# Patient Record
Sex: MALE
[De-identification: names, ages, dates, MRNs, and addresses within clinical notes are randomized per-mention and may not be internally consistent; named-entity substitution may affect disease eponyms.]

---

## 2019-06-01 ENCOUNTER — TRANSCRIPTION ENCOUNTER (OUTPATIENT)
Age: 39
End: 2019-06-01

## 2021-01-04 PROBLEM — Z00.00 ENCOUNTER FOR PREVENTIVE HEALTH EXAMINATION: Status: ACTIVE | Noted: 2021-01-04

## 2021-01-08 ENCOUNTER — APPOINTMENT (OUTPATIENT)
Dept: DERMATOLOGY | Facility: CLINIC | Age: 41
End: 2021-01-08
Payer: COMMERCIAL

## 2021-01-08 DIAGNOSIS — Z86.39 PERSONAL HISTORY OF OTHER ENDOCRINE, NUTRITIONAL AND METABOLIC DISEASE: ICD-10-CM

## 2021-01-08 DIAGNOSIS — L98.9 DISORDER OF THE SKIN AND SUBCUTANEOUS TISSUE, UNSPECIFIED: ICD-10-CM

## 2021-01-08 DIAGNOSIS — L30.9 DERMATITIS, UNSPECIFIED: ICD-10-CM

## 2021-01-08 PROCEDURE — 99072 ADDL SUPL MATRL&STAF TM PHE: CPT

## 2021-01-08 PROCEDURE — 99204 OFFICE O/P NEW MOD 45 MIN: CPT

## 2021-01-08 RX ORDER — AMMONIUM LACTATE 12 %
12 CREAM (GRAM) TOPICAL
Qty: 1 | Refills: 11 | Status: ACTIVE | COMMUNITY
Start: 2021-01-08 | End: 1900-01-01

## 2021-01-08 RX ORDER — BETAMETHASONE DIPROPIONATE 0.5 MG/G
0.05 OINTMENT, AUGMENTED TOPICAL
Qty: 1 | Refills: 1 | Status: ACTIVE | COMMUNITY
Start: 2021-01-08 | End: 1900-01-01

## 2021-01-08 RX ORDER — INSULIN LISPRO 100 [IU]/ML
100 INJECTION, SOLUTION INTRAVENOUS; SUBCUTANEOUS
Refills: 0 | Status: ACTIVE | COMMUNITY

## 2021-01-08 RX ORDER — MUPIROCIN 20 MG/G
2 OINTMENT TOPICAL
Qty: 1 | Refills: 0 | Status: ACTIVE | COMMUNITY
Start: 2021-01-08 | End: 1900-01-01

## 2021-01-08 RX ORDER — INSULIN GLARGINE 100 [IU]/ML
100 INJECTION, SOLUTION SUBCUTANEOUS
Refills: 0 | Status: ACTIVE | COMMUNITY

## 2021-03-11 ENCOUNTER — APPOINTMENT (OUTPATIENT)
Dept: DERMATOLOGY | Facility: CLINIC | Age: 41
End: 2021-03-11
Payer: COMMERCIAL

## 2021-03-11 DIAGNOSIS — L30.9 DERMATITIS, UNSPECIFIED: ICD-10-CM

## 2021-03-11 DIAGNOSIS — L28.0 LICHEN SIMPLEX CHRONICUS: ICD-10-CM

## 2021-03-11 DIAGNOSIS — L85.3 XEROSIS CUTIS: ICD-10-CM

## 2021-03-11 DIAGNOSIS — L28.1 PRURIGO NODULARIS: ICD-10-CM

## 2021-03-11 PROCEDURE — 99072 ADDL SUPL MATRL&STAF TM PHE: CPT

## 2021-03-11 PROCEDURE — 17110 DESTRUCTION B9 LES UP TO 14: CPT

## 2021-03-11 PROCEDURE — 99214 OFFICE O/P EST MOD 30 MIN: CPT | Mod: 25

## 2023-09-19 ENCOUNTER — NON-APPOINTMENT (OUTPATIENT)
Age: 43
End: 2023-09-19

## 2023-12-05 ENCOUNTER — NON-APPOINTMENT (OUTPATIENT)
Age: 43
End: 2023-12-05

## 2024-02-27 ENCOUNTER — NON-APPOINTMENT (OUTPATIENT)
Age: 44
End: 2024-02-27

## 2024-12-16 ENCOUNTER — INPATIENT (INPATIENT)
Facility: HOSPITAL | Age: 44
LOS: 0 days | Discharge: ANOTHER HOSPITAL | DRG: 282 | End: 2024-12-17
Attending: HOSPITALIST | Admitting: INTERNAL MEDICINE
Payer: COMMERCIAL

## 2024-12-16 ENCOUNTER — RESULT REVIEW (OUTPATIENT)
Age: 44
End: 2024-12-16

## 2024-12-16 VITALS
HEART RATE: 80 BPM | TEMPERATURE: 98 F | SYSTOLIC BLOOD PRESSURE: 160 MMHG | RESPIRATION RATE: 18 BRPM | OXYGEN SATURATION: 100 % | DIASTOLIC BLOOD PRESSURE: 97 MMHG

## 2024-12-16 DIAGNOSIS — Z96.41 PRESENCE OF INSULIN PUMP (EXTERNAL) (INTERNAL): Chronic | ICD-10-CM

## 2024-12-16 DIAGNOSIS — I21.4 NON-ST ELEVATION (NSTEMI) MYOCARDIAL INFARCTION: ICD-10-CM

## 2024-12-16 DIAGNOSIS — I10 ESSENTIAL (PRIMARY) HYPERTENSION: ICD-10-CM

## 2024-12-16 DIAGNOSIS — R07.9 CHEST PAIN, UNSPECIFIED: ICD-10-CM

## 2024-12-16 DIAGNOSIS — E10.9 TYPE 1 DIABETES MELLITUS WITHOUT COMPLICATIONS: ICD-10-CM

## 2024-12-16 LAB
A1C WITH ESTIMATED AVERAGE GLUCOSE RESULT: 6.8 % — HIGH (ref 4–5.6)
ADD ON TEST-SPECIMEN IN LAB: SIGNIFICANT CHANGE UP
ALBUMIN SERPL ELPH-MCNC: 3.7 G/DL — SIGNIFICANT CHANGE UP (ref 3.3–5)
ALP SERPL-CCNC: 48 U/L — SIGNIFICANT CHANGE UP (ref 40–120)
ALT FLD-CCNC: 23 U/L — SIGNIFICANT CHANGE UP (ref 12–78)
ANION GAP SERPL CALC-SCNC: 1 MMOL/L — LOW (ref 5–17)
APTT BLD: 30.1 SEC — SIGNIFICANT CHANGE UP (ref 24.5–35.6)
APTT BLD: 30.5 SEC — SIGNIFICANT CHANGE UP (ref 24.5–35.6)
APTT BLD: 49.9 SEC — HIGH (ref 24.5–35.6)
APTT BLD: 77.1 SEC — HIGH (ref 24.5–35.6)
AST SERPL-CCNC: 20 U/L — SIGNIFICANT CHANGE UP (ref 15–37)
BASOPHILS # BLD AUTO: 0.03 K/UL — SIGNIFICANT CHANGE UP (ref 0–0.2)
BASOPHILS NFR BLD AUTO: 0.3 % — SIGNIFICANT CHANGE UP (ref 0–2)
BILIRUB SERPL-MCNC: 0.4 MG/DL — SIGNIFICANT CHANGE UP (ref 0.2–1.2)
BUN SERPL-MCNC: 21 MG/DL — SIGNIFICANT CHANGE UP (ref 7–23)
CALCIUM SERPL-MCNC: 9.2 MG/DL — SIGNIFICANT CHANGE UP (ref 8.5–10.1)
CHLORIDE SERPL-SCNC: 106 MMOL/L — SIGNIFICANT CHANGE UP (ref 96–108)
CHOLEST SERPL-MCNC: 210 MG/DL — HIGH
CO2 SERPL-SCNC: 30 MMOL/L — SIGNIFICANT CHANGE UP (ref 22–31)
CREAT SERPL-MCNC: 1.09 MG/DL — SIGNIFICANT CHANGE UP (ref 0.5–1.3)
D DIMER BLD IA.RAPID-MCNC: <150 NG/ML DDU — SIGNIFICANT CHANGE UP
EGFR: 85 ML/MIN/1.73M2 — SIGNIFICANT CHANGE UP
EOSINOPHIL # BLD AUTO: 0.06 K/UL — SIGNIFICANT CHANGE UP (ref 0–0.5)
EOSINOPHIL NFR BLD AUTO: 0.7 % — SIGNIFICANT CHANGE UP (ref 0–6)
ESTIMATED AVERAGE GLUCOSE: 148 MG/DL — HIGH (ref 68–114)
GLUCOSE BLDC GLUCOMTR-MCNC: 105 MG/DL — HIGH (ref 70–99)
GLUCOSE BLDC GLUCOMTR-MCNC: 111 MG/DL — HIGH (ref 70–99)
GLUCOSE BLDC GLUCOMTR-MCNC: 126 MG/DL — HIGH (ref 70–99)
GLUCOSE BLDC GLUCOMTR-MCNC: 128 MG/DL — HIGH (ref 70–99)
GLUCOSE SERPL-MCNC: 195 MG/DL — HIGH (ref 70–99)
HCT VFR BLD CALC: 45 % — SIGNIFICANT CHANGE UP (ref 39–50)
HCT VFR BLD CALC: 46.1 % — SIGNIFICANT CHANGE UP (ref 39–50)
HDLC SERPL-MCNC: 44 MG/DL — SIGNIFICANT CHANGE UP
HGB BLD-MCNC: 15.1 G/DL — SIGNIFICANT CHANGE UP (ref 13–17)
HGB BLD-MCNC: 15.4 G/DL — SIGNIFICANT CHANGE UP (ref 13–17)
IMM GRANULOCYTES NFR BLD AUTO: 0.3 % — SIGNIFICANT CHANGE UP (ref 0–0.9)
INR BLD: 0.9 RATIO — SIGNIFICANT CHANGE UP (ref 0.85–1.16)
INR BLD: 0.92 RATIO — SIGNIFICANT CHANGE UP (ref 0.85–1.16)
LIPID PNL WITH DIRECT LDL SERPL: 122 MG/DL — HIGH
LYMPHOCYTES # BLD AUTO: 2.05 K/UL — SIGNIFICANT CHANGE UP (ref 1–3.3)
LYMPHOCYTES # BLD AUTO: 22.9 % — SIGNIFICANT CHANGE UP (ref 13–44)
MCHC RBC-ENTMCNC: 31.7 PG — SIGNIFICANT CHANGE UP (ref 27–34)
MCHC RBC-ENTMCNC: 31.8 PG — SIGNIFICANT CHANGE UP (ref 27–34)
MCHC RBC-ENTMCNC: 33.4 G/DL — SIGNIFICANT CHANGE UP (ref 32–36)
MCHC RBC-ENTMCNC: 33.6 G/DL — SIGNIFICANT CHANGE UP (ref 32–36)
MCV RBC AUTO: 94.7 FL — SIGNIFICANT CHANGE UP (ref 80–100)
MCV RBC AUTO: 94.9 FL — SIGNIFICANT CHANGE UP (ref 80–100)
MONOCYTES # BLD AUTO: 0.61 K/UL — SIGNIFICANT CHANGE UP (ref 0–0.9)
MONOCYTES NFR BLD AUTO: 6.8 % — SIGNIFICANT CHANGE UP (ref 2–14)
NEUTROPHILS # BLD AUTO: 6.17 K/UL — SIGNIFICANT CHANGE UP (ref 1.8–7.4)
NEUTROPHILS NFR BLD AUTO: 69 % — SIGNIFICANT CHANGE UP (ref 43–77)
NON HDL CHOLESTEROL: 166 MG/DL — HIGH
PLATELET # BLD AUTO: 222 K/UL — SIGNIFICANT CHANGE UP (ref 150–400)
PLATELET # BLD AUTO: 235 K/UL — SIGNIFICANT CHANGE UP (ref 150–400)
POTASSIUM SERPL-MCNC: 3.9 MMOL/L — SIGNIFICANT CHANGE UP (ref 3.5–5.3)
POTASSIUM SERPL-SCNC: 3.9 MMOL/L — SIGNIFICANT CHANGE UP (ref 3.5–5.3)
PROT SERPL-MCNC: 6.6 GM/DL — SIGNIFICANT CHANGE UP (ref 6–8.3)
PROTHROM AB SERPL-ACNC: 10.6 SEC — SIGNIFICANT CHANGE UP (ref 9.9–13.4)
PROTHROM AB SERPL-ACNC: 10.9 SEC — SIGNIFICANT CHANGE UP (ref 9.9–13.4)
RBC # BLD: 4.75 M/UL — SIGNIFICANT CHANGE UP (ref 4.2–5.8)
RBC # BLD: 4.86 M/UL — SIGNIFICANT CHANGE UP (ref 4.2–5.8)
RBC # FLD: 12 % — SIGNIFICANT CHANGE UP (ref 10.3–14.5)
RBC # FLD: 12.1 % — SIGNIFICANT CHANGE UP (ref 10.3–14.5)
SODIUM SERPL-SCNC: 137 MMOL/L — SIGNIFICANT CHANGE UP (ref 135–145)
TRIGL SERPL-MCNC: 254 MG/DL — HIGH
TROPONIN I, HIGH SENSITIVITY RESULT: 164.74 NG/L — HIGH
TROPONIN I, HIGH SENSITIVITY RESULT: 184.77 NG/L — HIGH
TROPONIN I, HIGH SENSITIVITY RESULT: 186.06 NG/L — HIGH
TROPONIN I, HIGH SENSITIVITY RESULT: 99.14 NG/L — HIGH
WBC # BLD: 7.28 K/UL — SIGNIFICANT CHANGE UP (ref 3.8–10.5)
WBC # BLD: 8.95 K/UL — SIGNIFICANT CHANGE UP (ref 3.8–10.5)
WBC # FLD AUTO: 7.28 K/UL — SIGNIFICANT CHANGE UP (ref 3.8–10.5)
WBC # FLD AUTO: 8.95 K/UL — SIGNIFICANT CHANGE UP (ref 3.8–10.5)

## 2024-12-16 PROCEDURE — 99222 1ST HOSP IP/OBS MODERATE 55: CPT

## 2024-12-16 PROCEDURE — 99223 1ST HOSP IP/OBS HIGH 75: CPT

## 2024-12-16 PROCEDURE — 93306 TTE W/DOPPLER COMPLETE: CPT | Mod: 26

## 2024-12-16 PROCEDURE — 85027 COMPLETE CBC AUTOMATED: CPT

## 2024-12-16 PROCEDURE — C1887: CPT

## 2024-12-16 PROCEDURE — 85730 THROMBOPLASTIN TIME PARTIAL: CPT

## 2024-12-16 PROCEDURE — C1894: CPT

## 2024-12-16 PROCEDURE — 84484 ASSAY OF TROPONIN QUANT: CPT

## 2024-12-16 PROCEDURE — 99285 EMERGENCY DEPT VISIT HI MDM: CPT

## 2024-12-16 PROCEDURE — 71045 X-RAY EXAM CHEST 1 VIEW: CPT | Mod: 26

## 2024-12-16 PROCEDURE — 36415 COLL VENOUS BLD VENIPUNCTURE: CPT

## 2024-12-16 PROCEDURE — 83036 HEMOGLOBIN GLYCOSYLATED A1C: CPT

## 2024-12-16 PROCEDURE — 93010 ELECTROCARDIOGRAM REPORT: CPT

## 2024-12-16 PROCEDURE — 85610 PROTHROMBIN TIME: CPT

## 2024-12-16 PROCEDURE — 80061 LIPID PANEL: CPT

## 2024-12-16 PROCEDURE — 82962 GLUCOSE BLOOD TEST: CPT

## 2024-12-16 PROCEDURE — 93458 L HRT ARTERY/VENTRICLE ANGIO: CPT

## 2024-12-16 PROCEDURE — C1769: CPT

## 2024-12-16 PROCEDURE — 80048 BASIC METABOLIC PNL TOTAL CA: CPT

## 2024-12-16 RX ORDER — 0.9 % SODIUM CHLORIDE 0.9 %
1000 INTRAVENOUS SOLUTION INTRAVENOUS
Refills: 0 | Status: DISCONTINUED | OUTPATIENT
Start: 2024-12-16 | End: 2024-12-17

## 2024-12-16 RX ORDER — NITROGLYCERIN 0.4MG/HR
0.4 PATCH, TRANSDERMAL 24 HOURS TRANSDERMAL
Refills: 0 | Status: DISCONTINUED | OUTPATIENT
Start: 2024-12-16 | End: 2024-12-17

## 2024-12-16 RX ORDER — ACETAMINOPHEN 500MG 500 MG/1
650 TABLET, COATED ORAL EVERY 6 HOURS
Refills: 0 | Status: DISCONTINUED | OUTPATIENT
Start: 2024-12-16 | End: 2024-12-17

## 2024-12-16 RX ORDER — GLUCAGON INJECTION, SOLUTION 0.5 MG/.1ML
1 INJECTION, SOLUTION SUBCUTANEOUS ONCE
Refills: 0 | Status: DISCONTINUED | OUTPATIENT
Start: 2024-12-16 | End: 2024-12-17

## 2024-12-16 RX ORDER — HEPARIN SODIUM,PORCINE 1000/ML
VIAL (ML) INJECTION
Qty: 25000 | Refills: 0 | Status: DISCONTINUED | OUTPATIENT
Start: 2024-12-16 | End: 2024-12-17

## 2024-12-16 RX ORDER — INSULIN REG, HUM S-S BUFF 100/ML
0 VIAL (ML) INJECTION
Refills: 0 | DISCHARGE

## 2024-12-16 RX ORDER — ACETAMINOPHEN, DIPHENHYDRAMINE HCL, PHENYLEPHRINE HCL 325; 25; 5 MG/1; MG/1; MG/1
3 TABLET ORAL AT BEDTIME
Refills: 0 | Status: DISCONTINUED | OUTPATIENT
Start: 2024-12-16 | End: 2024-12-17

## 2024-12-16 RX ORDER — INFLUENZA VIRUS VACCINE 15; 15; 15; 15 UG/.5ML; UG/.5ML; UG/.5ML; UG/.5ML
0.5 SUSPENSION INTRAMUSCULAR ONCE
Refills: 0 | Status: DISCONTINUED | OUTPATIENT
Start: 2024-12-16 | End: 2024-12-17

## 2024-12-16 RX ORDER — MAGNESIUM, ALUMINUM HYDROXIDE 200-225/5
30 SUSPENSION, ORAL (FINAL DOSE FORM) ORAL EVERY 4 HOURS
Refills: 0 | Status: DISCONTINUED | OUTPATIENT
Start: 2024-12-16 | End: 2024-12-17

## 2024-12-16 RX ORDER — NITROGLYCERIN 0.4MG/HR
0.5 PATCH, TRANSDERMAL 24 HOURS TRANSDERMAL ONCE
Refills: 0 | Status: COMPLETED | OUTPATIENT
Start: 2024-12-16 | End: 2024-12-16

## 2024-12-16 RX ORDER — HEPARIN SODIUM,PORCINE 1000/ML
4100 VIAL (ML) INJECTION EVERY 6 HOURS
Refills: 0 | Status: DISCONTINUED | OUTPATIENT
Start: 2024-12-16 | End: 2024-12-17

## 2024-12-16 RX ORDER — ONDANSETRON HYDROCHLORIDE 4 MG/1
4 TABLET, FILM COATED ORAL EVERY 6 HOURS
Refills: 0 | Status: DISCONTINUED | OUTPATIENT
Start: 2024-12-16 | End: 2024-12-17

## 2024-12-16 RX ORDER — CYCLOBENZAPRINE HCL 10 MG
10 TABLET ORAL ONCE
Refills: 0 | Status: COMPLETED | OUTPATIENT
Start: 2024-12-16 | End: 2024-12-16

## 2024-12-16 RX ORDER — HEPARIN SODIUM,PORCINE 1000/ML
4100 VIAL (ML) INJECTION ONCE
Refills: 0 | Status: COMPLETED | OUTPATIENT
Start: 2024-12-16 | End: 2024-12-16

## 2024-12-16 RX ADMIN — Medication 10 MILLIGRAM(S): at 01:01

## 2024-12-16 RX ADMIN — Medication 900 UNIT(S)/HR: at 19:16

## 2024-12-16 RX ADMIN — Medication 800 UNIT(S)/HR: at 07:17

## 2024-12-16 RX ADMIN — Medication 750 UNIT(S)/HR: at 11:48

## 2024-12-16 RX ADMIN — Medication 4100 UNIT(S): at 04:55

## 2024-12-16 RX ADMIN — Medication 900 UNIT(S)/HR: at 19:14

## 2024-12-16 RX ADMIN — Medication 0.5 INCH(S): at 02:42

## 2024-12-16 RX ADMIN — Medication 81 MILLIGRAM(S): at 10:03

## 2024-12-16 RX ADMIN — Medication 800 UNIT(S)/HR: at 04:56

## 2024-12-16 NOTE — PATIENT PROFILE ADULT - FALL HARM RISK - HARM RISK INTERVENTIONS

## 2024-12-16 NOTE — ED ADULT NURSE REASSESSMENT NOTE - NS ED NURSE REASSESS COMMENT FT1
. Pt resting comfortably in bed, denies any pain/ symptoms at this time, vital signs stable, awaiting SW consult.

## 2024-12-16 NOTE — ED PROVIDER NOTE - CLINICAL SUMMARY MEDICAL DECISION MAKING FREE TEXT BOX
45 yo M with chest pain; hx of DM type 1; heart score is 3.  Will need admission to tele with cardiology consult.  will trend troponins, control BP with nitroglycerin paste and monitor.

## 2024-12-16 NOTE — ED ADULT TRIAGE NOTE - CHIEF COMPLAINT QUOTE
Pt BIBEMS c/o chest pain. Pt reports 3 episodes of L sided CP today radiating to left arm, resolving spontaneously. Pt denies cardiac hx, recent travel, SOB at this time. EKG done in triage, Hx DM1.

## 2024-12-16 NOTE — H&P ADULT - NSICDXFAMILYHX_GEN_ALL_CORE_FT
FAMILY HISTORY:  Father  Still living? Unknown  FH: type 2 diabetes, Age at diagnosis: Age Unknown  FHx: heart disease, Age at diagnosis: Age Unknown    Mother  Still living? Unknown  FHx: heart disease, Age at diagnosis: Age Unknown

## 2024-12-16 NOTE — CONSULT NOTE ADULT - NS ATTEND AMEND GEN_ALL_CORE FT
Patient seen and examined bedside.   Patient presented with small troponin elevation but typical recurrent CP at rest concerning for unstable angina/NSTEMI  I discussed the risks and benefits of LHC and he wished to proceed.     LHC showed severe LAD/diagonal and RCA disease.  Will transfer for CABG evaluation
agree w/ above. Longstanding DM type 1 typical chest pain plan for cath.

## 2024-12-16 NOTE — ED PROVIDER NOTE - CARDIAC, MLM
Normal rate, regular rhythm.  Heart sounds S1, S2.  No murmurs, rubs or gallops. CHEST WALL: +reproducible left pectoralis muscle tenderness and left anterior shoulder tenderness Frank GARZA)

## 2024-12-16 NOTE — CONSULT NOTE ADULT - SUBJECTIVE AND OBJECTIVE BOX
Department of Cardiology                                                               Division of Interventional Cardiology                                                               [facility] /[address] [address]                                                                                 [phone]           Interventional Cardiology Consult     Patient is a 44y old  Male who presents with a chief complaint of NSTEMI with elevated troponin. (16 Dec 2024 10:42)    Interventional cardiology consulted for [facility]. NSTEMI troponin down trending today, peaked at 186. 164 today.        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 Sig labs: CBC and CMP wnl except .  Troponin trending up: 99.14->184.77  D-dimer normal <150.  EK bpm in NSR and no acute ST-T changes.  CXR negative for any acute cardiopulmonary abnormalities.     (16 Dec 2024 03:17)    currently patient is CP free  pt and family aware of procedure all risks and benefits discusses. patient is aware he will have to turn off and remove insulin pump and continous glucose monitor during [facility] procedure         PREVIOUS DIAGNOSTIC TESTING  ECHO FINDINGS: report pending     STRESS FINDINGS: none  CATHETERIZATION: FINDINGS: none   EKG EK bpm in NSR and no acute ST-T changes.    Vital Signs  Vital Signs Last 24 Hrs  T(C): 36.8 (16 Dec 2024 08:35), Max: 36.8 (16 Dec 2024 08:35)  T(F): 98.2 (16 Dec 2024 08:35), Max: 98.2 (16 Dec 2024 08:35)  HR: 80 (16 Dec 2024 08:35) (77 - 81)  BP: 111/73 (16 Dec 2024 08:35) (111/73 - 160/97)  BP(mean): 102 (16 Dec 2024 03:28) (102 - 102)  RR: 18 (16 Dec 2024 08:35) (18 - 18)  SpO2: 98% (16 Dec 2024 08:35) (97% - 100%)    Parameters below as of 16 Dec 2024 04:30  Patient On (Oxygen Delivery Method): room air          Labs:                        15.4   7.28  )-----------( 222      ( 16 Dec 2024 09:33 )             46.1     12-16    137  |  106  |  21  ----------------------------<  195[H]  3.9   |  30  |  1.09    Ca    9.2      16 Dec 2024 00:29    TPro  6.6  /  Alb  3.7  /  TBili  0.4  /  DBili  x   /  AST  20  /  ALT  23  /  AlkPhos  48  12-    PT/INR - ( 16 Dec 2024 04:13 )   PT: 10.9 sec;   INR: 0.92 ratio         PTT - ( 16 Dec 2024 09:33 )  PTT:77.1 sec    - TroponinI hsT: <-164.74, <-186.06, <-184.77, <-99.14        MEDICATIONS  (STANDING):  aspirin  chewable 81 milliGRAM(s) Oral daily  atorvastatin 40 milliGRAM(s) Oral at bedtime  dextrose 5%. 1000 milliLiter(s) (100 mL/Hr) IV Continuous <Continuous>  dextrose 5%. 1000 milliLiter(s) (50 mL/Hr) IV Continuous <Continuous>  dextrose 50% Injectable 25 Gram(s) IV Push once  dextrose 50% Injectable 12.5 Gram(s) IV Push once  dextrose 50% Injectable 25 Gram(s) IV Push once  glucagon  Injectable 1 milliGRAM(s) IntraMuscular once  heparin  Infusion.  Unit(s)/Hr (8 mL/Hr) IV Continuous <Continuous>  influenza   Vaccine 0.5 milliLiter(s) IntraMuscular once  insulin lispro (HumaLOG) Pump 1 Each SubCutaneous Continuous Pump    MEDICATIONS  (PRN):  acetaminophen     Tablet .. 650 milliGRAM(s) Oral every 6 hours PRN Mild Pain (1 - 3)  aluminum hydroxide/magnesium hydroxide/simethicone Suspension 30 milliLiter(s) Oral every 4 hours PRN Dyspepsia  dextrose Oral Gel 15 Gram(s) Oral once PRN Blood Glucose LESS THAN 70 milliGRAM(s)/deciliter  heparin   Injectable 4100 Unit(s) IV Push every 6 hours PRN For aPTT less than 40  melatonin 3 milliGRAM(s) Oral at bedtime PRN Insomnia  nitroglycerin     SubLingual 0.4 milliGRAM(s) SubLingual every 5 minutes PRN Chest Pain  ondansetron Injectable 4 milliGRAM(s) IV Push every 6 hours PRN Nausea and/or Vomiting      PHYSICAL EXAM  GENERAL: NAD, AAOx3  CHEST/LUNG: Clear to auscultation bilaterally; No wheeze  HEART: s1 s2 Regular rate and rhythm; No murmurs, rubs, or gallops  ABDOMEN: Soft, Nontender, Nondistended; Bowel sounds present X 4 quadrants   EXTREMITIES:  2+ Peripheral Pulses, No clubbing, cyanosis, or edema  Psych: normal affect and behavior, calm and cooperative                   
  CHIEF COMPLAINT: Patient is a 44y old  Male who presents with a chief complaint of NSTEMI with elevated troponin. (16 Dec 2024 10:30)      HPI:  Chief Complaint: chest pain.    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 He was feeling more like chest pressure and pinching, and radiating to his left arm, lasted for about 30 seconds.  They, he felt similar pain at about 12.20 pm while he was sitting in a couch and was trying to turn on the TV. At the same time, he also felt left arm heaviness and he intentionally dropped the TV remote by himself.  He denies any weakness in his left hand at that time.  He also denies any stroke like symptoms.  I also asked him about prior information that he said that his left arm was weak and TV remote was dropped, he said that that information is not true.  He also denies headache, blurry vision or diplopia.  Then, he has another episode of chest pain at about 7.00 PM, and again lasted for about 30-45 seconds, but had some sob at this time. He did not take any medications at home. Then, her had 4th episode of left sided chest pain at about 9.00-9.30 pm, then his wife suggested to chanelle 911, and he was BIBA to ED. He has received ASA in ambulance. At this time, he is chest pain free, and feeling better, but feels anxious and nervous. He says that he works as a , and has mostly sedentary life style. Patient says that he never has chest pain like this. Otherwise, he denies SOB, palpitation, N/V, abd pain, diarrhea or dysuria.  He also denies smoking, alcohol  or substance abuse. He says that his BS usually runs about 140, and his last A1c level was 6.4 about 2 months ago,   Sig labs: CBC and CMP wnl except .  Troponin trending up: 99.14->184.77  D-dimer normal <150.  EK bpm in NSR and no acute ST-T changes.  CXR negative for any acute cardiopulmonary abnormalities.    Flexeril and SL nitro given in ED, and Cardiology consult has been placed by ED.    I requested ED provider to start heparin drip with bolus.     (16 Dec 2024 03:17)    Cardiology consulted for chest pain and elevated trops. Pt. with risk factors - T1DM, positive cardiac Fx, never seen a cardiologist.     PAST MEDICAL & SURGICAL HISTORY:  DM (diabetes mellitus)  Diabetes mellitus type 1  Insulin pump in place  Insulin pump in place    SOCIAL HISTORY:   Alcohol: Denied  Smoking: Nonsmoker  Drug Use: Denied  Marital Status:     FAMILY HISTORY: FAMILY HISTORY:  FHx: heart disease (Father, Mother)  FH: type 2 diabetes (Father)    MEDICATIONS  (STANDING):  aspirin  chewable 81 milliGRAM(s) Oral daily  atorvastatin 40 milliGRAM(s) Oral at bedtime  dextrose 5%. 1000 milliLiter(s) (100 mL/Hr) IV Continuous <Continuous>  dextrose 5%. 1000 milliLiter(s) (50 mL/Hr) IV Continuous <Continuous>  dextrose 50% Injectable 25 Gram(s) IV Push once  dextrose 50% Injectable 12.5 Gram(s) IV Push once  dextrose 50% Injectable 25 Gram(s) IV Push once  glucagon  Injectable 1 milliGRAM(s) IntraMuscular once  heparin  Infusion.  Unit(s)/Hr (8 mL/Hr) IV Continuous <Continuous>  influenza   Vaccine 0.5 milliLiter(s) IntraMuscular once  insulin lispro (ADMELOG) corrective regimen sliding scale   SubCutaneous three times a day before meals  insulin lispro (ADMELOG) corrective regimen sliding scale   SubCutaneous at bedtime    MEDICATIONS  (PRN):  acetaminophen     Tablet .. 650 milliGRAM(s) Oral every 6 hours PRN Mild Pain (1 - 3)  aluminum hydroxide/magnesium hydroxide/simethicone Suspension 30 milliLiter(s) Oral every 4 hours PRN Dyspepsia  dextrose Oral Gel 15 Gram(s) Oral once PRN Blood Glucose LESS THAN 70 milliGRAM(s)/deciliter  heparin   Injectable 4100 Unit(s) IV Push every 6 hours PRN For aPTT less than 40  melatonin 3 milliGRAM(s) Oral at bedtime PRN Insomnia  nitroglycerin     SubLingual 0.4 milliGRAM(s) SubLingual every 5 minutes PRN Chest Pain  ondansetron Injectable 4 milliGRAM(s) IV Push every 6 hours PRN Nausea and/or Vomiting      Allergies - No Known Allergies      REVIEW OF SYSTEMS:  CONSTITUTIONAL: No weakness, fevers or chills  Eyes: No visual changes  NECK: No pain or stiffness  RESPIRATORY: No cough, wheezing, hemoptysis; No shortness of breath  CARDIOVASCULAR: No chest pain or palpitations  GASTROINTESTINAL: No abdominal pain. No nausea, vomiting, or hematemesis; No diarrhea or constipation. No melena or hematochezia.  GENITOURINARY: No dysuria, frequency or hematuria  NEUROLOGICAL: No numbness.  SKIN: No itching or rash  All other review of systems is negative unless indicated above    VITAL SIGNS:   Vital Signs Last 24 Hrs  T(C): 36.8 (16 Dec 2024 08:35), Max: 36.8 (16 Dec 2024 08:35)  T(F): 98.2 (16 Dec 2024 08:35), Max: 98.2 (16 Dec 2024 08:35)  HR: 80 (16 Dec 2024 08:35) (77 - 81)  BP: 111/73 (16 Dec 2024 08:35) (111/73 - 160/97)  BP(mean): 102 (16 Dec 2024 03:28) (102 - 102)  RR: 18 (16 Dec 2024 08:35) (18 - 18)  SpO2: 98% (16 Dec 2024 08:35) (97% - 100%)    Parameters below as of 16 Dec 2024 04:30  Patient On (Oxygen Delivery Method): room air        I&O's Summary      PHYSICAL EXAM:  Constitutional: NAD, awake and alert  HEENT:  EOMI,  Pupils round, No oral cyanosis.  Pulmonary: Non-labored, breath sounds are clear bilaterally, No wheezing, rales or rhonchi  Cardiovascular: S1 and S2, regular rate and rhythm, no Murmurs, gallops or rubs  Gastrointestinal: Bowel Sounds present, soft, nontender.   Lymph: No peripheral edema. No cervical lymphadenopathy.  Neurological: Alert, no focal deficits  Skin: No rashes.  Psych:  Mood & affect appropriate    LABS: reviewed                         15.1   8.95  )-----------( 235      ( 16 Dec 2024 00:29 )             45.0     16 Dec 2024 00:29    137    |  106    |  21     ----------------------------<  195    3.9     |  30     |  1.09     Ca    9.2        16 Dec 2024 00:29    TPro  6.6    /  Alb  3.7    /  TBili  0.4    /  DBili  x      /  AST  20     /  ALT  23     /  AlkPhos  48     16 Dec 2024 00:29    PT/INR - ( 16 Dec 2024 04:13 )   PT: 10.9 sec;   INR: 0.92 ratio         PTT - ( 16 Dec 2024 04:13 )  PTT:30.5 sec    RadiologyEKG/TTE: reviewed     TTE - pending

## 2024-12-16 NOTE — ED PROVIDER NOTE - OBJECTIVE STATEMENT
Pt. is a 45 yo M with hx of type 1 DM on insulin pump (diagnosed age 20) , hx of left frozen shoulder in the past, presents with left sided upper chest pain described as heavy pressure.  Pain lasts less than a minute and he had 4 separate episodes of the pain today.  Pain radiates down left arm with some tingling.  Patient first noticed the pain after lifting heavy laundry basket.  He then tried to lift arm to use remote control on couch and noticed left arm dropped.  Patient denies any muscle weakness or numbness in arms or legs.  Denies dizziness, neck pain, abdominal pain, sweats.  Last episode was today while lying in bed at rest.  Pain began and patient had to sit up and lean forward due to slight SOB feeling. Leaning forward helped symptoms resolve after 30 seconds to 1 minute.  Wife asked patient to call 911 which he at first refused but then called.  States in ambulance he had aspirin.  Has been asymptomatic since.

## 2024-12-16 NOTE — H&P ADULT - ASSESSMENT
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 He was feeling more like chest pressure and pinching, and radiating to his left arm, lasted for about 30 seconds.  They, he felt similar pain at about 12.20 pm while he was sitting in a couch and was trying to turn on the TV. At the same time, he also felt left arm heaviness and he intentionally dropped the TV remote by himself.  He denies any weakness in his left hand at that time.  He also denies any stroke like symptoms.  I also asked him about prior information that he said that his left arm was weak and TV remote was dropped, he said that that information is not true.  He also denies headache, blurry vision or diplopia.  Then, he has another episode of chest pain at about 7.00 PM, and again lasted for about 30-45 seconds, but had some sob at this time. He did not take any medications at home. Then, her had 4th episode of left sided chest pain at about 9.00-9.30 pm, then his wife suggested to chanelle 911, and he was BIBA to ED. He has received ASA in ambulance. At this time, he is chest pain free, and feeling better, but feels anxious and nervous. He says that he works as a , and has mostly sedentary life style. Otherwise, he denies SOB, palpitation, N/V, abd pain, diarrhea or dysuria.  He also denies smoking, alcohol  or substance abuse. He says that his BS usually runs about 140, and his last A1c level was 6.4 about 2 months ago,  Sig labs: CBC and CMP wnl except  and troponin 99.14.  EK bpm in NSR and no acute ST-T changes.  CXR negative for any acute cardiopulmonary abnormalities.    Flexeril and SL nitro given in ED, and Cardiology consult has been placed by ED.    # Precordial chest pain with mildly elevated troponin.  -Troponin 99.14, EKG and CXR   unremarkable.  -Admit to telemetry.  -Serial troponin.  Lipid panel.  -2D Echo.  -SL nitro prn.  -ASA and Statin started.  -Follow cardiology consult in am.    # Uncontrolled DM-1 with hyperglycemia.  -.  -Will check A1c level.  -ISS with coverage.  -Has insulin pump in place.    # Accelerated HTN.  -Likely stress related. Now controlled.  -Initial BP in ED was 160/97, and now 132/88.  -Continue to monitor.    # DVT prophylaxis: Lovenox sq daily.           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     # Precordial chest pain with mildly elevated troponin.  -Troponin 99.14, EKG and CXR   unremarkable.  -Admit to telemetry.  -Serial troponin.  Lipid panel.  -2D Echo.  -SL nitro prn.  -Heparin drip with bolus.  -ASA and Statin started.  -Oxygen via NC  -Follow cardiology consult in am.    # Uncontrolled DM-1 with hyperglycemia.  -.  -Will check A1c level.  -ISS with coverage.  -Has insulin pump in place.    # Accelerated HTN.  -Likely stress related. Now controlled.  -Initial BP in ED was 160/97, and now 132/88.  -Continue to monitor.    # DVT prophylaxis: On heparin drip.    Plan of care d/w the patient in detail at bedside, and he verbalized understanding.           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     # NSTEMI with elevated troponin.  -Troponin trending up 99.14->184.77, EKG and CXR   unremarkable.  -Chest pain free now.  -Admit to telemetry.  -Serial troponin.  Lipid panel.  -2D Echo.  -SL nitro prn.  -Heparin drip with bolus.  -ASA and Statin started.  -Oxygen via NC  -Follow cardiology consult in am.    # Uncontrolled DM-1 with hyperglycemia.  -.  -Will check A1c level.  -ISS with coverage.  -Has insulin pump in place.    # Accelerated HTN.  -Likely stress related. Now controlled.  -Initial BP in ED was 160/97, and now 132/88.  -Continue to monitor.    # DVT prophylaxis: On heparin drip.    Plan of care d/w the patient in detail at bedside, and he verbalized understanding.           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     # NSTEMI with elevated troponin.  -Troponin trending up 99.14->184.77, D-dimer negative.   -EKG and CXR   unremarkable.  -Chest pain free now.  -Admit to telemetry.  -Serial troponin.  Lipid panel.  -2D Echo.  -SL nitro prn.  -Heparin drip with bolus.  -ASA and Statin started.  -Oxygen via NC  -Follow cardiology consult in am.    # Uncontrolled DM-1 with hyperglycemia.  -.  -Will check A1c level.  -ISS with coverage.  -Has insulin pump in place.    # Accelerated HTN.  -Likely stress related. Now controlled.  -Initial BP in ED was 160/97, and now 132/88.  -Continue to monitor.    # DVT prophylaxis: On heparin drip.    Plan of care d/w the patient in detail at bedside, and he verbalized understanding.

## 2024-12-16 NOTE — CONSULT NOTE ADULT - PROBLEM SELECTOR RECOMMENDATION 9
-plan for cardiac cath for ischemic work up tomorrow 12/17  - can have light breakfast in AM then npo x meds   -     ASA class: II  Creatinine: 1.09  GFR: 85  Bleeding  Risk score: 0.9  Sage Score: 4

## 2024-12-16 NOTE — ED ADULT NURSE NOTE - NSFALLUNIVINTERV_ED_ALL_ED
Bed/Stretcher in lowest position, wheels locked, appropriate side rails in place/Call bell, personal items and telephone in reach/Instruct patient to call for assistance before getting out of bed/chair/stretcher/Non-slip footwear applied when patient is off stretcher/[place] to call system/Physically safe environment - no spills, clutter or unnecessary equipment/Purposeful proactive rounding/Room/bathroom lighting operational, light cord in reach

## 2024-12-16 NOTE — ED PROVIDER NOTE - CARE PLAN
1 Principal Discharge DX:	Chest pain  Secondary Diagnosis:	History of diabetes mellitus  Secondary Diagnosis:	Elevated troponin

## 2024-12-16 NOTE — H&P ADULT - NSHPLABSRESULTS_GEN_ALL_CORE
LABS:                        15.1   8.95  )-----------( 235      ( 16 Dec 2024 00:29 )             45.0     12-16    137  |  106  |  21  ----------------------------<  195[H]  3.9   |  30  |  1.09    Ca    9.2      16 Dec 2024 00:29    TPro  6.6  /  Alb  3.7  /  TBili  0.4  /  DBili  x   /  AST  20  /  ALT  23  /  Alk Phos  48  12-16

## 2024-12-16 NOTE — H&P ADULT - HISTORY OF PRESENT ILLNESS
Chief Complaint: chest pain.    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 He was feeling more like chest pressure and pinching, and radiating to his left arm, lasted for about 30 seconds.  They, he felt similar pain at about 12.20 pm while he was sitting in a couch and was trying to turn on the TV. At the same time, he also felt left arm heaviness and he intentionally dropped the TV remote by himself.  He denies any weakness in his left hand at that time.  He also denies any stroke like symptoms.  I also asked him about prior information that he said that his left arm was weak and TV remote was dropped, he said that that information is not true.  He also denies headache, blurry vision or diplopia.  Then, he has another episode of chest pain at about 7.00 PM, and again lasted for about 30-45 seconds, but had some sob at this time. He did not take any medications at home. Then, her had 4th episode of left sided chest pain at about 9.00-9.30 pm, then his wife suggested to chanelle 911, and he was BIBA to ED. He has received ASA in ambulance. At this time, he is chest pain free, and feeling better, but feels anxious and nervous. He says that he works as a , and has mostly sedentary life style. Otherwise, he denies SOB, palpitation, N/V, abd pain, diarrhea or dysuria.  He also denies smoking, alcohol  or substance abuse. He says that his BS usually runs about 140, and his last A1c level was 6.4 about 2 months ago,  Sig labs: CBC and CMP wnl except  and troponin 99.14.  EK bpm in NSR and no acute ST-T changes.  CXR negative for any acute cardiopulmonary abnormalities.    Flexeril and SL nitro given in ED, and Cardiology consult has been placed by ED.       Chief Complaint: chest pain.    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 He was feeling more like chest pressure and pinching, and radiating to his left arm, lasted for about 30 seconds.  They, he felt similar pain at about 12.20 pm while he was sitting in a couch and was trying to turn on the TV. At the same time, he also felt left arm heaviness and he intentionally dropped the TV remote by himself.  He denies any weakness in his left hand at that time.  He also denies any stroke like symptoms.  I also asked him about prior information that he said that his left arm was weak and TV remote was dropped, he said that that information is not true.  He also denies headache, blurry vision or diplopia.  Then, he has another episode of chest pain at about 7.00 PM, and again lasted for about 30-45 seconds, but had some sob at this time. He did not take any medications at home. Then, her had 4th episode of left sided chest pain at about 9.00-9.30 pm, then his wife suggested to chanelle 911, and he was BIBA to ED. He has received ASA in ambulance. At this time, he is chest pain free, and feeling better, but feels anxious and nervous. He says that he works as a , and has mostly sedentary life style. Patient says that he never has chest pain like this. Otherwise, he denies SOB, palpitation, N/V, abd pain, diarrhea or dysuria.  He also denies smoking, alcohol  or substance abuse. He says that his BS usually runs about 140, and his last A1c level was 6.4 about 2 months ago,   Sig labs: CBC and CMP wnl except .  Troponin trending up: 99.14->184.77  EK bpm in NSR and no acute ST-T changes.  CXR negative for any acute cardiopulmonary abnormalities.    Flexeril and SL nitro given in ED, and Cardiology consult has been placed by ED.    I requested ED provider to start heparin drip with bolus.     Chief Complaint: chest pain.    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 He was feeling more like chest pressure and pinching, and radiating to his left arm, lasted for about 30 seconds.  They, he felt similar pain at about 12.20 pm while he was sitting in a couch and was trying to turn on the TV. At the same time, he also felt left arm heaviness and he intentionally dropped the TV remote by himself.  He denies any weakness in his left hand at that time.  He also denies any stroke like symptoms.  I also asked him about prior information that he said that his left arm was weak and TV remote was dropped, he said that that information is not true.  He also denies headache, blurry vision or diplopia.  Then, he has another episode of chest pain at about 7.00 PM, and again lasted for about 30-45 seconds, but had some sob at this time. He did not take any medications at home. Then, her had 4th episode of left sided chest pain at about 9.00-9.30 pm, then his wife suggested to chanelle 911, and he was BIBA to ED. He has received ASA in ambulance. At this time, he is chest pain free, and feeling better, but feels anxious and nervous. He says that he works as a , and has mostly sedentary life style. Patient says that he never has chest pain like this. Otherwise, he denies SOB, palpitation, N/V, abd pain, diarrhea or dysuria.  He also denies smoking, alcohol  or substance abuse. He says that his BS usually runs about 140, and his last A1c level was 6.4 about 2 months ago,   Sig labs: CBC and CMP wnl except .  Troponin trending up: 99.14->184.77  D-dimer normal <150.  EK bpm in NSR and no acute ST-T changes.  CXR negative for any acute cardiopulmonary abnormalities.    Flexeril and SL nitro given in ED, and Cardiology consult has been placed by ED.    I requested ED provider to start heparin drip with bolus.

## 2024-12-16 NOTE — CONSULT NOTE ADULT - PROBLEM SELECTOR RECOMMENDATION 9
ptl; with recurrent chest pain 6/10 at rest lasing for less than 1 min, associated with SOB, mildly elevated trops:  99->184->186, risk factors - T1AM, positive cardiac Fx  Recommendations: trend trops and EKG, tele monitor, TTE, plan for LHC to r/o ACS - pt. will be assessed for IC,   cont/. heparin gtt and asa, check lipid panel ptl; with recurrent chest pain 6/10 at rest lasing for less than 1 min, associated with SOB, mildly elevated trops:  99->184->186, risk factors - T1AM, positive cardiac Fx  Recommendations: trend trops and EKG, tele monitor, TTE, plan for LHC to r/o ACS - pt. will be assessed for cardiac cath   cont/. heparin gtt and asa, check lipid panel

## 2024-12-16 NOTE — PROGRESS NOTE ADULT - ASSESSMENT
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     # NSTEMI with elevated troponin.  - Troponin trending up 99.14->184.77, D-dimer negative.   - EKG and CXR   unremarkable.  - trops mildly elevated but uptrending   - lipid panel elevated, statin started on admission   - f/u TTE   - c/w heparin gtt   - c/w ASA, statin   - cards consult, d/w team, plan for cath today vs tomorrow     # Uncontrolled DM-1 with hyperglycemia.  - A1c pending   - insulin pump in place    # HTN  - normal now 110/70s     # DVT prophylaxis: heparin gtt     #dispo - pending cath , anticipate dc in 24hr

## 2024-12-16 NOTE — ED ADULT NURSE NOTE - OBJECTIVE STATEMENT
Pt presents to ed c/o of x3 episodes of chest pain starting today. Pt states he endorsed chest tightness and tingling down his left arm, that has now been resolved. Pt denies chest pain, palpations, sob, diaphoresis, fever chills, nausea and vomiting at this time. Pt denies recent trauma.  Pt A&Ox3, arrived with a 18g IV from EMS on the left AC.

## 2024-12-17 ENCOUNTER — TRANSCRIPTION ENCOUNTER (OUTPATIENT)
Age: 44
End: 2024-12-17

## 2024-12-17 ENCOUNTER — INPATIENT (INPATIENT)
Facility: HOSPITAL | Age: 44
LOS: 6 days | Discharge: HOME | DRG: 303 | End: 2024-12-24
Attending: THORACIC SURGERY (CARDIOTHORACIC VASCULAR SURGERY) | Admitting: THORACIC SURGERY (CARDIOTHORACIC VASCULAR SURGERY)
Payer: COMMERCIAL

## 2024-12-17 VITALS
SYSTOLIC BLOOD PRESSURE: 109 MMHG | DIASTOLIC BLOOD PRESSURE: 77 MMHG | RESPIRATION RATE: 18 BRPM | WEIGHT: 148.59 LBS | TEMPERATURE: 99 F | OXYGEN SATURATION: 95 % | HEART RATE: 111 BPM

## 2024-12-17 VITALS
SYSTOLIC BLOOD PRESSURE: 138 MMHG | TEMPERATURE: 98 F | HEART RATE: 132 BPM | OXYGEN SATURATION: 99 % | DIASTOLIC BLOOD PRESSURE: 80 MMHG

## 2024-12-17 DIAGNOSIS — E10.9 TYPE 1 DIABETES MELLITUS WITHOUT COMPLICATIONS: ICD-10-CM

## 2024-12-17 DIAGNOSIS — I25.10 ATHEROSCLEROTIC HEART DISEASE OF NATIVE CORONARY ARTERY WITHOUT ANGINA PECTORIS: ICD-10-CM

## 2024-12-17 DIAGNOSIS — Z96.41 PRESENCE OF INSULIN PUMP (EXTERNAL) (INTERNAL): Chronic | ICD-10-CM

## 2024-12-17 LAB
ANION GAP SERPL CALC-SCNC: 4 MMOL/L — LOW (ref 5–17)
APTT BLD: 59.5 SEC — HIGH (ref 24.5–35.6)
APTT BLD: 60.3 SEC — HIGH (ref 24.5–35.6)
BUN SERPL-MCNC: 19 MG/DL — SIGNIFICANT CHANGE UP (ref 7–23)
CALCIUM SERPL-MCNC: 9.2 MG/DL — SIGNIFICANT CHANGE UP (ref 8.5–10.1)
CHLORIDE SERPL-SCNC: 105 MMOL/L — SIGNIFICANT CHANGE UP (ref 96–108)
CO2 SERPL-SCNC: 27 MMOL/L — SIGNIFICANT CHANGE UP (ref 22–31)
CREAT SERPL-MCNC: 1.09 MG/DL — SIGNIFICANT CHANGE UP (ref 0.5–1.3)
EGFR: 85 ML/MIN/1.73M2 — SIGNIFICANT CHANGE UP
GLUCOSE BLDC GLUCOMTR-MCNC: 134 MG/DL — HIGH (ref 70–99)
GLUCOSE BLDC GLUCOMTR-MCNC: 154 MG/DL — HIGH (ref 70–99)
GLUCOSE BLDC GLUCOMTR-MCNC: 201 MG/DL — HIGH (ref 70–99)
GLUCOSE BLDC GLUCOMTR-MCNC: 88 MG/DL — SIGNIFICANT CHANGE UP (ref 70–99)
GLUCOSE SERPL-MCNC: 113 MG/DL — HIGH (ref 70–99)
HCT VFR BLD CALC: 48.1 % — SIGNIFICANT CHANGE UP (ref 39–50)
HGB BLD-MCNC: 16.1 G/DL — SIGNIFICANT CHANGE UP (ref 13–17)
MCHC RBC-ENTMCNC: 31.5 PG — SIGNIFICANT CHANGE UP (ref 27–34)
MCHC RBC-ENTMCNC: 33.5 G/DL — SIGNIFICANT CHANGE UP (ref 32–36)
MCV RBC AUTO: 94.1 FL — SIGNIFICANT CHANGE UP (ref 80–100)
PLATELET # BLD AUTO: 200 K/UL — SIGNIFICANT CHANGE UP (ref 150–400)
POTASSIUM SERPL-MCNC: 4.5 MMOL/L — SIGNIFICANT CHANGE UP (ref 3.5–5.3)
POTASSIUM SERPL-SCNC: 4.5 MMOL/L — SIGNIFICANT CHANGE UP (ref 3.5–5.3)
RBC # BLD: 5.11 M/UL — SIGNIFICANT CHANGE UP (ref 4.2–5.8)
RBC # FLD: 12 % — SIGNIFICANT CHANGE UP (ref 10.3–14.5)
SODIUM SERPL-SCNC: 136 MMOL/L — SIGNIFICANT CHANGE UP (ref 135–145)
WBC # BLD: 6.28 K/UL — SIGNIFICANT CHANGE UP (ref 3.8–10.5)
WBC # FLD AUTO: 6.28 K/UL — SIGNIFICANT CHANGE UP (ref 3.8–10.5)

## 2024-12-17 PROCEDURE — 99233 SBSQ HOSP IP/OBS HIGH 50: CPT

## 2024-12-17 PROCEDURE — 99152 MOD SED SAME PHYS/QHP 5/>YRS: CPT

## 2024-12-17 PROCEDURE — 93458 L HRT ARTERY/VENTRICLE ANGIO: CPT | Mod: 26

## 2024-12-17 PROCEDURE — 93010 ELECTROCARDIOGRAM REPORT: CPT

## 2024-12-17 PROCEDURE — 99239 HOSP IP/OBS DSCHRG MGMT >30: CPT

## 2024-12-17 RX ORDER — HEPARIN SODIUM,PORCINE 1000/ML
8000 VIAL (ML) INJECTION
Qty: 0 | Refills: 0 | DISCHARGE
Start: 2024-12-17

## 2024-12-17 RX ORDER — NITROGLYCERIN 0.4MG/HR
1 PATCH, TRANSDERMAL 24 HOURS TRANSDERMAL
Qty: 0 | Refills: 0 | DISCHARGE
Start: 2024-12-17

## 2024-12-17 RX ORDER — HYDRALAZINE HYDROCHLORIDE 10 MG/1
10 TABLET ORAL ONCE
Refills: 0 | Status: COMPLETED | OUTPATIENT
Start: 2024-12-17 | End: 2024-12-17

## 2024-12-17 RX ORDER — ASPIRIN 81 MG
81 TABLET, DELAYED RELEASE (ENTERIC COATED) ORAL DAILY
Refills: 0 | Status: DISCONTINUED | OUTPATIENT
Start: 2024-12-17 | End: 2024-12-19

## 2024-12-17 RX ORDER — HEPARIN SODIUM,PORCINE 1000/ML
VIAL (ML) INJECTION
Qty: 25000 | Refills: 0 | Status: DISCONTINUED | OUTPATIENT
Start: 2024-12-17 | End: 2024-12-17

## 2024-12-17 RX ORDER — ATORVASTATIN CALCIUM 40 MG/1
80 TABLET, FILM COATED ORAL AT BEDTIME
Refills: 0 | Status: DISCONTINUED | OUTPATIENT
Start: 2024-12-17 | End: 2024-12-19

## 2024-12-17 RX ORDER — SODIUM CHLORIDE 9 MG/ML
250 INJECTION, SOLUTION INTRAMUSCULAR; INTRAVENOUS; SUBCUTANEOUS ONCE
Refills: 0 | Status: COMPLETED | OUTPATIENT
Start: 2024-12-17 | End: 2024-12-17

## 2024-12-17 RX ORDER — HEPARIN SODIUM,PORCINE 1000/ML
4100 VIAL (ML) INJECTION EVERY 6 HOURS
Refills: 0 | Status: DISCONTINUED | OUTPATIENT
Start: 2024-12-17 | End: 2024-12-17

## 2024-12-17 RX ORDER — NITROGLYCERIN 0.4MG/HR
1 PATCH, TRANSDERMAL 24 HOURS TRANSDERMAL ONCE
Refills: 0 | Status: COMPLETED | OUTPATIENT
Start: 2024-12-17 | End: 2024-12-17

## 2024-12-17 RX ORDER — HEPARIN SODIUM 1000 [USP'U]/ML
1000 INJECTION, SOLUTION INTRAVENOUS; SUBCUTANEOUS
Qty: 25000 | Refills: 0 | Status: DISCONTINUED | OUTPATIENT
Start: 2024-12-17 | End: 2024-12-19

## 2024-12-17 RX ORDER — SODIUM CHLORIDE 9 MG/ML
3 INJECTION, SOLUTION INTRAMUSCULAR; INTRAVENOUS; SUBCUTANEOUS EVERY 8 HOURS
Refills: 0 | Status: DISCONTINUED | OUTPATIENT
Start: 2024-12-17 | End: 2024-12-19

## 2024-12-17 RX ORDER — ACETAMINOPHEN 500MG 500 MG/1
1000 TABLET, COATED ORAL ONCE
Refills: 0 | Status: COMPLETED | OUTPATIENT
Start: 2024-12-17 | End: 2024-12-17

## 2024-12-17 RX ORDER — METOPROLOL TARTRATE 50 MG
12.5 TABLET ORAL EVERY 12 HOURS
Refills: 0 | Status: DISCONTINUED | OUTPATIENT
Start: 2024-12-17 | End: 2024-12-19

## 2024-12-17 RX ADMIN — SODIUM CHLORIDE 250 MILLILITER(S): 9 INJECTION, SOLUTION INTRAMUSCULAR; INTRAVENOUS; SUBCUTANEOUS at 13:18

## 2024-12-17 RX ADMIN — ACETAMINOPHEN 500MG 400 MILLIGRAM(S): 500 TABLET, COATED ORAL at 17:32

## 2024-12-17 RX ADMIN — Medication 900 UNIT(S)/HR: at 07:22

## 2024-12-17 RX ADMIN — Medication 900 UNIT(S)/HR: at 09:07

## 2024-12-17 RX ADMIN — Medication 800 UNIT(S)/HR: at 19:19

## 2024-12-17 RX ADMIN — Medication 81 MILLIGRAM(S): at 09:06

## 2024-12-17 RX ADMIN — Medication 900 UNIT(S)/HR: at 02:21

## 2024-12-17 RX ADMIN — Medication 800 UNIT(S)/HR: at 19:00

## 2024-12-17 RX ADMIN — Medication 1 INCH(S): at 17:30

## 2024-12-17 RX ADMIN — HYDRALAZINE HYDROCHLORIDE 10 MILLIGRAM(S): 10 TABLET ORAL at 15:25

## 2024-12-17 RX ADMIN — Medication 900 UNIT(S)/HR: at 05:52

## 2024-12-17 NOTE — H&P ADULT - PROBLEM SELECTOR PLAN 1
Admit to CTS Dr. Ibrahim   Cath Report noted above  Start ASA, high dose Statin, low dose BB  Preop w/u in progress- Carotids, PFTs, TFTs, TTE  Tentative OR Date: Tentative 12/20/24  All Labs and imaging to be reviewed by Dr. Ibrahim

## 2024-12-17 NOTE — DISCHARGE NOTE PROVIDER - NSDCCAREPROVSEEN_GEN_ALL_CORE_FT
Moisés, Yanet Ruelas, Genet Obrien, Jean Saucedo, Honey Mccoy, Jacky Mckeon, Jimenez Hanley, Jania Ibrahim, Robert S Palla, Abundio Maradiaga, Maged Velásquez, Susie Estrada, Amber Herrera, Ulysses

## 2024-12-17 NOTE — DISCHARGE NOTE NURSING/CASE MANAGEMENT/SOCIAL WORK - FINANCIAL ASSISTANCE
[facility] provides services at a reduced cost to those who are determined to be eligible through [facility]’s financial assistance program. Information regarding [facility]’s financial assistance program can be found by going to [email or web address] or by calling [phone].

## 2024-12-17 NOTE — PRE-OP CHECKLIST - AS BP NONINV SITE
left upper arm Interceed Absorb Adhesion/Surgicel Hemostat Interceed Absorb Adhesion/Surgicel Hemostat/Surgicel Snow/Other

## 2024-12-17 NOTE — DISCHARGE NOTE PROVIDER - NSDCCPCAREPLAN_GEN_ALL_CORE_FT
PRINCIPAL DISCHARGE DIAGNOSIS  Diagnosis: NSTEMI (non-ST elevation myocardial infarction)  Assessment and Plan of Treatment: Underwent cardiology evaluation including procedure (left heart catheterization) revealing 3 vessels with significant coronary artery disease and as such being transfered for evaluation for coronary artery bypass surgery.      SECONDARY DISCHARGE DIAGNOSES  Diagnosis: History of diabetes mellitus  Assessment and Plan of Treatment:     Diagnosis: Elevated troponin  Assessment and Plan of Treatment:

## 2024-12-17 NOTE — H&P ADULT - ASSESSMENT
44y male with significant PMH of DM-1 (since age 20) with insulin pump in place and  on insulin pump, left frozen shoulder in the past and positive family h/o of heart disease. Presents to [facility] transferred from [facility]. Initially presented in ED with c/o left sided chest pain which started at about 10.00 am the day prior to presenting to the hospital. Patient had 4 episodes of chest pain lasting about 30 seconds The 4th episode of left sided chest pain at about 9.00-9.30 pm that night. Patient and wife called 911, and he was BIBA to [place] ED. He received ASA in ambulance. At that time, patient was chest pain free and feeling better, but feels anxious and nervous. Patient says that he never has chest pain before. Otherwise, he denied SOB, palpitation, N/V, abd pain, diarrhea or dysuria. He also denies smoking, alcohol  or substance abuse. He says that his BS usually runs about 140, and his last A1c level was 6.4 about 2 months ago. In the ED Troponin trending up: 99.14->184.77, EK bpm in NSR and no acute ST-T changes. CXR negative for any acute cardiopulmonary abnormalities. 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 Transferred to [facility] CT Surgery Dr. Ibrahim for CABG eval. Denies any current SOB or chest pains on admission. Otherwise denies abdominal pain, urinary or bowel changes, fevers, chills, N/V/D, sick contacts.

## 2024-12-17 NOTE — H&P ADULT - NSHPLABSRESULTS_GEN_ALL_CORE
< from: Xray Chest 1 View- PORTABLE-Urgent (12.16.24 @ 01:24) >    PROCEDURE DATE:  12/16/2024        INTERPRETATION:  Chest pain.    AP chest.    Heart and mediastinum normal.  Lungs clear.  Costophrenic angles sharp   bilaterally.    IMPRESSION: Normal chest    --- End of Report ---    < end of copied text >        < from: TTE W or WO Ultrasound Enhancing Agent (12.16.24 @ 10:24) >    CONCLUSIONS:      1. Left ventricular systolic function is normal with an ejection fraction of 54 % by Ratliff's method of disks.   2. Normal left ventricular diastolic function.   3. Normal right ventricular cavity size, with normal wall thickness, and normal right ventricular systolic function.   4. Trace mitral regurgitation.   5. Mild tricuspid regurgitation.    < end of copied text >    < from: Cardiac Catheterization (12.17.24 @ 13:51) >    Diagnostic Conclusions:     -80% distal RCA   -Heavily calcified 30% distal LMCA   -Heavily calcified diffuse 80% ostial/proximal LAD disease. 95% mid  lesion after large diagonal branch.  -Normal LVEDP and LVEF   Recommendations:     CTS consult for CABG     < end of copied text >

## 2024-12-17 NOTE — PROGRESS NOTE ADULT - PROBLEM SELECTOR PLAN 3
·  Problem: T1DM (type 1 diabetes mellitus).   ·  Recommendation: T1DM with hyperglycemia, management as per Hospitalist.

## 2024-12-17 NOTE — DISCHARGE NOTE PROVIDER - CARE PROVIDER_API CALL
Syd Ware  Thoracic and Cardiac Surgery  [address] [place] [id number]  Phone: [phone]  Fax: [phone]  Established Patient  Follow Up Time: 1-3 days

## 2024-12-17 NOTE — DISCHARGE NOTE PROVIDER - NS AS DC PROVIDER CONTACT Y/N MULTI
Clinic hours for Dr. Aguero:  Monday    In Surgery  Tuesday 7:30am - 4:30pm  Wednesday 7:30am - 4:30pm  Thursday       7:30am - 4:30pm  Friday  7:30 - 11am    If you need a refill on your prescription, please call your pharmacy and let them know. Please be proactive and call before your medication runs out. The pharmacy will then contact us for the refill. Please allow 24-48 hours for the refill to be processed.     If your Specialty Provider has ordered additional laboratory or radiology testing the results will be discussed at your next visit. This will allow you the opportunity to go over the results in person with your provider. If your results require immediate intervention, you will be contacted sooner by phone call.    You may be receiving a patient satisfaction survey in the mail or in your email.  If you receive an email survey, please look for the subject line of:   \" Your provider name\" would like your feedback\".   Please take the time to complete your survey either via the mail or email, as your feedback is very important to us.  We strive to make your experience exceptional and your comments help us with that goal.  We look forward to hearing from you.            Yes

## 2024-12-17 NOTE — CHART NOTE - NSCHARTNOTEFT_GEN_A_CORE
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 Sig labs: CBC and CMP wnl except .  Troponin trending down with a peak of 184. Pt has been CP free since yesterday.  Extensive education provided too both patient and family regarding lifestyle changes, heart healthy diet/exercise and importance of stress reduction.  Pt taken to cath lab today for ischemic evaluation    ASA class: II  Creatinine: 1.09  GFR:  85  Bleeding  Risk score: 0.8%  Sage Score: 4pts    -Consent obtained by IC for cardiac catheterization w/ coronary angiogram, possible sedation and/or analgesia and possible stent placement. Pt is competent, has capacity, and understands risks and benefits of procedure. Risks and benefits discussed. Risk discussed included, but not limited to MI, stroke, mortality, major bleeding, arrythmia, or infection. All questions answered
Nurse Practitioner Progress note:     HPI:  Chief Complaint: chest pain.    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 He was feeling more like chest pressure and pinching, and radiating to his left arm, lasted for about 30 seconds.  They, he felt similar pain at about 12.20 pm while he was sitting in a couch and was trying to turn on the TV. At the same time, he also felt left arm heaviness and he intentionally dropped the TV remote by himself.  He denies any weakness in his left hand at that time.  He also denies any stroke like symptoms.  I also asked him about prior information that he said that his left arm was weak and TV remote was dropped, he said that  information is not true.  He also denies headache, blurry vision or diplopia.  Then, he has another episode of chest pain at about 7.00 PM, and again lasted for about 30-45 seconds, but had some sob at this time. He did not take any medications at home. Then, her had 4th episode of left sided chest pain at about 9.00-9.30 pm, then his wife suggested to chanelle 911, and he was BIBA to ED. He has received ASA in ambulance. At this time, he is chest pain free, and feeling better, but feels anxious and nervous. He says that he works as a , and has mostly sedentary life style. Patient says that he never has chest pain like this. Otherwise, he denies SOB, palpitation, N/V, abd pain, diarrhea or dysuria.  He also denies smoking, alcohol  or substance abuse. He says that his BS usually runs about 140, and his last A1c level was 6.4 about 2 months ago,   Sig labs: CBC and CMP wnl except .  Troponin trending up: 99.14->184.77  D-dimer normal <150.  EK bpm in NSR and no acute ST-T changes.  CXR negative for any acute cardiopulmonary abnormalities.  Flexeril and SL nitro given in ED, and Cardiology consult has been placed by ED.  I requested ED provider to start heparin drip with bolus.  +NSTEMI pt. referred for [facility] for further evaluation        T(C): 37.3 (24 @ 12:57), Max: 37.3 (24 @ 12:57)  HR: 122 (24 @ 16:50) (73 - 123)  BP: 146/92 (24 @ 17:45) (112/79 - 170/101)  RR: 16 (24 @ 16:50) (16 - 18)  SpO2: 100% (24 @ 16:50) (98% - 100%)  Wt(kg): --    PHYSICAL EXAM:  Neurologic: Non-focal, AxOx3.  No neuro deficits  Vascular: Peripheral pulses palpable 2+ bilaterally  Procedure Site: RT. radial band removed by RN manual pressure applied x20 minutes site benign soft no bleeding no hematoma +1 radial pulse dressing applied with guaze no c/o numbness/tingling, <3sec cap refill, fingers/hand warm to touch       PROCEDURE RESULTS:  < from: Cardiac Catheterization (24 @ 13:51) >  Diagnostic Conclusions:   -80% distal RCA   -Heavily calcified 30% distal LMCA   -Heavily calcified diffuse 80% ostial/proximal LAD disease. 95% mid  lesion after large diagonal branch.  -Normal LVEDP and LVEF     ASSESSMENT/PLAN: 	  The patient is a 44y Male with significant PMH of DM-1 (since age 20) with insulin pump in place and  on insulin pump, left frozen shoulder in the past and positive family h/o of heart disease presented in ED with c/o left sided chest pain   Troponin trending up: 99.14->184.77  +NSTEMI S/P LHC     -CTS consult for CABG Dr. Ware patient to be transferred to [facility]   -VS, labs, diet, activity as per post cath orders  -IV hydration  -Encourage PO fluids  -Sedation instructions reviewed with patient   -Continue current medications  -Pt. to be discharged home today  -Plan of care D/W pt. and MD  -Post cath instructions reviewed with pt., pt. verbalizes and understands instructions  -Follow-up with attending/cardiologist

## 2024-12-17 NOTE — H&P ADULT - NSHPPHYSICALEXAM_GEN_ALL_CORE
PHYSICAL EXAM  General: NAD   HEENT:  NC/AT  Neurology: A&Ox3, NAD  CV : RRR+S1S2  Lungs: Respirations non-labored, B/L BS clear  Abdomen: Soft, NT/ND  Extremities: negative B/L LE edema, negative calf tenderness, +PP B/L   Musculoskeletal: B/L UE and LE 5/5 strength

## 2024-12-17 NOTE — H&P ADULT - HISTORY OF PRESENT ILLNESS
44y Male with significant PMH of DM-1 (since age 20) with insulin pump in place and  on insulin pump, left frozen shoulder in the past and positive family h/o of heart disease. Presents to [facility] transferred from [facility]. Initially presented in ED with c/o left sided chest pain which started at about 10.00 am the day prior to presenting to the hospital. Patient had 4 episodes of chest pain lasting about 30 seconds The 4th episode of left sided chest pain at about 9.00-9.30 pm that night. Patient and wife called 911, and he was BIBA to [place] ED. He has received ASA in ambulance. At this time, he is chest pain free, and feeling better, but feels anxious and nervous. Patient says that he never has chest pain before. Otherwise, he denies SOB, palpitation, N/V, abd pain, diarrhea or dysuria. He also denies smoking, alcohol  or substance abuse. He says that his BS usually runs about 140, and his last A1c level was 6.4 about 2 months ago. In the ED Troponin trending up: 99.14->184.77, EK bpm in NSR and no acute ST-T changes. CXR negative for any acute cardiopulmonary abnormalities. 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 Transferred to [facility] CT Surgery Dr. Ibrahim for CABG eval. Denies any current SOB or chest pains on admission. Otherwise denies abdominal pain, urinary or bowel changes, fevers, chills, N/V/D, sick contacts.

## 2024-12-17 NOTE — DISCHARGE NOTE NURSING/CASE MANAGEMENT/SOCIAL WORK - PATIENT PORTAL LINK FT
You can access the FollowMyHealth Patient Portal offered by [facility] by registering at the following website: [email or web address]. By joining [employer]’s FollowMyHealth portal, you will also be able to view your health information using other applications (apps) compatible with our system.

## 2024-12-17 NOTE — PROGRESS NOTE ADULT - SUBJECTIVE AND OBJECTIVE BOX
CHIEF COMPLAINT:    SUBJECTIVE/SIGNIFICANT INTERVAL EVENTS/OVERNIGHT EVENTS:    Review of Systems: 14 Point review of systems reviewed and reported as negative unless otherwise stated in HPI    FROM H&P:  The patient is a 44y Male with significant PMH of DM-1 (since age 20) with insulin pump in place and  on insulin pump, left frozen shoulder in the past and positive family h/o of heart disease presented in ED with c/o left sided chest pain which started at about 10.00 am yesterday as he was doing laundry at his home. He was feeling more like chest pressure and pinching, and radiating to his left arm, lasted for about 30 seconds.  They, he felt similar pain at about 12.20 pm while he was sitting in a couch and was trying to turn on the TV. At the same time, he also felt left arm heaviness and he intentionally dropped the TV remote by himself.  He denies any weakness in his left hand at that time.  He also denies any stroke like symptoms.  I also asked him about prior information that he said that his left arm was weak and TV remote was dropped, he said that that information is not true.  He also denies headache, blurry vision or diplopia.  Then, he has another episode of chest pain at about 7.00 PM, and again lasted for about 30-45 seconds, but had some sob at this time. He did not take any medications at home. Then, her had 4th episode of left sided chest pain at about 9.00-9.30 pm, then his wife suggested to chanelle 911, and he was BIBA to ED. He has received ASA in ambulance. At this time, he is chest pain free, and feeling better, but feels anxious and nervous. He says that he works as a , and has mostly sedentary life style. Patient says that he never has chest pain like this. Otherwise, he denies SOB, palpitation, N/V, abd pain, diarrhea or dysuria.  He also denies smoking, alcohol  or substance abuse. He says that his BS usually runs about 140, and his last A1c level was 6.4 about 2 months ago,     12/16: chest pain resolved, c/w cards plan for cath today vs tmrw     PHYSICAL EXAM:    T(C): 36.8 (12-16-24 @ 08:35), Max: 36.8 (12-16-24 @ 08:35)  HR: 80 (12-16-24 @ 08:35) (77 - 81)  BP: 111/73 (12-16-24 @ 08:35) (111/73 - 160/97)  RR: 18 (12-16-24 @ 08:35) (18 - 18)  SpO2: 98% (12-16-24 @ 08:35) (97% - 100%)    General: AAOx3; NAD  Head: AT/NC  ENT: Moist Mucous Membranes;   Eyes: EOMI;   CVS: RRR, S1&S2, No murmur, No edema  Respiratory: Lungs CTA B/L; Normal Respiratory Effort  Abdomen/GI: Soft, non-tender, non-distended  Extremities: No cyanosis, No clubbing, No edema  Neuro: AAOx3, CNII-XII grossly intact, non-focal  Psych: Appropriate, Cooperative, No depression, No anxiety  Skin: Clean, Dry and Intact      LABS:                          15.1   8.95  )-----------( 235      ( 16 Dec 2024 00:29 )             45.0     12-16    137  |  106  |  21  ----------------------------<  195[H]  3.9   |  30  |  1.09    Ca    9.2      16 Dec 2024 00:29    TPro  6.6  /  Alb  3.7  /  TBili  0.4  /  DBili  x   /  AST  20  /  ALT  23  /  AlkPhos  48  12-16      CAPILLARY BLOOD GLUCOSE      POCT Blood Glucose.: 126 mg/dL (16 Dec 2024 06:12)  POCT Blood Glucose.: 111 mg/dL (16 Dec 2024 04:25)          RADIOLOGY:      EKG:      ECHO:      PROCEDURES:        I personally reviewed labs, imaging, ekg, orders and vitals.    Discussed case with:  []RN  []CM/SW  []Patient  []Family  []Specialist:        MEDICATIONS  (STANDING):  aspirin  chewable 81 milliGRAM(s) Oral daily  atorvastatin 40 milliGRAM(s) Oral at bedtime  dextrose 5%. 1000 milliLiter(s) (100 mL/Hr) IV Continuous <Continuous>  dextrose 5%. 1000 milliLiter(s) (50 mL/Hr) IV Continuous <Continuous>  dextrose 50% Injectable 25 Gram(s) IV Push once  dextrose 50% Injectable 12.5 Gram(s) IV Push once  dextrose 50% Injectable 25 Gram(s) IV Push once  glucagon  Injectable 1 milliGRAM(s) IntraMuscular once  heparin  Infusion.  Unit(s)/Hr (8 mL/Hr) IV Continuous <Continuous>  influenza   Vaccine 0.5 milliLiter(s) IntraMuscular once  insulin lispro (ADMELOG) corrective regimen sliding scale   SubCutaneous three times a day before meals  insulin lispro (ADMELOG) corrective regimen sliding scale   SubCutaneous at bedtime    MEDICATIONS  (PRN):  acetaminophen     Tablet .. 650 milliGRAM(s) Oral every 6 hours PRN Mild Pain (1 - 3)  aluminum hydroxide/magnesium hydroxide/simethicone Suspension 30 milliLiter(s) Oral every 4 hours PRN Dyspepsia  dextrose Oral Gel 15 Gram(s) Oral once PRN Blood Glucose LESS THAN 70 milliGRAM(s)/deciliter  heparin   Injectable 4100 Unit(s) IV Push every 6 hours PRN For aPTT less than 40  melatonin 3 milliGRAM(s) Oral at bedtime PRN Insomnia  nitroglycerin     SubLingual 0.4 milliGRAM(s) SubLingual every 5 minutes PRN Chest Pain  ondansetron Injectable 4 milliGRAM(s) IV Push every 6 hours PRN Nausea and/or Vomiting    
  CHIEF COMPLAINT: Patient is a 44y old  Male who presents with a chief complaint of NSTEMI with elevated troponin. (16 Dec 2024 10:30)      HPI:  Chief Complaint: chest pain.    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 He was feeling more like chest pressure and pinching, and radiating to his left arm, lasted for about 30 seconds.  They, he felt similar pain at about 12.20 pm while he was sitting in a couch and was trying to turn on the TV. At the same time, he also felt left arm heaviness and he intentionally dropped the TV remote by himself.  He denies any weakness in his left hand at that time.  He also denies any stroke like symptoms.  I also asked him about prior information that he said that his left arm was weak and TV remote was dropped, he said that that information is not true.  He also denies headache, blurry vision or diplopia.  Then, he has another episode of chest pain at about 7.00 PM, and again lasted for about 30-45 seconds, but had some sob at this time. He did not take any medications at home. Then, her had 4th episode of left sided chest pain at about 9.00-9.30 pm, then his wife suggested to chanelle 911, and he was BIBA to ED. He has received ASA in ambulance. At this time, he is chest pain free, and feeling better, but feels anxious and nervous. He says that he works as a , and has mostly sedentary life style. Patient says that he never has chest pain like this. Otherwise, he denies SOB, palpitation, N/V, abd pain, diarrhea or dysuria.  He also denies smoking, alcohol  or substance abuse. He says that his BS usually runs about 140, and his last A1c level was 6.4 about 2 months ago,   Sig labs: CBC and CMP wnl except .  Troponin trending up: 99.14->184.77  D-dimer normal <150.  EK bpm in NSR and no acute ST-T changes.  CXR negative for any acute cardiopulmonary abnormalities.    Flexeril and SL nitro given in ED, and Cardiology consult has been placed by ED.    I requested ED provider to start heparin drip with bolus.     (16 Dec 2024 03:17)    Cardiology consulted for chest pain and elevated trops. Pt. with risk factors - T1DM, positive cardiac Fx, never seen a cardiologist.     24: Pt denies cp or sob. Going for [facility] today. Tele: RSR    Home Medications:  HumaLOG 100 units/mL subcutaneous solution: Use with Insulin Pump - due to be refilled on  (16 Dec 2024 09:28)  Insulin Pump: Use with Humalog- due to be refilled on   Basal rate: 1/2 units/hr, max bolus 4 units/hr  Insulin to Carb Ratio: 1 unit: 6g   ISF: 1 unit : 15 points  BG Target: 120 mg/dL (16 Dec 2024 12:30)    MEDICATIONS  (STANDING):  aspirin  chewable 81 milliGRAM(s) Oral daily  atorvastatin 40 milliGRAM(s) Oral at bedtime  dextrose 5%. 1000 milliLiter(s) (100 mL/Hr) IV Continuous <Continuous>  dextrose 5%. 1000 milliLiter(s) (50 mL/Hr) IV Continuous <Continuous>  dextrose 50% Injectable 25 Gram(s) IV Push once  dextrose 50% Injectable 12.5 Gram(s) IV Push once  dextrose 50% Injectable 25 Gram(s) IV Push once  glucagon  Injectable 1 milliGRAM(s) IntraMuscular once  heparin  Infusion.  Unit(s)/Hr (8 mL/Hr) IV Continuous <Continuous>  influenza   Vaccine 0.5 milliLiter(s) IntraMuscular once  insulin lispro (HumaLOG) Pump 1 Each SubCutaneous Continuous Pump  sodium chloride 0.9% Bolus 250 milliLiter(s) IV Bolus once    MEDICATIONS  (PRN):  acetaminophen     Tablet .. 650 milliGRAM(s) Oral every 6 hours PRN Mild Pain (1 - 3)  aluminum hydroxide/magnesium hydroxide/simethicone Suspension 30 milliLiter(s) Oral every 4 hours PRN Dyspepsia  dextrose Oral Gel 15 Gram(s) Oral once PRN Blood Glucose LESS THAN 70 milliGRAM(s)/deciliter  heparin   Injectable 4100 Unit(s) IV Push every 6 hours PRN For aPTT less than 40  melatonin 3 milliGRAM(s) Oral at bedtime PRN Insomnia  nitroglycerin     SubLingual 0.4 milliGRAM(s) SubLingual every 5 minutes PRN Chest Pain  ondansetron Injectable 4 milliGRAM(s) IV Push every 6 hours PRN Nausea and/or Vomiting      Allergies - No Known Allergies      Vital Signs Last 24 Hrs  T(C): 36.5 (16 Dec 2024 20:47), Max: 36.8 (16 Dec 2024 08:35)  T(F): 97.7 (16 Dec 2024 20:47), Max: 98.2 (16 Dec 2024 08:35)  HR: 78 (16 Dec 2024 20:47) (78 - 80)  BP: 153/92 (16 Dec 2024 20:47) (111/73 - 153/92)  BP(mean): 107 (16 Dec 2024 20:47) (86 - 107)  RR: 18 (16 Dec 2024 08:35) (18 - 18)  SpO2: 99% (16 Dec 2024 20:47) (98% - 99%)    Parameters below as of 16 Dec 2024 20:47  Patient On (Oxygen Delivery Method): room air      I&O's Summary      PHYSICAL EXAM:  Constitutional: NAD, awake and alert  HEENT:  EOMI,  Pupils round, No oral cyanosis.  Pulmonary: Non-labored, breath sounds are clear bilaterally, No wheezing, rales or rhonchi  Cardiovascular: S1 and S2, regular rate and rhythm, no Murmurs, gallops or rubs  Gastrointestinal: Bowel Sounds present, soft, nontender.   Lymph: No peripheral edema. No cervical lymphadenopathy.  Neurological: Alert, no focal deficits  Skin: No rashes.  Psych:  Mood & affect appropriate    LABS: reviewed                                      15.1   8.95  )-----------( 235      ( 16 Dec 2024 00:29 )             45.0       16 Dec 2024 00:29    137    |  106    |  21     ----------------------------<  195    3.9     |  30     |  1.09     Ca    9.2        16 Dec 2024 00:29    TPro  6.6    /  Alb  3.7    /  TBili  0.4    /  DBili  x      /  AST  20     /  ALT  23     /  AlkPhos  48     16 Dec 2024 00:29    PT/INR - ( 16 Dec 2024 04:13 )   PT: 10.9 sec;   INR: 0.92 ratio         PTT - ( 16 Dec 2024 04:13 )  PTT:30.5 sec    RadiologyEKG/TTE: reviewed     < from: TTE W or WO Ultrasound Enhancing Agent (24 @ 10:24) >  CONCLUSIONS:      1. Left ventricular systolic function is normal with an ejection fraction of 54 % by Ratliff's method of disks.   2. Normal left ventricular diastolic function.   3. Normal right ventricular cavity size, with normal wall thickness, and normal right ventricular systolic function.   4. Trace mitral regurgitation.   5. Mild tricuspid regurgitation.      < end of copied text >      < from: Xray Chest 1 View- PORTABLE-Urgent (24 @ 01:24) >  IMPRESSION: Normal chest    < end of copied text >

## 2024-12-17 NOTE — DISCHARGE NOTE PROVIDER - NSDCMRMEDTOKEN_GEN_ALL_CORE_FT
aspirin 81 mg oral tablet, chewable: 1 tab(s) orally once a day  atorvastatin 40 mg oral tablet: 1 tab(s) orally once a day (at bedtime)  heparin 100 units/mL-D5% intravenous solution: 8,000 unit(s) intravenous every hour rate at the time of discharge 8ml/hour  HumaLOG 100 units/mL subcutaneous solution: 1 unit(s) subcutaneous 3 times a day (with meals) Use with Insulin Pump - due to be refilled on 12/17  Insulin Pump: Use with Humalog- due to be refilled on 12/17  Basal rate: 1/2 units/hr, max bolus 4 units/hr  Insulin to Carb Ratio: 1 unit: 6g   ISF: 1 unit : 15 points  BG Target: 120 mg/dL  nitroglycerin: 1 tab(s) sublingually every 3 to 5 minutes as needed for  chest pain

## 2024-12-17 NOTE — DISCHARGE NOTE PROVIDER - HOSPITAL COURSE
FROM H&P:    "  The patient is a 44y Male with significant PMH of DM-1 (since age 20) with insulin pump in place and  on insulin pump, left frozen shoulder in the past and positive family h/o of heart disease presented in ED with c/o left sided chest pain which started at about 10.00 am yesterday as he was doing laundry at his home. He was feeling more like chest pressure and pinching, and radiating to his left arm, lasted for about 30 seconds.  They, he felt similar pain at about 12.20 pm while he was sitting in a couch and was trying to turn on the TV. At the same time, he also felt left arm heaviness and he intentionally dropped the TV remote by himself.  He denies any weakness in his left hand at that time.  He also denies any stroke like symptoms.  I also asked him about prior information that he said that his left arm was weak and TV remote was dropped, he said that that information is not true.  He also denies headache, blurry vision or diplopia.  Then, he has another episode of chest pain at about 7.00 PM, and again lasted for about 30-45 seconds, but had some sob at this time. He did not take any medications at home. Then, her had 4th episode of left sided chest pain at about 9.00-9.30 pm, then his wife suggested to chanelle 911, and he was BIBA to ED. He has received ASA in ambulance. At this time, he is chest pain free, and feeling better, but feels anxious and nervous. He says that he works as a , and has mostly sedentary life style. Patient says that he never has chest pain like this. Otherwise, he denies SOB, palpitation, N/V, abd pain, diarrhea or dysuria.  He also denies smoking, alcohol  or substance abuse. He says that his BS usually runs about 140, and his last A1c level was 6.4 about 2 months ago,   Sig labs: CBC and CMP wnl except .  Troponin trending up: 99.14->184.77  D-dimer normal <150.  EK bpm in NSR and no acute ST-T changes.  CXR negative for any acute cardiopulmonary abnormalities.    Flexeril and SL nitro given in ED, and Cardiology consult has been placed by ED.    I requested ED provider to start heparin drip with bolus."    # NSTEMI with elevated troponin.  -Troponin trending up 99.14->184.77, D-dimer negative.   -EKG and CXR   unremarkable.  -Chest pain free now.  -Admit to telemetry.  -Serial troponin remained in low 100s  -2D Echo->grossly unremarkable with preserved EF  -SL nitro prn.  -Heparin drip with bolus.  -ASA and Statin started.  -Oxygen via NC  -Cardiology consult/recs->s/p LHC (->Triple vessel disease->transfer to tertiary care for CABG evaluation    # Uncontrolled DM-1 with hyperglycemia.  -.  -A1c 6.8%  -ISS with coverage.  -Has insulin pump in place.    # Accelerated HTN.  -Likely stress related. Now controlled.  -Initial BP in ED was 160/97, and now 132/88.  -Continue to monitor.    # DVT prophylaxis: On heparin drip.    PHYSICAL EXAM:    T(C): 37.3 (24 @ 12:57), Max: 37.3 (24 @ 12:57)  HR: 122 (24 @ 16:50) (73 - 123)  BP: 146/92 (24 @ 17:45) (112/79 - 170/101)  RR: 16 (24 @ 16:50) (16 - 18)  SpO2: 100% (24 @ 16:50) (98% - 100%)    General: AAOx3; NAD  Head: AT/NC  ENT: Moist Mucous Membranes; No Injury  Eyes: EOMI; PERRL  Neck: Non-tender; No JVD  CVS: RRR, S1&S2, No murmur, No edema  Respiratory: Lungs CTA B/L; Normal Respiratory Effort  Abdomen/GI: Soft, non-tender, non-distended, no guarding, no rebound, normal bowel sounds  : No bladder distention, No Conrad  Extremities: No cyanosis, No clubbing, No edema  MSK: No CVA tenderness, Normal ROM, No injury  Neuro: AAOx3, CNII-XII grossly intact, non-focal  Psych: Appropriate, Cooperative, No depression, No anxiety  Skin: Clean, Dry and Intact

## 2024-12-17 NOTE — PROGRESS NOTE ADULT - PROBLEM SELECTOR PLAN 1
·  Recommendation: pt presents with chest pain a/w SOB with recurrent chest pain 6/10 at rest lasing for less than 1 min, associated with SOB, mildly elevated trops: peaked at 186.    .  Echo shows NL LVF, Mild TR  . Plan for C today.     . cont/. heparin gtt, asa, statin.

## 2024-12-17 NOTE — PACU DISCHARGE NOTE - COMMENTS
Patient s/p LHC via Right radial Artery. Gauze and tegaderm to RUE. No s/s of bleeding or hematoma. RUE warm and mobile. Patient s/p 1 inch of nitro paste to Left Anterior Chest wall for elevated BP, and patient given IV Tylenol at the same time for a history a headache with Tylenol. Transfer paperwork left in chart for transfer to [facility] for CABG. Report given to LÁZARO Castro on 3N. Patient placed on Zoll monitor and telemetry monitor and transported to  at this time.

## 2024-12-18 PROBLEM — E11.9 TYPE 2 DIABETES MELLITUS WITHOUT COMPLICATIONS: Chronic | Status: ACTIVE | Noted: 2024-12-16

## 2024-12-18 PROBLEM — E10.9 TYPE 1 DIABETES MELLITUS WITHOUT COMPLICATIONS: Chronic | Status: ACTIVE | Noted: 2024-12-16

## 2024-12-18 LAB
A1C WITH ESTIMATED AVERAGE GLUCOSE RESULT: 6.7 % — HIGH (ref 4–5.6)
ADD ON TEST-SPECIMEN IN LAB: SIGNIFICANT CHANGE UP
ALBUMIN SERPL ELPH-MCNC: 4 G/DL — SIGNIFICANT CHANGE UP (ref 3.3–5)
ALBUMIN SERPL ELPH-MCNC: 4.3 G/DL — SIGNIFICANT CHANGE UP (ref 3.3–5)
ALP SERPL-CCNC: 52 U/L — SIGNIFICANT CHANGE UP (ref 40–120)
ALP SERPL-CCNC: 58 U/L — SIGNIFICANT CHANGE UP (ref 40–120)
ALT FLD-CCNC: 16 U/L — SIGNIFICANT CHANGE UP (ref 10–45)
ALT FLD-CCNC: 19 U/L — SIGNIFICANT CHANGE UP (ref 10–45)
ANION GAP SERPL CALC-SCNC: 15 MMOL/L — SIGNIFICANT CHANGE UP (ref 5–17)
ANION GAP SERPL CALC-SCNC: 15 MMOL/L — SIGNIFICANT CHANGE UP (ref 5–17)
APPEARANCE UR: CLEAR — SIGNIFICANT CHANGE UP
APTT BLD: 42.8 SEC — HIGH (ref 24.5–35.6)
APTT BLD: 56.8 SEC — HIGH (ref 24.5–35.6)
AST SERPL-CCNC: 14 U/L — SIGNIFICANT CHANGE UP (ref 10–40)
AST SERPL-CCNC: 14 U/L — SIGNIFICANT CHANGE UP (ref 10–40)
BASOPHILS # BLD AUTO: 0.01 K/UL — SIGNIFICANT CHANGE UP (ref 0–0.2)
BASOPHILS # BLD AUTO: 0.03 K/UL — SIGNIFICANT CHANGE UP (ref 0–0.2)
BASOPHILS NFR BLD AUTO: 0.1 % — SIGNIFICANT CHANGE UP (ref 0–2)
BASOPHILS NFR BLD AUTO: 0.4 % — SIGNIFICANT CHANGE UP (ref 0–2)
BILIRUB SERPL-MCNC: 0.9 MG/DL — SIGNIFICANT CHANGE UP (ref 0.2–1.2)
BILIRUB SERPL-MCNC: 0.9 MG/DL — SIGNIFICANT CHANGE UP (ref 0.2–1.2)
BILIRUB UR-MCNC: NEGATIVE — SIGNIFICANT CHANGE UP
BLD GP AB SCN SERPL QL: NEGATIVE — SIGNIFICANT CHANGE UP
BUN SERPL-MCNC: 17 MG/DL — SIGNIFICANT CHANGE UP (ref 7–23)
BUN SERPL-MCNC: 19 MG/DL — SIGNIFICANT CHANGE UP (ref 7–23)
CALCIUM SERPL-MCNC: 9.6 MG/DL — SIGNIFICANT CHANGE UP (ref 8.4–10.5)
CALCIUM SERPL-MCNC: 9.9 MG/DL — SIGNIFICANT CHANGE UP (ref 8.4–10.5)
CHLORIDE SERPL-SCNC: 102 MMOL/L — SIGNIFICANT CHANGE UP (ref 96–108)
CHLORIDE SERPL-SCNC: 99 MMOL/L — SIGNIFICANT CHANGE UP (ref 96–108)
CO2 SERPL-SCNC: 23 MMOL/L — SIGNIFICANT CHANGE UP (ref 22–31)
CO2 SERPL-SCNC: 23 MMOL/L — SIGNIFICANT CHANGE UP (ref 22–31)
COLOR SPEC: YELLOW — SIGNIFICANT CHANGE UP
CREAT SERPL-MCNC: 0.99 MG/DL — SIGNIFICANT CHANGE UP (ref 0.5–1.3)
CREAT SERPL-MCNC: 1.02 MG/DL — SIGNIFICANT CHANGE UP (ref 0.5–1.3)
DIFF PNL FLD: NEGATIVE — SIGNIFICANT CHANGE UP
EGFR: 92 ML/MIN/1.73M2 — SIGNIFICANT CHANGE UP
EGFR: 96 ML/MIN/1.73M2 — SIGNIFICANT CHANGE UP
EOSINOPHIL # BLD AUTO: 0.02 K/UL — SIGNIFICANT CHANGE UP (ref 0–0.5)
EOSINOPHIL # BLD AUTO: 0.05 K/UL — SIGNIFICANT CHANGE UP (ref 0–0.5)
EOSINOPHIL NFR BLD AUTO: 0.2 % — SIGNIFICANT CHANGE UP (ref 0–6)
EOSINOPHIL NFR BLD AUTO: 0.7 % — SIGNIFICANT CHANGE UP (ref 0–6)
ESTIMATED AVERAGE GLUCOSE: 146 MG/DL — HIGH (ref 68–114)
FIBRINOGEN PPP-MCNC: 251 MG/DL — SIGNIFICANT CHANGE UP (ref 200–445)
GLUCOSE BLDC GLUCOMTR-MCNC: 101 MG/DL — HIGH (ref 70–99)
GLUCOSE BLDC GLUCOMTR-MCNC: 143 MG/DL — HIGH (ref 70–99)
GLUCOSE BLDC GLUCOMTR-MCNC: 150 MG/DL — HIGH (ref 70–99)
GLUCOSE BLDC GLUCOMTR-MCNC: 153 MG/DL — HIGH (ref 70–99)
GLUCOSE BLDC GLUCOMTR-MCNC: 154 MG/DL — HIGH (ref 70–99)
GLUCOSE SERPL-MCNC: 186 MG/DL — HIGH (ref 70–99)
GLUCOSE SERPL-MCNC: 92 MG/DL — SIGNIFICANT CHANGE UP (ref 70–99)
GLUCOSE UR QL: 100 MG/DL
HCT VFR BLD CALC: 44.5 % — SIGNIFICANT CHANGE UP (ref 39–50)
HCT VFR BLD CALC: 46.5 % — SIGNIFICANT CHANGE UP (ref 39–50)
HGB BLD-MCNC: 14.9 G/DL — SIGNIFICANT CHANGE UP (ref 13–17)
HGB BLD-MCNC: 15.8 G/DL — SIGNIFICANT CHANGE UP (ref 13–17)
IMM GRANULOCYTES NFR BLD AUTO: 0.3 % — SIGNIFICANT CHANGE UP (ref 0–0.9)
IMM GRANULOCYTES NFR BLD AUTO: 0.3 % — SIGNIFICANT CHANGE UP (ref 0–0.9)
INR BLD: 0.95 RATIO — SIGNIFICANT CHANGE UP (ref 0.85–1.16)
KETONES UR-MCNC: 40 MG/DL
LEUKOCYTE ESTERASE UR-ACNC: NEGATIVE — SIGNIFICANT CHANGE UP
LYMPHOCYTES # BLD AUTO: 2.09 K/UL — SIGNIFICANT CHANGE UP (ref 1–3.3)
LYMPHOCYTES # BLD AUTO: 2.2 K/UL — SIGNIFICANT CHANGE UP (ref 1–3.3)
LYMPHOCYTES # BLD AUTO: 22.7 % — SIGNIFICANT CHANGE UP (ref 13–44)
LYMPHOCYTES # BLD AUTO: 29 % — SIGNIFICANT CHANGE UP (ref 13–44)
MCHC RBC-ENTMCNC: 31.5 PG — SIGNIFICANT CHANGE UP (ref 27–34)
MCHC RBC-ENTMCNC: 32 PG — SIGNIFICANT CHANGE UP (ref 27–34)
MCHC RBC-ENTMCNC: 33.5 G/DL — SIGNIFICANT CHANGE UP (ref 32–36)
MCHC RBC-ENTMCNC: 34 G/DL — SIGNIFICANT CHANGE UP (ref 32–36)
MCV RBC AUTO: 94.1 FL — SIGNIFICANT CHANGE UP (ref 80–100)
MCV RBC AUTO: 94.1 FL — SIGNIFICANT CHANGE UP (ref 80–100)
MONOCYTES # BLD AUTO: 0.68 K/UL — SIGNIFICANT CHANGE UP (ref 0–0.9)
MONOCYTES # BLD AUTO: 0.68 K/UL — SIGNIFICANT CHANGE UP (ref 0–0.9)
MONOCYTES NFR BLD AUTO: 7.4 % — SIGNIFICANT CHANGE UP (ref 2–14)
MONOCYTES NFR BLD AUTO: 9 % — SIGNIFICANT CHANGE UP (ref 2–14)
MRSA PCR RESULT.: SIGNIFICANT CHANGE UP
NEUTROPHILS # BLD AUTO: 4.61 K/UL — SIGNIFICANT CHANGE UP (ref 1.8–7.4)
NEUTROPHILS # BLD AUTO: 6.38 K/UL — SIGNIFICANT CHANGE UP (ref 1.8–7.4)
NEUTROPHILS NFR BLD AUTO: 60.6 % — SIGNIFICANT CHANGE UP (ref 43–77)
NEUTROPHILS NFR BLD AUTO: 69.3 % — SIGNIFICANT CHANGE UP (ref 43–77)
NITRITE UR-MCNC: NEGATIVE — SIGNIFICANT CHANGE UP
NRBC # BLD: 0 /100 WBCS — SIGNIFICANT CHANGE UP (ref 0–0)
NRBC # BLD: 0 /100 WBCS — SIGNIFICANT CHANGE UP (ref 0–0)
NT-PROBNP SERPL-SCNC: 340 PG/ML — HIGH (ref 0–300)
PA ADP PRP-ACNC: 194 PRU — SIGNIFICANT CHANGE UP (ref 182–335)
PH UR: 5.5 — SIGNIFICANT CHANGE UP (ref 5–8)
PLATELET # BLD AUTO: 197 K/UL — SIGNIFICANT CHANGE UP (ref 150–400)
PLATELET # BLD AUTO: 219 K/UL — SIGNIFICANT CHANGE UP (ref 150–400)
POTASSIUM SERPL-MCNC: 3.6 MMOL/L — SIGNIFICANT CHANGE UP (ref 3.5–5.3)
POTASSIUM SERPL-MCNC: 4.2 MMOL/L — SIGNIFICANT CHANGE UP (ref 3.5–5.3)
POTASSIUM SERPL-SCNC: 3.6 MMOL/L — SIGNIFICANT CHANGE UP (ref 3.5–5.3)
POTASSIUM SERPL-SCNC: 4.2 MMOL/L — SIGNIFICANT CHANGE UP (ref 3.5–5.3)
PROT SERPL-MCNC: 6.4 G/DL — SIGNIFICANT CHANGE UP (ref 6–8.3)
PROT SERPL-MCNC: 6.9 G/DL — SIGNIFICANT CHANGE UP (ref 6–8.3)
PROT UR-MCNC: NEGATIVE MG/DL — SIGNIFICANT CHANGE UP
PROTHROM AB SERPL-ACNC: 10.9 SEC — SIGNIFICANT CHANGE UP (ref 9.9–13.4)
RBC # BLD: 4.73 M/UL — SIGNIFICANT CHANGE UP (ref 4.2–5.8)
RBC # BLD: 4.94 M/UL — SIGNIFICANT CHANGE UP (ref 4.2–5.8)
RBC # FLD: 12 % — SIGNIFICANT CHANGE UP (ref 10.3–14.5)
RBC # FLD: 12.2 % — SIGNIFICANT CHANGE UP (ref 10.3–14.5)
RH IG SCN BLD-IMP: POSITIVE — SIGNIFICANT CHANGE UP
S AUREUS DNA NOSE QL NAA+PROBE: SIGNIFICANT CHANGE UP
SODIUM SERPL-SCNC: 137 MMOL/L — SIGNIFICANT CHANGE UP (ref 135–145)
SODIUM SERPL-SCNC: 140 MMOL/L — SIGNIFICANT CHANGE UP (ref 135–145)
SP GR SPEC: 1.03 — SIGNIFICANT CHANGE UP (ref 1–1.03)
T4 FREE SERPL-MCNC: 1.6 NG/DL — SIGNIFICANT CHANGE UP (ref 0.9–1.7)
TSH SERPL-MCNC: 2.14 UIU/ML — SIGNIFICANT CHANGE UP (ref 0.27–4.2)
UROBILINOGEN FLD QL: 0.2 MG/DL — SIGNIFICANT CHANGE UP (ref 0.2–1)
WBC # BLD: 7.59 K/UL — SIGNIFICANT CHANGE UP (ref 3.8–10.5)
WBC # BLD: 9.21 K/UL — SIGNIFICANT CHANGE UP (ref 3.8–10.5)
WBC # FLD AUTO: 7.59 K/UL — SIGNIFICANT CHANGE UP (ref 3.8–10.5)
WBC # FLD AUTO: 9.21 K/UL — SIGNIFICANT CHANGE UP (ref 3.8–10.5)

## 2024-12-18 PROCEDURE — 94010 BREATHING CAPACITY TEST: CPT | Mod: 26

## 2024-12-18 PROCEDURE — 99231 SBSQ HOSP IP/OBS SF/LOW 25: CPT

## 2024-12-18 PROCEDURE — 93880 EXTRACRANIAL BILAT STUDY: CPT | Mod: 26

## 2024-12-18 RX ORDER — INSULIN GLARGINE-YFGN 100 [IU]/ML
8 INJECTION, SOLUTION SUBCUTANEOUS AT BEDTIME
Refills: 0 | Status: DISCONTINUED | OUTPATIENT
Start: 2024-12-18 | End: 2024-12-19

## 2024-12-18 RX ORDER — ASCORBIC ACID 1000 MG
2000 TABLET ORAL ONCE
Refills: 0 | Status: COMPLETED | OUTPATIENT
Start: 2024-12-18 | End: 2024-12-18

## 2024-12-18 RX ORDER — ALPRAZOLAM 0.25 MG/1
0.25 TABLET ORAL EVERY 8 HOURS
Refills: 0 | Status: DISCONTINUED | OUTPATIENT
Start: 2024-12-18 | End: 2024-12-19

## 2024-12-18 RX ORDER — CHLORHEXIDINE GLUCONATE 1.2 MG/ML
15 RINSE ORAL ONCE
Refills: 0 | Status: DISCONTINUED | OUTPATIENT
Start: 2024-12-18 | End: 2024-12-19

## 2024-12-18 RX ORDER — CEFUROXIME AXETIL 250 MG
1500 TABLET ORAL ONCE
Refills: 0 | Status: DISCONTINUED | OUTPATIENT
Start: 2024-12-18 | End: 2024-12-19

## 2024-12-18 RX ORDER — GABAPENTIN 300 MG/1
300 CAPSULE ORAL ONCE
Refills: 0 | Status: COMPLETED | OUTPATIENT
Start: 2024-12-18 | End: 2024-12-19

## 2024-12-18 RX ORDER — ACETAMINOPHEN 80 MG/.8ML
1000 SOLUTION/ DROPS ORAL ONCE
Refills: 0 | Status: COMPLETED | OUTPATIENT
Start: 2024-12-18 | End: 2024-12-19

## 2024-12-18 RX ORDER — INSULIN LISPRO 100/ML
VIAL (ML) SUBCUTANEOUS
Refills: 0 | Status: DISCONTINUED | OUTPATIENT
Start: 2024-12-18 | End: 2024-12-19

## 2024-12-18 RX ORDER — INSULIN LISPRO 100/ML
VIAL (ML) SUBCUTANEOUS AT BEDTIME
Refills: 0 | Status: DISCONTINUED | OUTPATIENT
Start: 2024-12-18 | End: 2024-12-19

## 2024-12-18 RX ORDER — CHLORHEXIDINE GLUCONATE 1.2 MG/ML
1 RINSE ORAL ONCE
Refills: 0 | Status: COMPLETED | OUTPATIENT
Start: 2024-12-18 | End: 2024-12-18

## 2024-12-18 RX ADMIN — ALPRAZOLAM 0.25 MILLIGRAM(S): 0.25 TABLET ORAL at 20:51

## 2024-12-18 RX ADMIN — Medication 12.5 MILLIGRAM(S): at 17:02

## 2024-12-18 RX ADMIN — SODIUM CHLORIDE 3 MILLILITER(S): 9 INJECTION, SOLUTION INTRAMUSCULAR; INTRAVENOUS; SUBCUTANEOUS at 05:35

## 2024-12-18 RX ADMIN — ATORVASTATIN CALCIUM 80 MILLIGRAM(S): 40 TABLET, FILM COATED ORAL at 20:50

## 2024-12-18 RX ADMIN — SODIUM CHLORIDE 3 MILLILITER(S): 9 INJECTION, SOLUTION INTRAMUSCULAR; INTRAVENOUS; SUBCUTANEOUS at 13:20

## 2024-12-18 RX ADMIN — CHLORHEXIDINE GLUCONATE 1 APPLICATION(S): 1.2 RINSE ORAL at 21:02

## 2024-12-18 RX ADMIN — Medication 12.5 MILLIGRAM(S): at 05:40

## 2024-12-18 RX ADMIN — SODIUM CHLORIDE 3 MILLILITER(S): 9 INJECTION, SOLUTION INTRAMUSCULAR; INTRAVENOUS; SUBCUTANEOUS at 21:02

## 2024-12-18 RX ADMIN — Medication 2000 MILLIGRAM(S): at 20:50

## 2024-12-18 RX ADMIN — Medication 81 MILLIGRAM(S): at 10:45

## 2024-12-18 RX ADMIN — INSULIN GLARGINE-YFGN 8 UNIT(S): 100 INJECTION, SOLUTION SUBCUTANEOUS at 21:51

## 2024-12-18 NOTE — PROGRESS NOTE ADULT - ASSESSMENT
44y male with significant PMH of DM-1 (since age 20) with insulin pump in place and  on insulin pump, left frozen shoulder in the past and positive family h/o of heart disease. Presents to [facility] transferred from [facility]. Initially presented in ED with c/o left sided chest pain which started at about 10.00 am the day prior to presenting to the hospital. Patient had 4 episodes of chest pain lasting about 30 seconds The 4th episode of left sided chest pain at about 9.00-9.30 pm that night. Patient and wife called 911, and he was BIBA to [place] ED. He received ASA in ambulance. At that time, patient was chest pain free and feeling better, but feels anxious and nervous. Patient says that he never has chest pain before. Otherwise, he denied SOB, palpitation, N/V, abd pain, diarrhea or dysuria. He also denies smoking, alcohol  or substance abuse. He says that his BS usually runs about 140, and his last A1c level was 6.4 about 2 months ago. In the ED Troponin trending up: 99.14->184.77, EK bpm in NSR and no acute ST-T changes. CXR negative for any acute cardiopulmonary abnormalities. 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 Transferred to [facility] CT Surgery Dr. Ibrahim for CABG eval. Denies any current SOB or chest pains on admission. Otherwise denies abdominal pain, urinary or bowel changes, fevers, chills, N/V/D, sick contacts.     24- VSS CAD- insulin pump in place - will consult endo to manage glucose- OR date TBD

## 2024-12-18 NOTE — CONSULT NOTE ADULT - PROBLEM SELECTOR RECOMMENDATION 9
Will continue current insulin regimen for now.   will remove insulin pump - and place on basal insulin.   Will continue monitoring FS, log, and glucose trends, will Follow up.  Patient counseled for compliance with consistent low carb diet and exercise as tolerated outpatient. Will continue current insulin regimen for now.   will remove insulin pump - and place on basal insulin.   Will continue monitoring FS, log, and glucose trends, will Follow up.  Patient counseled for compliance with consistent low carb diet and exercise as tolerated outpatient.    Postop IV insulin

## 2024-12-18 NOTE — PATIENT PROFILE ADULT - FALL HARM RISK - HARM RISK INTERVENTIONS

## 2024-12-18 NOTE — CONSULT NOTE ADULT - ASSESSMENT
Assessment  DMT2: 44y Male with DM T2 with hyperglycemia, A1C 6.7% , was on  insulin pump at home, now for cabg surgery, uses omnipod pump with dexcom 7, humalog.   Will need tight glycemic control for postop wound healing.   CAD: on medications, stable, monitored. cagb eval   HTN: on antihypertensive medications, monitored, asymptomatic.      Discussed plan and management wit Dr Moo Cortés MD  Cell: [phone]  Office: [phone]                 Assessment  DMT2: 44y Male with DM T2 with hyperglycemia, A1C 6.7% , was on  insulin pump at home, now for cabg surgery, uses omnipod pump with dexcom 7, humalog.   Will need tight glycemic control for postop wound healing.   CAD: on medications, stable, monitored. cagb eval   HTN: on antihypertensive medications, monitored, asymptomatic.      Discussed plan and management wit Dr Moo Cortés MD  Cell: [phone]  Office: [phone]                 Assessment  DMT1: 44y Male with DM T1 with hyperglycemia, A1C 6.7% , was on  insulin pump at home, now for cabg surgery, uses omnipod pump with dexcom 7, humalog. Now pump removed in anticipation for CT surgery ..  Will need tight glycemic control for postop wound healing.   CAD: on medications, stable, monitored. cagb eval   HTN: on antihypertensive medications, monitored, asymptomatic.      Discussed plan and management wit Dr Moo Cortés MD  Cell: [phone]  Office: [phone]

## 2024-12-18 NOTE — CONSULT NOTE ADULT - SUBJECTIVE AND OBJECTIVE BOX
HPI:  44y Male with significant PMH of DM-1 (since age 20) with insulin pump in place and  on insulin pump, left frozen shoulder in the past and positive family h/o of heart disease. Presents to [facility] transferred from [facility]. Initially presented in ED with c/o left sided chest pain which started at about 10.00 am the day prior to presenting to the hospital. Patient had 4 episodes of chest pain lasting about 30 seconds The 4th episode of left sided chest pain at about 9.00-9.30 pm that night. Patient and wife called 911, and he was BIBA to [place] ED. He has received ASA in ambulance. At this time, he is chest pain free, and feeling better, but feels anxious and nervous. Patient says that he never has chest pain before. Otherwise, he denies SOB, palpitation, N/V, abd pain, diarrhea or dysuria. He also denies smoking, alcohol  or substance abuse. He says that his BS usually runs about 140, and his last A1c level was 6.4 about 2 months ago. In the ED Troponin trending up: 99.14->184.77, EK bpm in NSR and no acute ST-T changes. CXR negative for any acute cardiopulmonary abnormalities. 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 Transferred to [facility] CT Surgery Dr. Ibrahim for CABG eval. Denies any current SOB or chest pains on admission. Otherwise denies abdominal pain, urinary or bowel changes, fevers, chills, N/V/D, sick contacts.    (17 Dec 2024 22:23)      Patient has history of diabetes, A1C  6.7 % on home insulin pump   Endo was consulted for glycemic control.      PAST MEDICAL & SURGICAL HISTORY:  DM (diabetes mellitus)      Diabetes mellitus type 1      Insulin pump in place      Insulin pump in place          FAMILY HISTORY:  FHx: heart disease (Father, Mother)    FH: type 2 diabetes (Father)        Social History:            HOME MEDICATIONS:  Home Medications:  aspirin 81 mg oral tablet, chewable: 1 tab(s) orally once a day (17 Dec 2024 17:54)  atorvastatin 40 mg oral tablet: 1 tab(s) orally once a day (at bedtime) (17 Dec 2024 17:54)  heparin 100 units/mL-D5% intravenous solution: 8,000 unit(s) intravenous every hour rate at the time of discharge 8ml/hour (17 Dec 2024 17:54)  HumaLOG 100 units/mL subcutaneous solution: 1 unit(s) subcutaneous 3 times a day (with meals) Use with Insulin Pump - due to be refilled on  (17 Dec 2024 17:54)  Insulin Pump: Use with Humalog- due to be refilled on   Basal rate: 1/2 units/hr, max bolus 4 units/hr  Insulin to Carb Ratio: 1 unit: 6g   ISF: 1 unit : 15 points  BG Target: 120 mg/dL (16 Dec 2024 12:30)  nitroglycerin: 1 tab(s) sublingually every 3 to 5 minutes as needed for  chest pain (17 Dec 2024 17:54)            MEDICATIONS  (STANDING):  acetaminophen     Tablet .. 1000 milliGRAM(s) Oral once  ascorbic acid 2000 milliGRAM(s) Oral once  aspirin enteric coated 81 milliGRAM(s) Oral daily  atorvastatin 80 milliGRAM(s) Oral at bedtime  cefuroxime  IVPB 1500 milliGRAM(s) IV Intermittent once  chlorhexidine 0.12% Liquid 15 milliLiter(s) Swish and Spit once  chlorhexidine 4% Liquid 1 Application(s) Topical once  gabapentin 300 milliGRAM(s) Oral once  heparin  Infusion 1000 Unit(s)/Hr (10 mL/Hr) IV Continuous <Continuous>  metoprolol tartrate 12.5 milliGRAM(s) Oral every 12 hours  sodium chloride 0.9% lock flush 3 milliLiter(s) IV Push every 8 hours    MEDICATIONS  (PRN):  ALPRAZolam 0.25 milliGRAM(s) Oral every 8 hours PRN anxiety      Allergies    No Known Allergies    Intolerances        Review of Systems:  Neuro: No HA, no dizziness  Cardiovascular: No chest pain, no palpitations  Respiratory: no SOB, no cough  GI: No nausea, vomiting, abdominal pain  MSK: Denies joint/muscle pain      ALL OTHER SYSTEMS REVIEWED AND NEGATIVE      PHYSICAL EXAM:  VITALS: T(C): 36.1 (24 @ 12:32)  T(F): 97 (24 @ 12:32), Max: 99.4 (24 @ 21:30)  HR: 98 (24 @ 12:32) (94 - 132)  BP: 160/98 (24 @ 12:32) (109/77 - 170/101)  RR:  ( - 18)  SpO2:  (95% - 100%)  Wt(kg): --  GENERAL: NAD, well-groomed, well-developed  NEURO:  alert and oriented  RESPIRATORY: Clear to auscultation bilaterally; No rales, rhonchi, wheezing  CARDIOVASCULAR: Si S2  GI: Soft, non distended, normal bowel sounds  MUSCULOSKELETAL: Moves all extremities equally       POCT Blood Glucose.: 154 mg/dL (24 @ 11:41)  POCT Blood Glucose.: 101 mg/dL (24 @ 07:29)  POCT Blood Glucose.: 150 mg/dL (24 @ 01:59)  POCT Blood Glucose.: 201 mg/dL (24 @ 22:09)  POCT Blood Glucose.: 154 mg/dL (24 @ 18:24)  POCT Blood Glucose.: 88 mg/dL (24 @ 12:03)  POCT Blood Glucose.: 134 mg/dL (24 @ 07:55)  POCT Blood Glucose.: 128 mg/dL (24 @ 16:29)  POCT Blood Glucose.: 105 mg/dL (24 @ 11:28)  POCT Blood Glucose.: 126 mg/dL (24 @ 06:12)  POCT Blood Glucose.: 111 mg/dL (24 @ 04:25)                            14.9   7.59  )-----------( 197      ( 18 Dec 2024 06:28 )             44.5           140  |  102  |  17  ----------------------------<  92  3.6   |  23  |  0.99    eGFR: 96    Ca    9.6          TPro  6.4  /  Alb  4.0  /  TBili  0.9  /  DBili  x   /  AST  14  /  ALT  16  /  AlkPhos  52        Thyroid Function Tests:    Diet, NPO after Midnight:      NPO Start Date: 18-Dec-2024,   NPO Start Time: 23:59  Except Medications     Special Instructions for Nursing:  Except Medications (24 @ 10:30) [Active]  Diet, Consistent Carbohydrate/No Snacks (24 @ 23:30) [Active]        12- Chol 210[H] Direct LDL -- LDL calculated 122[H] HDL 44 Trig 254[H]  A1C with Estimated Average Glucose Result: 6.7 % (24 @ 00:09)  A1C with Estimated Average Glucose Result: 6.8 % (24 @ 04:13)                     HPI:    44y Male with significant PMH of DM-1 (since age 20) with insulin pump in place and  on insulin pump, left frozen shoulder in the past and positive family h/o of heart disease. Presents to [facility] transferred from [facility]. Initially presented in ED with c/o left sided chest pain which started at about 10.00 am the day prior to presenting to the hospital. Patient had 4 episodes of chest pain lasting about 30 seconds The 4th episode of left sided chest pain at about 9.00-9.30 pm that night. Patient and wife called 911, and he was BIBA to [place] ED. He has received ASA in ambulance. At this time, he is chest pain free, and feeling better, but feels anxious and nervous. Patient says that he never has chest pain before. Otherwise, he denies SOB, palpitation, N/V, abd pain, diarrhea or dysuria. He also denies smoking, alcohol  or substance abuse. He says that his BS usually runs about 140, and his last A1c level was 6.4 about 2 months ago. In the ED Troponin trending up: 99.14->184.77, EK bpm in NSR and no acute ST-T changes. CXR negative for any acute cardiopulmonary abnormalities. 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 Transferred to [facility] CT Surgery Dr. Ibrahim for CABG eval. Denies any current SOB or chest pains on admission. Otherwise denies abdominal pain, urinary or bowel changes, fevers, chills, N/V/D, sick contacts.    (17 Dec 2024 22:23)      Patient has history of diabetes, A1C  6.7 % on home insulin pump   Endo was consulted for glycemic control.      PAST MEDICAL & SURGICAL HISTORY:  DM (diabetes mellitus)      Diabetes mellitus type 1      Insulin pump in place      Insulin pump in place          FAMILY HISTORY:  FHx: heart disease (Father, Mother)    FH: type 2 diabetes (Father)        Social History:            HOME MEDICATIONS:  Home Medications:  aspirin 81 mg oral tablet, chewable: 1 tab(s) orally once a day (17 Dec 2024 17:54)  atorvastatin 40 mg oral tablet: 1 tab(s) orally once a day (at bedtime) (17 Dec 2024 17:54)  heparin 100 units/mL-D5% intravenous solution: 8,000 unit(s) intravenous every hour rate at the time of discharge 8ml/hour (17 Dec 2024 17:54)  HumaLOG 100 units/mL subcutaneous solution: 1 unit(s) subcutaneous 3 times a day (with meals) Use with Insulin Pump - due to be refilled on  (17 Dec 2024 17:54)  Insulin Pump: Use with Humalog- due to be refilled on   Basal rate: 1/2 units/hr, max bolus 4 units/hr  Insulin to Carb Ratio: 1 unit: 6g   ISF: 1 unit : 15 points  BG Target: 120 mg/dL (16 Dec 2024 12:30)  nitroglycerin: 1 tab(s) sublingually every 3 to 5 minutes as needed for  chest pain (17 Dec 2024 17:54)            MEDICATIONS  (STANDING):  acetaminophen     Tablet .. 1000 milliGRAM(s) Oral once  ascorbic acid 2000 milliGRAM(s) Oral once  aspirin enteric coated 81 milliGRAM(s) Oral daily  atorvastatin 80 milliGRAM(s) Oral at bedtime  cefuroxime  IVPB 1500 milliGRAM(s) IV Intermittent once  chlorhexidine 0.12% Liquid 15 milliLiter(s) Swish and Spit once  chlorhexidine 4% Liquid 1 Application(s) Topical once  gabapentin 300 milliGRAM(s) Oral once  heparin  Infusion 1000 Unit(s)/Hr (10 mL/Hr) IV Continuous <Continuous>  metoprolol tartrate 12.5 milliGRAM(s) Oral every 12 hours  sodium chloride 0.9% lock flush 3 milliLiter(s) IV Push every 8 hours    MEDICATIONS  (PRN):  ALPRAZolam 0.25 milliGRAM(s) Oral every 8 hours PRN anxiety      Allergies    No Known Allergies    Intolerances        Review of Systems:  Neuro: No HA, no dizziness  Cardiovascular: No chest pain, no palpitations  Respiratory: no SOB, no cough  GI: No nausea, vomiting, abdominal pain  MSK: Denies joint/muscle pain      ALL OTHER SYSTEMS REVIEWED AND NEGATIVE      PHYSICAL EXAM:  VITALS: T(C): 36.1 (24 @ 12:32)  T(F): 97 (24 @ 12:32), Max: 99.4 (24 @ 21:30)  HR: 98 (24 @ 12:32) (94 - 132)  BP: 160/98 (24 @ 12:32) (109/77 - 170/101)  RR:  ( - 18)  SpO2:  (95% - 100%)  Wt(kg): --  GENERAL: NAD, well-groomed, well-developed  NEURO:  alert and oriented  RESPIRATORY: Clear to auscultation bilaterally; No rales, rhonchi, wheezing  CARDIOVASCULAR: Si S2  GI: Soft, non distended, normal bowel sounds  MUSCULOSKELETAL: Moves all extremities equally       POCT Blood Glucose.: 154 mg/dL (24 @ 11:41)  POCT Blood Glucose.: 101 mg/dL (24 @ 07:29)  POCT Blood Glucose.: 150 mg/dL (24 @ 01:59)  POCT Blood Glucose.: 201 mg/dL (24 @ 22:09)  POCT Blood Glucose.: 154 mg/dL (24 @ 18:24)  POCT Blood Glucose.: 88 mg/dL (24 @ 12:03)  POCT Blood Glucose.: 134 mg/dL (24 @ 07:55)  POCT Blood Glucose.: 128 mg/dL (24 @ 16:29)  POCT Blood Glucose.: 105 mg/dL (24 @ 11:28)  POCT Blood Glucose.: 126 mg/dL (24 @ 06:12)  POCT Blood Glucose.: 111 mg/dL (24 @ 04:25)                            14.9   7.59  )-----------( 197      ( 18 Dec 2024 06:28 )             44.5           140  |  102  |  17  ----------------------------<  92  3.6   |  23  |  0.99    eGFR: 96    Ca    9.6          TPro  6.4  /  Alb  4.0  /  TBili  0.9  /  DBili  x   /  AST  14  /  ALT  16  /  AlkPhos  52        Thyroid Function Tests:    Diet, NPO after Midnight:      NPO Start Date: 18-Dec-2024,   NPO Start Time: 23:59  Except Medications     Special Instructions for Nursing:  Except Medications (24 @ 10:30) [Active]  Diet, Consistent Carbohydrate/No Snacks (24 @ 23:30) [Active]        12- Chol 210[H] Direct LDL -- LDL calculated 122[H] HDL 44 Trig 254[H]  A1C with Estimated Average Glucose Result: 6.7 % (24 @ 00:09)  A1C with Estimated Average Glucose Result: 6.8 % (24 @ 04:13)                     HPI:    44y Male with significant PMH of DM-1 (since age 20) with insulin pump in place and  on insulin pump, left frozen shoulder in the past and positive family h/o of heart disease. Presents to [facility] transferred from [facility]. Initially presented in ED with c/o left sided chest pain which started at about 10.00 am the day prior to presenting to the hospital. Patient had 4 episodes of chest pain lasting about 30 seconds The 4th episode of left sided chest pain at about 9.00-9.30 pm that night. Patient and wife called 911, and he was BIBA to [place] ED. He has received ASA in ambulance. At this time, he is chest pain free, and feeling better, but feels anxious and nervous. Patient says that he never has chest pain before. Otherwise, he denies SOB, palpitation, N/V, abd pain, diarrhea or dysuria. He also denies smoking, alcohol  or substance abuse. He says that his BS usually runs about 140, and his last A1c level was 6.4 about 2 months ago. In the ED Troponin trending up: 99.14->184.77, EK bpm in NSR and no acute ST-T changes. CXR negative for any acute cardiopulmonary abnormalities. 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 Transferred to [facility] CT Surgery Dr. Ibrahim for CABG eval. Denies any current SOB or chest pains on admission. Otherwise denies abdominal pain, urinary or bowel changes, fevers, chills, N/V/D, sick contacts.    (17 Dec 2024 22:23)      Patient has history of diabetes, A1C  6.7 % on home insulin pump , TD1   Endo was consulted for glycemic control.      PAST MEDICAL & SURGICAL HISTORY:  DM (diabetes mellitus)      Diabetes mellitus type 1      Insulin pump in place      Insulin pump in place          FAMILY HISTORY:  FHx: heart disease (Father, Mother)    FH: type 2 diabetes (Father)        Social History:            HOME MEDICATIONS:  Home Medications:  aspirin 81 mg oral tablet, chewable: 1 tab(s) orally once a day (17 Dec 2024 17:54)  atorvastatin 40 mg oral tablet: 1 tab(s) orally once a day (at bedtime) (17 Dec 2024 17:54)  heparin 100 units/mL-D5% intravenous solution: 8,000 unit(s) intravenous every hour rate at the time of discharge 8ml/hour (17 Dec 2024 17:54)  HumaLOG 100 units/mL subcutaneous solution: 1 unit(s) subcutaneous 3 times a day (with meals) Use with Insulin Pump - due to be refilled on  (17 Dec 2024 17:54)  Insulin Pump: Use with Humalog- due to be refilled on   Basal rate: 1/2 units/hr, max bolus 4 units/hr  Insulin to Carb Ratio: 1 unit: 6g   ISF: 1 unit : 15 points  BG Target: 120 mg/dL (16 Dec 2024 12:30)  nitroglycerin: 1 tab(s) sublingually every 3 to 5 minutes as needed for  chest pain (17 Dec 2024 17:54)            MEDICATIONS  (STANDING):  acetaminophen     Tablet .. 1000 milliGRAM(s) Oral once  ascorbic acid 2000 milliGRAM(s) Oral once  aspirin enteric coated 81 milliGRAM(s) Oral daily  atorvastatin 80 milliGRAM(s) Oral at bedtime  cefuroxime  IVPB 1500 milliGRAM(s) IV Intermittent once  chlorhexidine 0.12% Liquid 15 milliLiter(s) Swish and Spit once  chlorhexidine 4% Liquid 1 Application(s) Topical once  gabapentin 300 milliGRAM(s) Oral once  heparin  Infusion 1000 Unit(s)/Hr (10 mL/Hr) IV Continuous <Continuous>  metoprolol tartrate 12.5 milliGRAM(s) Oral every 12 hours  sodium chloride 0.9% lock flush 3 milliLiter(s) IV Push every 8 hours    MEDICATIONS  (PRN):  ALPRAZolam 0.25 milliGRAM(s) Oral every 8 hours PRN anxiety      Allergies    No Known Allergies    Intolerances        Review of Systems:  Neuro: No HA, no dizziness  Cardiovascular: No chest pain, no palpitations  Respiratory: no SOB, no cough  GI: No nausea, vomiting, abdominal pain  MSK: Denies joint/muscle pain      ALL OTHER SYSTEMS REVIEWED AND NEGATIVE      PHYSICAL EXAM:  VITALS: T(C): 36.1 (24 @ 12:32)  T(F): 97 (24 @ 12:32), Max: 99.4 (24 @ 21:30)  HR: 98 (24 @ 12:32) (94 - 132)  BP: 160/98 (24 @ 12:32) (109/77 - 170/101)  RR:  ( - 18)  SpO2:  (95% - 100%)  Wt(kg): --  GENERAL: NAD, well-groomed, well-developed  NEURO:  alert and oriented  RESPIRATORY: Clear to auscultation bilaterally; No rales, rhonchi, wheezing  CARDIOVASCULAR: Si S2  GI: Soft, non distended, normal bowel sounds  MUSCULOSKELETAL: Moves all extremities equally       POCT Blood Glucose.: 154 mg/dL (24 @ 11:41)  POCT Blood Glucose.: 101 mg/dL (24 @ 07:29)  POCT Blood Glucose.: 150 mg/dL (24 @ 01:59)  POCT Blood Glucose.: 201 mg/dL (24 @ 22:09)  POCT Blood Glucose.: 154 mg/dL (24 @ 18:24)  POCT Blood Glucose.: 88 mg/dL (24 @ 12:03)  POCT Blood Glucose.: 134 mg/dL (24 @ 07:55)  POCT Blood Glucose.: 128 mg/dL (24 @ 16:29)  POCT Blood Glucose.: 105 mg/dL (24 @ 11:28)  POCT Blood Glucose.: 126 mg/dL (24 @ 06:12)  POCT Blood Glucose.: 111 mg/dL (24 @ 04:25)                            14.9   7.59  )-----------( 197      ( 18 Dec 2024 06:28 )             44.5           140  |  102  |  17  ----------------------------<  92  3.6   |  23  |  0.99    eGFR: 96    Ca    9.6          TPro  6.4  /  Alb  4.0  /  TBili  0.9  /  DBili  x   /  AST  14  /  ALT  16  /  AlkPhos  52        Thyroid Function Tests:    Diet, NPO after Midnight:      NPO Start Date: 18-Dec-2024,   NPO Start Time: 23:59  Except Medications     Special Instructions for Nursing:  Except Medications (24 @ 10:30) [Active]  Diet, Consistent Carbohydrate/No Snacks (24 @ 23:30) [Active]        12- Chol 210[H] Direct LDL -- LDL calculated 122[H] HDL 44 Trig 254[H]  A1C with Estimated Average Glucose Result: 6.7 % (24 @ 00:09)  A1C with Estimated Average Glucose Result: 6.8 % (24 @ 04:13)

## 2024-12-18 NOTE — PROGRESS NOTE ADULT - SUBJECTIVE AND OBJECTIVE BOX
VITAL SIGNS-Telemetry:  SR   Vital Signs Last 24 Hrs  T(C): 37.4 (24 @ 21:30), Max: 37.4 (24 @ 21:30)  T(F): 99.4 (24 @ 21:30), Max: 99.4 (24 @ 21:30)  HR: 111 (24 @ 21:30) (73 - 132)  BP: 109/77 (24 @ 21:30) (109/77 - 170/101)  RR: 18 (24 @ 21:30) (16 - 18)  SpO2: 95% (24 @ 21:30) (95% - 100%)          @ 07:01  -   @ 06:08  --------------------------------------------------------  IN: 196 mL / OUT: 0 mL / NET: 196 mL    Daily     Daily Weight in k.4 (17 Dec 2024 21:30)    CAPILLARY BLOOD GLUCOSE  POCT Blood Glucose.: 150 mg/dL (18 Dec 2024 01:59)  POCT Blood Glucose.: 201 mg/dL (17 Dec 2024 22:09)  POCT Blood Glucose.: 154 mg/dL (17 Dec 2024 18:24)  POCT Blood Glucose.: 88 mg/dL (17 Dec 2024 12:03)  POCT Blood Glucose.: 134 mg/dL (17 Dec 2024 07:55)             PHYSICAL EXAM:  Neurology: alert and oriented x 3, nonfocal, no gross deficits  CV : S1S2  Lungs: cta  Abdomen: soft, nontender, nondistended, positive bowel sounds, last bowel movement         Extremities:     no c/c/e    aspirin enteric coated 81 milliGRAM(s) Oral daily  atorvastatin 80 milliGRAM(s) Oral at bedtime  heparin  Infusion 1000 Unit(s)/Hr IV Continuous <Continuous>  metoprolol tartrate 12.5 milliGRAM(s) Oral every 12 hours  sodium chloride 0.9% lock flush 3 milliLiter(s) IV Push every 8 hours      Physical Therapy Rec:   Home  [ x ]   Home w/ PT  [  ]  Rehab  [  ]  Discussed with Cardiothoracic Team at AM rounds.

## 2024-12-18 NOTE — PRE PROCEDURE NOTE - PRE PROCEDURE EVALUATION
Cardiac Surgery Pre-op Note:  CC: Patient is a 44y old  Male who presents with a chief complaint of sob (18 Dec 2024 06:07)    Referring Physician:                                                                                             Surgeon: dr. lorenz  Procedure: (Date) (Procedure) cabg    Allergies NKA    HPI:  44y Male with significant PMH of DM-1 (since age 20) with insulin pump in place and  on insulin pump, left frozen shoulder in the past and positive family h/o of heart disease. Presents to [facility] transferred from [facility]. Initially presented in ED with c/o left sided chest pain which started at about 10.00 am the day prior to presenting to the hospital. Patient had 4 episodes of chest pain lasting about 30 seconds The 4th episode of left sided chest pain at about 9.00-9.30 pm that night. Patient and wife called 911, and he was BIBA to [place] ED. He has received ASA in ambulance. At this time, he is chest pain free, and feeling better, but feels anxious and nervous. Patient says that he never has chest pain before. Otherwise, he denies SOB, palpitation, N/V, abd pain, diarrhea or dysuria. He also denies smoking, alcohol  or substance abuse. He says that his BS usually runs about 140, and his last A1c level was 6.4 about 2 months ago. In the ED Troponin trending up: 99.14->184.77, EK bpm in NSR and no acute ST-T changes. CXR negative for any acute cardiopulmonary abnormalities. 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 Transferred to [facility] CT Surgery Dr. Lorenz for CABG eval. Denies any current SOB or chest pains on admission. Otherwise denies abdominal pain, urinary or bowel changes, fevers, chills, N/V/D, sick contacts.    (17 Dec 2024 22:23)      PAST MEDICAL & SURGICAL HISTORY:  DM (diabetes mellitus)    Diabetes mellitus type 1  Insulin pump in place    MEDICATIONS  (STANDING):  acetaminophen     Tablet .. 1000 milliGRAM(s) Oral once  ascorbic acid 2000 milliGRAM(s) Oral once  aspirin enteric coated 81 milliGRAM(s) Oral daily  atorvastatin 80 milliGRAM(s) Oral at bedtime  cefuroxime  IVPB 1500 milliGRAM(s) IV Intermittent once  chlorhexidine 0.12% Liquid 15 milliLiter(s) Swish and Spit once  chlorhexidine 4% Liquid 1 Application(s) Topical once  gabapentin 300 milliGRAM(s) Oral once  heparin  Infusion 1000 Unit(s)/Hr (10 mL/Hr) IV Continuous <Continuous>  metoprolol tartrate 12.5 milliGRAM(s) Oral every 12 hours  sodium chloride 0.9% lock flush 3 milliLiter(s) IV Push every 8 hours    MEDICATIONS  (PRN):  ALPRAZolam 0.25 milliGRAM(s) Oral every 8 hours PRN anxiety    Labs:                        14.9   7.59  )-----------( 197      ( 18 Dec 2024 06:28 )             44.5     140  |  102  |  17  ----------------------------<  92  3.6   |  23  |  0.99    Ca    9.6      18 Dec 2024 06:28    TPro  6.4  /  Alb  4.0  /  TBili  0.9  /  DBili  x   /  AST  14  /  ALT  16  /  AlkPhos  52  12-18    PT/INR - ( 18 Dec 2024 00:09 )   PT: 10.9 sec;   INR: 0.95 ratio      PTT - ( 18 Dec 2024 06:28 )  PTT:56.8 sec  Blood Type: ABO Interpretation: A ( @ 00:05)  HGB A1C: A1C with Estimated Average Glucose (12.16.24 @ 04:13)    A1C with Estimated Average Glucose Result: 6.8: Method: Immunoassay       Reference Range                4.0-5.6%       High risk (prediabetic)        5.7-6.4%       Diabetic, diagnostic             >=6.5%       ADA diabetic treatment goal       <7.0%  The Hemoglobin A1c testing is NGSP-certified.Reference ranges are based  upon the 2010 recommendations of  the American Diabetes Association.  Interpretation may vary for children  and adolescents. %   Estimated Average Glucose: 148: The Estimated Average Glucose (eAG) or Mean Plasma Glucose (MPG) value is  calculated from the hemoglobin A1c value and covers the same time period.   The American Diabetes Association (ADA) and other professional  organizations recommend reporting the eAG with the HgbA1c. mg/dL    Thyroid Panel:   MRSA:  / MSSA:   Urinalysis Basic - ( 18 Dec 2024 06:28 )    Color: x / Appearance: x / SG: x / pH: x  Gluc: 92 mg/dL / Ketone: x  / Bili: x / Urobili: x   Blood: x / Protein: x / Nitrite: x   Leuk Esterase: x / RBC: x / WBC x   Sq Epi: x / Non Sq Epi: x / Bacteria: x    CXR: < from: Xray Chest 1 View- PORTABLE-Urgent (24 @ 01:24) >    AP chest.    Heart and mediastinum normal.  Lungs clear.  Costophrenic angles sharp   bilaterally.    IMPRESSION: Normal chest    EKG: < from: 12 Lead ECG (24 @ 00:09) >  Ventricular Rate 69 BPM    Atrial Rate 69 BPM    P-R Interval 130 ms    QRS Duration 92 ms    Q-T Interval 384 ms    QTC Calculation(Bazett) 411 ms    P Axis 60 degrees    R Axis 62 degrees    T Axis 64 degrees    Diagnosis Line Normal sinus rhythm  Normal ECG    Carotid Duplex:      PFT's:    Echocardiogram: < from: TTE W or WO Ultrasound Enhancing Agent (24 @ 10:24) >  CONCLUSIONS:      1. Left ventricular systolic function is normal with an ejection fraction of 54 % by Ratliff's method of disks.   2. Normal left ventricular diastolic function.   3. Normal right ventricular cavity size, with normal wall thickness, and normal right ventricular systolic function.   4. Trace mitral regurgitation.   5. Mild tricuspid regurgitation.    Cardiac catheterization: < from: Cardiac Catheterization (24 @ 13:51) >  Coronary Angiography   The coronary circulation is right dominant.      LM   Distal left main: There is a 30 % stenosis.      LAD   Left anterior descending artery: Diffuse heavily calcified 80% disease  in the ostial/proximal vessel with a 95% mid lesion..    CX   Circumflex: Angiography shows mild atherosclerosis.      RCA   Distal right coronary artery: There is an 80 % stenosis.      Gen: WN/WD NAD  Neuro: AAOx3, nonfocal  Pulm: CTA B/L  CV: RRR, S1S2  Abd: Soft, NT, ND +BS  Ext: No edema, + peripheral pulses    Pt has AICD/PPM [ ] Yes  [x ] No             Brand Name:  Pre-op Beta Blocker ordered within 24 hrs of surgery (CABG ONLY)?  [x ] Yes  [ ] No  If not, Why?  Type & Cross  [x ] Yes  [ ] No  NPO after Midnight [x ] Yes  [ ] No  Pre-op ABX ordered, to be taped on chart:  [ x] Yes  [ ] No     Hibiclens/Peridex ordered [x ] Yes  [ ] No  Intraop on Hold: PRBCs, CXR, OC [x ]   Consent obtained  [x ] Yes  [ ] No   Cardiac Surgery Pre-op Note:  CC: Patient is a 44y old  Male who presents with a chief complaint of sob (18 Dec 2024 06:07)    Referring Physician:                                                                                             Surgeon: dr. lorenz  Procedure: (Date) (Procedure) cabg    Allergies NKA    HPI:  44y Male with significant PMH of DM-1 (since age 20) with insulin pump in place and  on insulin pump, left frozen shoulder in the past and positive family h/o of heart disease. Presents to [facility] transferred from [facility]. Initially presented in ED with c/o left sided chest pain which started at about 10.00 am the day prior to presenting to the hospital. Patient had 4 episodes of chest pain lasting about 30 seconds The 4th episode of left sided chest pain at about 9.00-9.30 pm that night. Patient and wife called 911, and he was BIBA to [place] ED. He has received ASA in ambulance. At this time, he is chest pain free, and feeling better, but feels anxious and nervous. Patient says that he never has chest pain before. Otherwise, he denies SOB, palpitation, N/V, abd pain, diarrhea or dysuria. He also denies smoking, alcohol  or substance abuse. He says that his BS usually runs about 140, and his last A1c level was 6.4 about 2 months ago. In the ED Troponin trending up: 99.14->184.77, EK bpm in NSR and no acute ST-T changes. CXR negative for any acute cardiopulmonary abnormalities. 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 Transferred to [facility] CT Surgery Dr. Lorenz for CABG eval. Denies any current SOB or chest pains on admission. Otherwise denies abdominal pain, urinary or bowel changes, fevers, chills, N/V/D, sick contacts.    (17 Dec 2024 22:23)      PAST MEDICAL & SURGICAL HISTORY:  DM (diabetes mellitus)    Diabetes mellitus type 1  Insulin pump in place    MEDICATIONS  (STANDING):  acetaminophen     Tablet .. 1000 milliGRAM(s) Oral once  ascorbic acid 2000 milliGRAM(s) Oral once  aspirin enteric coated 81 milliGRAM(s) Oral daily  atorvastatin 80 milliGRAM(s) Oral at bedtime  cefuroxime  IVPB 1500 milliGRAM(s) IV Intermittent once  chlorhexidine 0.12% Liquid 15 milliLiter(s) Swish and Spit once  chlorhexidine 4% Liquid 1 Application(s) Topical once  gabapentin 300 milliGRAM(s) Oral once  heparin  Infusion 1000 Unit(s)/Hr (10 mL/Hr) IV Continuous <Continuous>  metoprolol tartrate 12.5 milliGRAM(s) Oral every 12 hours  sodium chloride 0.9% lock flush 3 milliLiter(s) IV Push every 8 hours    MEDICATIONS  (PRN):  ALPRAZolam 0.25 milliGRAM(s) Oral every 8 hours PRN anxiety    Labs:                        14.9   7.59  )-----------( 197      ( 18 Dec 2024 06:28 )             44.5     140  |  102  |  17  ----------------------------<  92  3.6   |  23  |  0.99    Ca    9.6      18 Dec 2024 06:28    TPro  6.4  /  Alb  4.0  /  TBili  0.9  /  DBili  x   /  AST  14  /  ALT  16  /  AlkPhos  52  12-18    PT/INR - ( 18 Dec 2024 00:09 )   PT: 10.9 sec;   INR: 0.95 ratio      PTT - ( 18 Dec 2024 06:28 )  PTT:56.8 sec  Blood Type: ABO Interpretation: A ( @ 00:05)  HGB A1C: A1C with Estimated Average Glucose (24 @ 04:13)    A1C with Estimated Average Glucose Result: 6.8: Method: Immunoassay       Reference Range                4.0-5.6%       High risk (prediabetic)        5.7-6.4%       Diabetic, diagnostic             >=6.5%       ADA diabetic treatment goal       <7.0%  The Hemoglobin A1c testing is NGSP-certified.Reference ranges are based  upon the 2010 recommendations of  the American Diabetes Association.  Interpretation may vary for children  and adolescents. %   Estimated Average Glucose: 148: The Estimated Average Glucose (eAG) or Mean Plasma Glucose (MPG) value is  calculated from the hemoglobin A1c value and covers the same time period.   The American Diabetes Association (ADA) and other professional  organizations recommend reporting the eAG with the HgbA1c. mg/dL    Thyroid Panel:     MRSA:  / MSSA: MRSA/MSSA PCR (24 @ 00:08)    MRSA PCR Result.: NotDetec: The results of this test should be interpreted with consideration of  clinical context.  Not Detected result indicates the absence of organisms or that the number  of organisms is below the assay limit of detection.  Detected result indicates the presence of organism nucleic acid.  Indeterminate result may indicate the presence of amplification  inhibitors in specimen; presence or absence of organisms cannot be  determined. Consider collecting new specimen if further testing is still  needed.  This qualitative PCR assay is FDA-approved, and its performance was  established by [facility], [place], [place].   Staph aureus PCR Result: [facility]    Urinalysis Basic - ( 18 Dec 2024 06:28 )    Color: x / Appearance: x / SG: x / pH: x  Gluc: 92 mg/dL / Ketone: x  / Bili: x / Urobili: x   Blood: x / Protein: x / Nitrite: x   Leuk Esterase: x / RBC: x / WBC x   Sq Epi: x / Non Sq Epi: x / Bacteria: x    CXR: < from: Xray Chest 1 View- PORTABLE-Urgent (24 @ 01:24) >    AP chest.    Heart and mediastinum normal.  Lungs clear.  Costophrenic angles sharp   bilaterally.    IMPRESSION: Normal chest    EKG: < from: 12 Lead ECG (24 @ 00:09) >  Ventricular Rate 69 BPM    Atrial Rate 69 BPM    P-R Interval 130 ms    QRS Duration 92 ms    Q-T Interval 384 ms    QTC Calculation(Bazett) 411 ms    P Axis 60 degrees    R Axis 62 degrees    T Axis 64 degrees    Diagnosis Line Normal sinus rhythm  Normal ECG    Carotid Duplex:  < from: VA Duplex Carotid, Bilat (24 @ 10:46) >  IMPRESSION: No significant hemodynamic stenosis of either carotid artery.    PFT's:    Echocardiogram: < from: TTE W or WO Ultrasound Enhancing Agent (24 @ 10:24) >  CONCLUSIONS:      1. Left ventricular systolic function is normal with an ejection fraction of 54 % by Ratliff's method of disks.   2. Normal left ventricular diastolic function.   3. Normal right ventricular cavity size, with normal wall thickness, and normal right ventricular systolic function.   4. Trace mitral regurgitation.   5. Mild tricuspid regurgitation.    Cardiac catheterization: < from: Cardiac Catheterization (24 @ 13:51) >  Coronary Angiography   The coronary circulation is right dominant.      LM   Distal left main: There is a 30 % stenosis.      LAD   Left anterior descending artery: Diffuse heavily calcified 80% disease  in the ostial/proximal vessel with a 95% mid lesion..    CX   Circumflex: Angiography shows mild atherosclerosis.      RCA   Distal right coronary artery: There is an 80 % stenosis.      Gen: WN/WD NAD  Neuro: AAOx3, nonfocal  Pulm: CTA B/L  CV: RRR, S1S2  Abd: Soft, NT, ND +BS  Ext: No edema, + peripheral pulses    Pt has AICD/PPM [ ] Yes  [x ] No             Brand Name:  Pre-op Beta Blocker ordered within 24 hrs of surgery (CABG ONLY)?  [x ] Yes  [ ] No  If not, Why?  Type & Cross  [x ] Yes  [ ] No  NPO after Midnight [x ] Yes  [ ] No  Pre-op ABX ordered, to be taped on chart:  [ x] Yes  [ ] No     Hibiclens/Peridex ordered [x ] Yes  [ ] No  Intraop on Hold: PRBCs, CXR, OC [x ]   Consent obtained  [x ] Yes  [ ] No

## 2024-12-18 NOTE — PATIENT PROFILE ADULT - FALL HARM RISK - PATIENT NEEDS ASSISTANCE
No assistance needed Patient requests all Lab, Cardiology, and Radiology Results on their Discharge Instructions

## 2024-12-19 ENCOUNTER — APPOINTMENT (OUTPATIENT)
Dept: CARDIOTHORACIC SURGERY | Facility: HOSPITAL | Age: 44
End: 2024-12-19

## 2024-12-19 ENCOUNTER — RESULT REVIEW (OUTPATIENT)
Age: 44
End: 2024-12-19

## 2024-12-19 ENCOUNTER — TRANSCRIPTION ENCOUNTER (OUTPATIENT)
Age: 44
End: 2024-12-19

## 2024-12-19 LAB
ALBUMIN SERPL ELPH-MCNC: 3.3 G/DL — SIGNIFICANT CHANGE UP (ref 3.3–5)
ALP SERPL-CCNC: 33 U/L — LOW (ref 40–120)
ALT FLD-CCNC: 14 U/L — SIGNIFICANT CHANGE UP (ref 10–45)
ANION GAP SERPL CALC-SCNC: 13 MMOL/L — SIGNIFICANT CHANGE UP (ref 5–17)
APTT BLD: 26.1 SEC — SIGNIFICANT CHANGE UP (ref 24.5–35.6)
AST SERPL-CCNC: 28 U/L — SIGNIFICANT CHANGE UP (ref 10–40)
BASOPHILS # BLD AUTO: 0 K/UL — SIGNIFICANT CHANGE UP (ref 0–0.2)
BASOPHILS NFR BLD AUTO: 0 % — SIGNIFICANT CHANGE UP (ref 0–2)
BILIRUB SERPL-MCNC: 1.5 MG/DL — HIGH (ref 0.2–1.2)
BUN SERPL-MCNC: 17 MG/DL — SIGNIFICANT CHANGE UP (ref 7–23)
CALCIUM SERPL-MCNC: 8.7 MG/DL — SIGNIFICANT CHANGE UP (ref 8.4–10.5)
CHLORIDE SERPL-SCNC: 103 MMOL/L — SIGNIFICANT CHANGE UP (ref 96–108)
CO2 SERPL-SCNC: 22 MMOL/L — SIGNIFICANT CHANGE UP (ref 22–31)
CREAT SERPL-MCNC: 0.89 MG/DL — SIGNIFICANT CHANGE UP (ref 0.5–1.3)
DACRYOCYTES BLD QL SMEAR: SLIGHT — SIGNIFICANT CHANGE UP
EGFR: 108 ML/MIN/1.73M2 — SIGNIFICANT CHANGE UP
EOSINOPHIL # BLD AUTO: 0 K/UL — SIGNIFICANT CHANGE UP (ref 0–0.5)
EOSINOPHIL NFR BLD AUTO: 0 % — SIGNIFICANT CHANGE UP (ref 0–6)
FIBRINOGEN PPP-MCNC: 193 MG/DL — LOW (ref 200–445)
GAS PNL BLDA: SIGNIFICANT CHANGE UP
GAS PNL BLDV: SIGNIFICANT CHANGE UP
GIANT PLATELETS BLD QL SMEAR: PRESENT — SIGNIFICANT CHANGE UP
GLUCOSE BLDC GLUCOMTR-MCNC: 106 MG/DL — HIGH (ref 70–99)
GLUCOSE BLDC GLUCOMTR-MCNC: 108 MG/DL — HIGH (ref 70–99)
GLUCOSE BLDC GLUCOMTR-MCNC: 121 MG/DL — HIGH (ref 70–99)
GLUCOSE BLDC GLUCOMTR-MCNC: 131 MG/DL — HIGH (ref 70–99)
GLUCOSE BLDC GLUCOMTR-MCNC: 134 MG/DL — HIGH (ref 70–99)
GLUCOSE BLDC GLUCOMTR-MCNC: 136 MG/DL — HIGH (ref 70–99)
GLUCOSE BLDC GLUCOMTR-MCNC: 140 MG/DL — HIGH (ref 70–99)
GLUCOSE BLDC GLUCOMTR-MCNC: 281 MG/DL — HIGH (ref 70–99)
GLUCOSE SERPL-MCNC: 153 MG/DL — HIGH (ref 70–99)
HCT VFR BLD CALC: 34.4 % — LOW (ref 39–50)
HGB BLD-MCNC: 12.1 G/DL — LOW (ref 13–17)
INR BLD: 1.18 RATIO — HIGH (ref 0.85–1.16)
LYMPHOCYTES # BLD AUTO: 0.6 K/UL — LOW (ref 1–3.3)
LYMPHOCYTES # BLD AUTO: 4.4 % — LOW (ref 13–44)
MAGNESIUM SERPL-MCNC: 1.8 MG/DL — SIGNIFICANT CHANGE UP (ref 1.6–2.6)
MANUAL SMEAR VERIFICATION: SIGNIFICANT CHANGE UP
MCHC RBC-ENTMCNC: 31.4 PG — SIGNIFICANT CHANGE UP (ref 27–34)
MCHC RBC-ENTMCNC: 35.2 G/DL — SIGNIFICANT CHANGE UP (ref 32–36)
MCV RBC AUTO: 89.4 FL — SIGNIFICANT CHANGE UP (ref 80–100)
MONOCYTES # BLD AUTO: 0.58 K/UL — SIGNIFICANT CHANGE UP (ref 0–0.9)
MONOCYTES NFR BLD AUTO: 4.3 % — SIGNIFICANT CHANGE UP (ref 2–14)
NEUTROPHILS # BLD AUTO: 12.41 K/UL — HIGH (ref 1.8–7.4)
NEUTROPHILS NFR BLD AUTO: 87 % — HIGH (ref 43–77)
NEUTS BAND # BLD: 4.3 % — SIGNIFICANT CHANGE UP (ref 0–8)
PHOSPHATE SERPL-MCNC: 1.8 MG/DL — LOW (ref 2.5–4.5)
PLAT MORPH BLD: NORMAL — SIGNIFICANT CHANGE UP
PLATELET # BLD AUTO: 117 K/UL — LOW (ref 150–400)
POIKILOCYTOSIS BLD QL AUTO: SLIGHT — SIGNIFICANT CHANGE UP
POTASSIUM SERPL-MCNC: 3.8 MMOL/L — SIGNIFICANT CHANGE UP (ref 3.5–5.3)
POTASSIUM SERPL-SCNC: 3.8 MMOL/L — SIGNIFICANT CHANGE UP (ref 3.5–5.3)
PROT SERPL-MCNC: 4.8 G/DL — LOW (ref 6–8.3)
PROTHROM AB SERPL-ACNC: 13.5 SEC — HIGH (ref 9.9–13.4)
RBC # BLD: 3.85 M/UL — LOW (ref 4.2–5.8)
RBC # FLD: 11.7 % — SIGNIFICANT CHANGE UP (ref 10.3–14.5)
RBC BLD AUTO: ABNORMAL
SMUDGE CELLS # BLD: PRESENT — SIGNIFICANT CHANGE UP
SODIUM SERPL-SCNC: 138 MMOL/L — SIGNIFICANT CHANGE UP (ref 135–145)
T3 SERPL-MCNC: 107 NG/DL — SIGNIFICANT CHANGE UP (ref 80–200)
T4 AB SER-ACNC: 7.4 UG/DL — SIGNIFICANT CHANGE UP (ref 4.6–12)
T4 FREE SERPL-MCNC: 1.5 NG/DL — SIGNIFICANT CHANGE UP (ref 0.9–1.7)
TSH SERPL-MCNC: 1.19 UIU/ML — SIGNIFICANT CHANGE UP (ref 0.27–4.2)
WBC # BLD: 13.59 K/UL — HIGH (ref 3.8–10.5)
WBC # FLD AUTO: 13.59 K/UL — HIGH (ref 3.8–10.5)

## 2024-12-19 PROCEDURE — 33534 CABG ARTERIAL TWO: CPT | Mod: AS

## 2024-12-19 PROCEDURE — 33534 CABG ARTERIAL TWO: CPT

## 2024-12-19 PROCEDURE — 71045 X-RAY EXAM CHEST 1 VIEW: CPT | Mod: 26

## 2024-12-19 PROCEDURE — 33508 ENDOSCOPIC VEIN HARVEST: CPT | Mod: 59

## 2024-12-19 PROCEDURE — 99291 CRITICAL CARE FIRST HOUR: CPT

## 2024-12-19 PROCEDURE — 33517 CABG ARTERY-VEIN SINGLE: CPT | Mod: AS

## 2024-12-19 PROCEDURE — 33517 CABG ARTERY-VEIN SINGLE: CPT

## 2024-12-19 PROCEDURE — 93010 ELECTROCARDIOGRAM REPORT: CPT

## 2024-12-19 DEVICE — PACING WIRE WHITE M-25 WINGED WIRE 37MM X 89MM: Type: IMPLANTABLE DEVICE | Status: FUNCTIONAL

## 2024-12-19 DEVICE — SURGICEL NU-KNIT 6 X 9": Type: IMPLANTABLE DEVICE | Status: FUNCTIONAL

## 2024-12-19 DEVICE — SURGICEL 2 X 14": Type: IMPLANTABLE DEVICE | Status: FUNCTIONAL

## 2024-12-19 DEVICE — CANNULA ARTERIAL CURVED TIP 22FR X 3/8": Type: IMPLANTABLE DEVICE | Status: FUNCTIONAL

## 2024-12-19 DEVICE — CANNULA VENOUS 3 STAGE STANDARD 29/37/37FR X 1/2" NON-VENTED: Type: IMPLANTABLE DEVICE | Status: FUNCTIONAL

## 2024-12-19 DEVICE — CLIP LIG TI SM/WIDE 24/BX: Type: IMPLANTABLE DEVICE | Status: FUNCTIONAL

## 2024-12-19 DEVICE — CANNULA ANTEGRADE W/VENT 12GA: Type: IMPLANTABLE DEVICE | Status: FUNCTIONAL

## 2024-12-19 DEVICE — KIT CVC 2LUM MAC 9FR CHG: Type: IMPLANTABLE DEVICE | Status: FUNCTIONAL

## 2024-12-19 DEVICE — SURGICEL FIBRILLAR 2 X 4": Type: IMPLANTABLE DEVICE | Status: FUNCTIONAL

## 2024-12-19 DEVICE — CANNULA ARTERIOTOMY 2MM X 1.3": Type: IMPLANTABLE DEVICE | Status: FUNCTIONAL

## 2024-12-19 DEVICE — LIGATING CLIPS WECK HORIZON MEDIUM (BLUE) 24: Type: IMPLANTABLE DEVICE | Status: FUNCTIONAL

## 2024-12-19 DEVICE — CANNULA VESSEL 3MM BLUNT TIP CLEAR 1-WAY VALVE: Type: IMPLANTABLE DEVICE | Status: FUNCTIONAL

## 2024-12-19 DEVICE — CHEST DRAIN THORACIC ARGYLE PVC 28FR STRAIGHT: Type: IMPLANTABLE DEVICE | Status: FUNCTIONAL

## 2024-12-19 DEVICE — KIT A-LINE 1LUM 20G X 12CM SAFE KIT: Type: IMPLANTABLE DEVICE | Status: FUNCTIONAL

## 2024-12-19 RX ORDER — POTASSIUM CHLORIDE 600 MG/1
10 TABLET, FILM COATED, EXTENDED RELEASE ORAL
Refills: 0 | Status: DISCONTINUED | OUTPATIENT
Start: 2024-12-19 | End: 2024-12-20

## 2024-12-19 RX ORDER — MAGNESIUM SULFATE 500 MG/ML
2 INJECTION, SOLUTION INTRAMUSCULAR; INTRAVENOUS ONCE
Refills: 0 | Status: COMPLETED | OUTPATIENT
Start: 2024-12-19 | End: 2024-12-19

## 2024-12-19 RX ORDER — SODIUM CHLORIDE 9 MG/ML
1000 INJECTION, SOLUTION INTRAMUSCULAR; INTRAVENOUS; SUBCUTANEOUS
Refills: 0 | Status: DISCONTINUED | OUTPATIENT
Start: 2024-12-19 | End: 2024-12-24

## 2024-12-19 RX ORDER — GABAPENTIN 300 MG/1
100 CAPSULE ORAL EVERY 8 HOURS
Refills: 0 | Status: COMPLETED | OUTPATIENT
Start: 2024-12-19 | End: 2024-12-24

## 2024-12-19 RX ORDER — DEXTROSE MONOHYDRATE 25 G/50ML
50 INJECTION, SOLUTION INTRAVENOUS
Refills: 0 | Status: DISCONTINUED | OUTPATIENT
Start: 2024-12-19 | End: 2024-12-24

## 2024-12-19 RX ORDER — ACETAMINOPHEN 80 MG/.8ML
1000 SOLUTION/ DROPS ORAL ONCE
Refills: 0 | Status: COMPLETED | OUTPATIENT
Start: 2024-12-19 | End: 2024-12-19

## 2024-12-19 RX ORDER — BISACODYL 5 MG
10 TABLET, DELAYED RELEASE (ENTERIC COATED) ORAL ONCE
Refills: 0 | Status: DISCONTINUED | OUTPATIENT
Start: 2024-12-21 | End: 2024-12-22

## 2024-12-19 RX ORDER — POTASSIUM PHOSPHATE, MONOBASIC AND POTASSIUM PHOSPHATE, DIBASIC 224; 236 MG/ML; MG/ML
15 INJECTION, SOLUTION INTRAVENOUS ONCE
Refills: 0 | Status: COMPLETED | OUTPATIENT
Start: 2024-12-19 | End: 2024-12-19

## 2024-12-19 RX ORDER — CHLORHEXIDINE GLUCONATE 1.2 MG/ML
1 RINSE ORAL DAILY
Refills: 0 | Status: DISCONTINUED | OUTPATIENT
Start: 2024-12-19 | End: 2024-12-24

## 2024-12-19 RX ORDER — POTASSIUM CHLORIDE 600 MG/1
10 TABLET, FILM COATED, EXTENDED RELEASE ORAL
Refills: 0 | Status: COMPLETED | OUTPATIENT
Start: 2024-12-19 | End: 2024-12-19

## 2024-12-19 RX ORDER — INSULIN HUMAN 100 [IU]/ML
3 INJECTION, SOLUTION SUBCUTANEOUS
Qty: 100 | Refills: 0 | Status: DISCONTINUED | OUTPATIENT
Start: 2024-12-19 | End: 2024-12-21

## 2024-12-19 RX ORDER — DEXMEDETOMIDINE HYDROCHLORIDE 4 UG/ML
0.5 INJECTION, SOLUTION INTRAVENOUS
Qty: 200 | Refills: 0 | Status: DISCONTINUED | OUTPATIENT
Start: 2024-12-19 | End: 2024-12-20

## 2024-12-19 RX ORDER — SENNOSIDES 8.6 MG/1
2 TABLET, FILM COATED ORAL AT BEDTIME
Refills: 0 | Status: DISCONTINUED | OUTPATIENT
Start: 2024-12-20 | End: 2024-12-21

## 2024-12-19 RX ORDER — AMIODARONE HYDROCHLORIDE 200 MG/1
400 TABLET ORAL
Refills: 0 | Status: COMPLETED | OUTPATIENT
Start: 2024-12-19 | End: 2024-12-22

## 2024-12-19 RX ORDER — DEXTROSE MONOHYDRATE 25 G/50ML
25 INJECTION, SOLUTION INTRAVENOUS
Refills: 0 | Status: DISCONTINUED | OUTPATIENT
Start: 2024-12-19 | End: 2024-12-24

## 2024-12-19 RX ORDER — CHLORHEXIDINE GLUCONATE 1.2 MG/ML
15 RINSE ORAL EVERY 12 HOURS
Refills: 0 | Status: DISCONTINUED | OUTPATIENT
Start: 2024-12-19 | End: 2024-12-20

## 2024-12-19 RX ORDER — ASCORBIC ACID 1000 MG
500 TABLET ORAL
Refills: 0 | Status: COMPLETED | OUTPATIENT
Start: 2024-12-19 | End: 2024-12-24

## 2024-12-19 RX ORDER — OXYCODONE HCL 15 MG
10 TABLET ORAL EVERY 4 HOURS
Refills: 0 | Status: DISCONTINUED | OUTPATIENT
Start: 2024-12-19 | End: 2024-12-24

## 2024-12-19 RX ORDER — CHLORHEXIDINE GLUCONATE 1.2 MG/ML
15 RINSE ORAL EVERY 12 HOURS
Refills: 0 | Status: DISCONTINUED | OUTPATIENT
Start: 2024-12-19 | End: 2024-12-19

## 2024-12-19 RX ORDER — NICARDIPINE HYDROCHLORIDE 2.5 MG/ML
5 INJECTION INTRAVENOUS
Qty: 40 | Refills: 0 | Status: DISCONTINUED | OUTPATIENT
Start: 2024-12-19 | End: 2024-12-20

## 2024-12-19 RX ORDER — MEPERIDINE HYDROCHLORIDE 50 MG/ML
25 INJECTION, SOLUTION INTRAMUSCULAR; INTRAVENOUS; SUBCUTANEOUS ONCE
Refills: 0 | Status: DISCONTINUED | OUTPATIENT
Start: 2024-12-19 | End: 2024-12-20

## 2024-12-19 RX ORDER — ACETAMINOPHEN 80 MG/.8ML
650 SOLUTION/ DROPS ORAL EVERY 6 HOURS
Refills: 0 | Status: DISCONTINUED | OUTPATIENT
Start: 2024-12-22 | End: 2024-12-24

## 2024-12-19 RX ORDER — POLYETHYLENE GLYCOL 3350 17 G/DOSE
17 POWDER (GRAM) ORAL DAILY
Refills: 0 | Status: DISCONTINUED | OUTPATIENT
Start: 2024-12-20 | End: 2024-12-20

## 2024-12-19 RX ORDER — ASPIRIN 81 MG
81 TABLET, DELAYED RELEASE (ENTERIC COATED) ORAL DAILY
Refills: 0 | Status: DISCONTINUED | OUTPATIENT
Start: 2024-12-19 | End: 2024-12-24

## 2024-12-19 RX ORDER — HYDROMORPHONE HCL 4 MG
0.5 TABLET ORAL EVERY 6 HOURS
Refills: 0 | Status: DISCONTINUED | OUTPATIENT
Start: 2024-12-19 | End: 2024-12-19

## 2024-12-19 RX ORDER — OXYCODONE HCL 15 MG
5 TABLET ORAL EVERY 4 HOURS
Refills: 0 | Status: DISCONTINUED | OUTPATIENT
Start: 2024-12-19 | End: 2024-12-24

## 2024-12-19 RX ORDER — PANTOPRAZOLE 40 MG/1
40 TABLET, DELAYED RELEASE ORAL DAILY
Refills: 0 | Status: DISCONTINUED | OUTPATIENT
Start: 2024-12-19 | End: 2024-12-20

## 2024-12-19 RX ORDER — ACETAMINOPHEN 80 MG/.8ML
650 SOLUTION/ DROPS ORAL EVERY 6 HOURS
Refills: 0 | Status: COMPLETED | OUTPATIENT
Start: 2024-12-19 | End: 2024-12-22

## 2024-12-19 RX ORDER — METOCLOPRAMIDE 10 MG/1
10 TABLET ORAL EVERY 8 HOURS
Refills: 0 | Status: COMPLETED | OUTPATIENT
Start: 2024-12-19 | End: 2024-12-21

## 2024-12-19 RX ORDER — ASPIRIN 81 MG
300 TABLET, DELAYED RELEASE (ENTERIC COATED) ORAL ONCE
Refills: 0 | Status: DISCONTINUED | OUTPATIENT
Start: 2024-12-19 | End: 2024-12-21

## 2024-12-19 RX ORDER — CEFUROXIME AXETIL 250 MG
1500 TABLET ORAL EVERY 8 HOURS
Refills: 0 | Status: COMPLETED | OUTPATIENT
Start: 2024-12-19 | End: 2024-12-21

## 2024-12-19 RX ADMIN — Medication 81 MILLIGRAM(S): at 21:22

## 2024-12-19 RX ADMIN — MAGNESIUM SULFATE 25 GRAM(S): 500 INJECTION, SOLUTION INTRAMUSCULAR; INTRAVENOUS at 15:09

## 2024-12-19 RX ADMIN — ACETAMINOPHEN 400 MILLIGRAM(S): 80 SOLUTION/ DROPS ORAL at 22:55

## 2024-12-19 RX ADMIN — POTASSIUM CHLORIDE 100 MILLIEQUIVALENT(S): 600 TABLET, FILM COATED, EXTENDED RELEASE ORAL at 14:00

## 2024-12-19 RX ADMIN — POTASSIUM CHLORIDE 100 MILLIEQUIVALENT(S): 600 TABLET, FILM COATED, EXTENDED RELEASE ORAL at 14:45

## 2024-12-19 RX ADMIN — Medication 12.5 MILLIGRAM(S): at 05:00

## 2024-12-19 RX ADMIN — PANTOPRAZOLE 40 MILLIGRAM(S): 40 TABLET, DELAYED RELEASE ORAL at 20:23

## 2024-12-19 RX ADMIN — POTASSIUM PHOSPHATE, MONOBASIC AND POTASSIUM PHOSPHATE, DIBASIC 62.5 MILLIMOLE(S): 224; 236 INJECTION, SOLUTION INTRAVENOUS at 15:18

## 2024-12-19 RX ADMIN — NICARDIPINE HYDROCHLORIDE 25 MG/HR: 2.5 INJECTION INTRAVENOUS at 14:49

## 2024-12-19 RX ADMIN — Medication 0.5 MILLIGRAM(S): at 23:30

## 2024-12-19 RX ADMIN — Medication 0.5 MILLIGRAM(S): at 18:03

## 2024-12-19 RX ADMIN — METOCLOPRAMIDE 10 MILLIGRAM(S): 10 TABLET ORAL at 21:22

## 2024-12-19 RX ADMIN — Medication 0.5 MILLIGRAM(S): at 17:48

## 2024-12-19 RX ADMIN — ACETAMINOPHEN 1000 MILLIGRAM(S): 80 SOLUTION/ DROPS ORAL at 04:59

## 2024-12-19 RX ADMIN — METOCLOPRAMIDE 10 MILLIGRAM(S): 10 TABLET ORAL at 14:38

## 2024-12-19 RX ADMIN — INSULIN HUMAN 3 UNIT(S)/HR: 100 INJECTION, SOLUTION SUBCUTANEOUS at 13:33

## 2024-12-19 RX ADMIN — ACETAMINOPHEN 1000 MILLIGRAM(S): 80 SOLUTION/ DROPS ORAL at 23:45

## 2024-12-19 RX ADMIN — Medication 100 MILLIGRAM(S): at 20:07

## 2024-12-19 RX ADMIN — SODIUM CHLORIDE 3 MILLILITER(S): 9 INJECTION, SOLUTION INTRAMUSCULAR; INTRAVENOUS; SUBCUTANEOUS at 05:00

## 2024-12-19 RX ADMIN — Medication 125 MILLILITER(S): at 15:24

## 2024-12-19 RX ADMIN — POTASSIUM CHLORIDE 100 MILLIEQUIVALENT(S): 600 TABLET, FILM COATED, EXTENDED RELEASE ORAL at 17:20

## 2024-12-19 RX ADMIN — ALPRAZOLAM 0.25 MILLIGRAM(S): 0.25 TABLET ORAL at 06:20

## 2024-12-19 RX ADMIN — SODIUM CHLORIDE 10 MILLILITER(S): 9 INJECTION, SOLUTION INTRAMUSCULAR; INTRAVENOUS; SUBCUTANEOUS at 13:35

## 2024-12-19 RX ADMIN — DEXMEDETOMIDINE HYDROCHLORIDE 8.43 MICROGRAM(S)/KG/HR: 4 INJECTION, SOLUTION INTRAVENOUS at 13:39

## 2024-12-19 RX ADMIN — Medication 125 MILLILITER(S): at 17:32

## 2024-12-19 RX ADMIN — GABAPENTIN 300 MILLIGRAM(S): 300 CAPSULE ORAL at 05:00

## 2024-12-19 RX ADMIN — Medication 0.5 MILLIGRAM(S): at 23:45

## 2024-12-19 NOTE — AIRWAY REMOVAL NOTE  ADULT & PEDS - ARTIFICAL AIRWAY REMOVAL COMMENTS
Written order for extubation verified. The patient was identified by full name and birth date compared to the identification band. Present during the procedure was Renetta SESAY

## 2024-12-19 NOTE — PROGRESS NOTE ADULT - ASSESSMENT
Assessment  DMT1: 44y Male with DM T1 with hyperglycemia, A1C 6.7% , was on  insulin pump at home, now for cabg surgery, uses omnipod pump with dexcom 7, humalog.  Insulin pump removed for surgery   Now postop on IV insulin   Will need tight glycemic control for postop wound healing.   CAD: on medications, stable, monitored. cagb postop   HTN: on antihypertensive medications, monitored, asymptomatic.      Discussed plan and management wit Dr Moo Cortés MD  Cell: [phone]  Office: [phone]             Assessment  DMT1: 44y Male with DM T1 with hyperglycemia, A1C 6.7% , was on  insulin pump at home, now for cabg surgery, uses omnipod pump with dexcom 7, humalog.  Insulin pump removed for surgery   Now postop on IV insulin   Will need tight glycemic control for postop wound healing.   CAD: on medications, stable, monitored. cagb postop   HTN: on antihypertensive medications, monitored, asymptomatic.         Discussed plan and management wit Dr Moo Cortés MD  Cell: [phone]  Office: [phone]

## 2024-12-19 NOTE — BRIEF OPERATIVE NOTE - COMMENTS
No unexpected foreign bodies were apparent on preliminary review of post-op x-ray. Reviewed by Dr. Ibrahim.   EBL not accurate due to use of cardiotomy and cell saver suction on CPB.

## 2024-12-19 NOTE — BRIEF OPERATIVE NOTE - NSICDXBRIEFPROCEDURE_GEN_ALL_CORE_FT
PROCEDURES:  CABG, with OC 19-Dec-2024 13:48:54 CABGx3  LIMA to LAD  Radial Artery with angela blas to PDA   SVG to Shanelle Hearn

## 2024-12-19 NOTE — PROGRESS NOTE ADULT - SUBJECTIVE AND OBJECTIVE BOX
Patient seen and examined at the bedside.    Remains critically ill on continuous ICU monitoring.      Brief Summary:  44y Male with significant PMH of DM-1 (since age 20) with insulin pump in place and  on insulin pump, left frozen shoulder in the past and positive family h/o of heart disease. Presents to [facility] transferred from [facility]. Initially presented in ED with c/o left sided chest pain which started at about 10.00 am the day prior to presenting to the hospital. Patient had 4 episodes of chest pain lasting about 30 seconds The 4th episode of left sided chest pain at about 9.00-9.30 pm that night. Patient and wife called 911, and he was BIBA to [place] ED. He has received ASA in ambulance.  CAD s/p CABG, with OC 12/19/24       24 Hour events:  S/P CABG, with OC 12/19/24   Extubated to Aerosal Mask  Off sedation, following commands     Objective:  Vital Signs Last 24 Hrs  T(C): 37 (19 Dec 2024 17:00), Max: 37 (19 Dec 2024 17:00)  T(F): 98.6 (19 Dec 2024 17:00), Max: 98.6 (19 Dec 2024 17:00)  HR: 104 (19 Dec 2024 17:15) (69 - 104)  BP: 128/78 (19 Dec 2024 06:35) (128/78 - 150/85)  BP(mean): 89 (19 Dec 2024 06:35) (89 - 89)  RR: 13 (19 Dec 2024 17:15) (12 - 63)  SpO2: 100% (19 Dec 2024 17:15) (96% - 100%)    Parameters below as of 19 Dec 2024 16:42  Patient On (Oxygen Delivery Method): mask, aerosol  O2 Flow (L/min): 10  O2 Concentration (%): 50    Mode: AC/ CMV (Assist Control/ Continuous Mandatory Ventilation)  RR (machine): 12  TV (machine): 500  FiO2: 50  PEEP: 5  ITime: 1  MAP: 9  PIP: 17              Physical Exam:   General: Alert and interactive   Neurology: Nonfocal, following commands  Respiratory: Clear bilaterally   CV: Sinus Tachy  Abdominal: Soft, Nontender  Extremities: Warm, well-perfused  -------------------------------------------------------------------------------------------------------------------------------    Labs:                        12.1   13.59 )-----------( 117      ( 19 Dec 2024 14:18 )             34.4     12-19    138  |  103  |  17  ----------------------------<  153[H]  3.8   |  22  |  0.89    Ca    8.7      19 Dec 2024 14:18  Phos  1.8     12-19  Mg     1.8     12-19    TPro  4.8[L]  /  Alb  3.3  /  TBili  1.5[H]  /  DBili  x   /  AST  28  /  ALT  14  /  AlkPhos  33[L]  12-19    LIVER FUNCTIONS - ( 19 Dec 2024 14:18 )  Alb: 3.3 g/dL / Pro: 4.8 g/dL / ALK PHOS: 33 U/L / ALT: 14 U/L / AST: 28 U/L / GGT: x           PT/INR - ( 19 Dec 2024 14:18 )   PT: 13.5 sec;   INR: 1.18 ratio         PTT - ( 19 Dec 2024 14:18 )  PTT:26.1 sec  ABG - ( 19 Dec 2024 17:13 )  pH, Arterial: 7.34  pH, Blood: x     /  pCO2: 47    /  pO2: 180   / HCO3: 25    / Base Excess: -0.8  /  SaO2: 99.0    ------------------------------------------------------------------------------------------------------------------------------  Assessment:  44y Male with significant PMH of DM-1 (since age 20) with insulin pump in place and  on insulin pump, left frozen shoulder in the past and positive family h/o of heart disease. Presents to [facility] transferred from [facility]. Initially presented in ED with c/o left sided chest pain which started at about 10.00 am the day prior to presenting to the hospital. Patient had 4 episodes of chest pain lasting about 30 seconds The 4th episode of left sided chest pain at about 9.00-9.30 pm that night. Patient and wife called 911, and he was BIBA to [place] ED. He has received ASA in ambulance.    CAD s/p CABG, with OC 12/19/24   Leukocytosis   Post-op respiratory insufficiency   Post-op acute blood loss anemia  Hemodynamic Instability     Plan:   ***Neuro***  Sedated with Precedex   Postoperative acute pain control with Tylenol, Gabapentin, Meperidine, and prns.    ***Cardiovascular***  At high risk for hemodynamic instability and cardiac arrhythmias.  PO Amiodarone for afib prophylaxis   Cardene for b/p control   ASA  Monitor chest tube output.      ***Pulmonary***  Postoperative acute respiratory insufficiency - Aerosol Mask   Deep breathing and coughing exercises.  Wean oxygen as able.      ***GI***  Regular Diet  Protonix for stress ulcer prophylaxis   Bowel regimen.    ***Renal***  Trend Creatinine   Conrad catheter for strict I/O measurements.   Replete electrolytes as indicated.      ***ID***  Trend wbc- elevated to 13.59  Perioperative coverage with Cefuroxime     ***Endocrine***  DM1- Insulin infusion.     ***Hematology***  Acute blood loss anemia and thrombocytopenia - no current transfusion indication      *** Lines ***      Care plan discussed with the ICU care team.   Patient remains critical, at risk for life threatening decompensation.    I have spent 40 minutes providing critical care management to this patient.    By signing my name below, I, Nahed Manuel, attest that this documentation has been prepared under the direction and in the presence of Tobi Fisher MD.  Electronically signed: Nahed Manuel, 12-19-24 @ 17:29    CHIKA, Tobi Fisher MD,  personally performed the services described in this documentation. all medical record entries made by the scribe were at my direction and in my presence. I have reviewed the chart and agree that the record reflects my personal performance and is accurate and complete  Electronically signed: Tobi Fisher MD. Patient seen and examined at the bedside.    Remains critically ill on continuous ICU monitoring.      Brief Summary:  44y Male with significant PMH of DM-1 (since age 20) with insulin pump in place and  on insulin pump, left frozen shoulder in the past and positive family h/o of heart disease. Presents to [facility] transferred from [facility]. Initially presented in ED with c/o left sided chest pain which started at about 10.00 am the day prior to presenting to the hospital. Patient had 4 episodes of chest pain lasting about 30 seconds The 4th episode of left sided chest pain at about 9.00-9.30 pm that night. Patient and wife called 911, and he was BIBA to [place] ED. He has received ASA in ambulance.  CAD s/p CABG, with OC 12/19/24       24 Hour events:  S/P CABG, with OC 12/19/24   Extubated to Aerosol Mask  Off sedation, following commands     Objective:  Vital Signs Last 24 Hrs  T(C): 37 (19 Dec 2024 17:00), Max: 37 (19 Dec 2024 17:00)  T(F): 98.6 (19 Dec 2024 17:00), Max: 98.6 (19 Dec 2024 17:00)  HR: 104 (19 Dec 2024 17:15) (69 - 104)  BP: 128/78 (19 Dec 2024 06:35) (128/78 - 150/85)  BP(mean): 89 (19 Dec 2024 06:35) (89 - 89)  RR: 13 (19 Dec 2024 17:15) (12 - 63)  SpO2: 100% (19 Dec 2024 17:15) (96% - 100%)    Parameters below as of 19 Dec 2024 16:42  Patient On (Oxygen Delivery Method): mask, aerosol  O2 Flow (L/min): 10  O2 Concentration (%): 50    Mode: AC/ CMV (Assist Control/ Continuous Mandatory Ventilation)  RR (machine): 12  TV (machine): 500  FiO2: 50  PEEP: 5  ITime: 1  MAP: 9  PIP: 17              Physical Exam:   General: Alert and interactive   Neurology: Nonfocal, following commands  Respiratory: Clear bilaterally   CV: Sinus Tachy  Abdominal: Soft, Nontender  Extremities: Warm, well-perfused  -------------------------------------------------------------------------------------------------------------------------------    Labs:                        12.1   13.59 )-----------( 117      ( 19 Dec 2024 14:18 )             34.4     12-19    138  |  103  |  17  ----------------------------<  153[H]  3.8   |  22  |  0.89    Ca    8.7      19 Dec 2024 14:18  Phos  1.8     12-19  Mg     1.8     12-19    TPro  4.8[L]  /  Alb  3.3  /  TBili  1.5[H]  /  DBili  x   /  AST  28  /  ALT  14  /  AlkPhos  33[L]  12-19    LIVER FUNCTIONS - ( 19 Dec 2024 14:18 )  Alb: 3.3 g/dL / Pro: 4.8 g/dL / ALK PHOS: 33 U/L / ALT: 14 U/L / AST: 28 U/L / GGT: x           PT/INR - ( 19 Dec 2024 14:18 )   PT: 13.5 sec;   INR: 1.18 ratio         PTT - ( 19 Dec 2024 14:18 )  PTT:26.1 sec  ABG - ( 19 Dec 2024 17:13 )  pH, Arterial: 7.34  pH, Blood: x     /  pCO2: 47    /  pO2: 180   / HCO3: 25    / Base Excess: -0.8  /  SaO2: 99.0    ------------------------------------------------------------------------------------------------------------------------------  Assessment:  44y Male with significant PMH of DM-1 (since age 20) with insulin pump in place and  on insulin pump, left frozen shoulder in the past and positive family h/o of heart disease. Presents to [facility] transferred from [facility]. Initially presented in ED with c/o left sided chest pain which started at about 10.00 am the day prior to presenting to the hospital. Patient had 4 episodes of chest pain lasting about 30 seconds The 4th episode of left sided chest pain at about 9.00-9.30 pm that night. Patient and wife called 911, and he was BIBA to [place] ED. He has received ASA in ambulance.    CAD s/p CABG, with OC 12/19/24   Leukocytosis   Post-op respiratory insufficiency   Post-op acute blood loss anemia  Hemodynamic Instability     Plan:   ***Neuro***  Sedated with Precedex   Postoperative acute pain control with Tylenol, Gabapentin, Meperidine, and prns.    ***Cardiovascular***  At high risk for hemodynamic instability and cardiac arrhythmias.  PO Amiodarone for afib prophylaxis   Cardene for b/p control   ASA  Monitor chest tube output.      ***Pulmonary***  Postoperative acute respiratory insufficiency - Aerosol Mask   Deep breathing and coughing exercises.  Wean oxygen as able.      ***GI***  Regular Diet  Protonix for stress ulcer prophylaxis   Bowel regimen.    ***Renal***  Trend Creatinine   Conrad catheter for strict I/O measurements.   Replete electrolytes as indicated.      ***ID***  Trend wbc- elevated to 13.59  Perioperative coverage with Cefuroxime     ***Endocrine***  DM1- Insulin infusion.     ***Hematology***  Acute blood loss anemia and thrombocytopenia - no current transfusion indication      *** Lines ***  RIJ Intro, RRAL    Care plan discussed with the ICU care team.   Patient remains critical, at risk for life threatening decompensation.    I have spent 40 minutes providing critical care management to this patient.    By signing my name below, I, Nahed Manuel, attest that this documentation has been prepared under the direction and in the presence of Tobi Fisher MD.  Electronically signed: Nahed Manuel, 12-19-24 @ 17:29    CHIKA, Tobi Fisher MD,  personally performed the services described in this documentation. all medical record entries made by the scribe were at my direction and in my presence. I have reviewed the chart and agree that the record reflects my personal performance and is accurate and complete  Electronically signed: Tobi Fisher MD. Patient seen and examined at the bedside.    Remains critically ill on continuous ICU monitoring.      Brief Summary:  44y Male with significant PMH of DM-1 (since age 20) with insulin pump in place and  on insulin pump, left frozen shoulder in the past and positive family h/o of heart disease. Presents to [facility] transferred from [facility]. Initially presented in ED with c/o left sided chest pain which started at about 10.00 am the day prior to presenting to the hospital. Patient had 4 episodes of chest pain lasting about 30 seconds The 4th episode of left sided chest pain at about 9.00-9.30 pm that night. Patient and wife called 911, and he was BIBA to [facility] ED. He has received ASA in ambulance.  CAD s/p CABG, with OC 12/19/24       24 Hour events:  S/P CABG 12/19/24   Extubated to Aerosol Mask  Off sedation, following commands     Objective:  Vital Signs Last 24 Hrs  T(C): 37 (19 Dec 2024 17:00), Max: 37 (19 Dec 2024 17:00)  T(F): 98.6 (19 Dec 2024 17:00), Max: 98.6 (19 Dec 2024 17:00)  HR: 104 (19 Dec 2024 17:15) (69 - 104)  BP: 128/78 (19 Dec 2024 06:35) (128/78 - 150/85)  BP(mean): 89 (19 Dec 2024 06:35) (89 - 89)  RR: 13 (19 Dec 2024 17:15) (12 - 63)  SpO2: 100% (19 Dec 2024 17:15) (96% - 100%)    Parameters below as of 19 Dec 2024 16:42  Patient On (Oxygen Delivery Method): mask, aerosol  O2 Flow (L/min): 10  O2 Concentration (%): 50    Mode: AC/ CMV (Assist Control/ Continuous Mandatory Ventilation)  RR (machine): 12  TV (machine): 500  FiO2: 50  PEEP: 5  ITime: 1  MAP: 9  PIP: 17              Physical Exam:   General: Alert and interactive   Neurology: Nonfocal, following commands  Respiratory: Clear bilaterally   CV: Sinus Tachy  Abdominal: Soft, Nontender  Extremities: Warm, well-perfused, dopplerable pulses   -------------------------------------------------------------------------------------------------------------------------------    Labs:                        12.1   13.59 )-----------( 117      ( 19 Dec 2024 14:18 )             34.4     12-19    138  |  103  |  17  ----------------------------<  153[H]  3.8   |  22  |  0.89    Ca    8.7      19 Dec 2024 14:18  Phos  1.8     12-19  Mg     1.8     12-19    TPro  4.8[L]  /  Alb  3.3  /  TBili  1.5[H]  /  DBili  x   /  AST  28  /  ALT  14  /  AlkPhos  33[L]  12-19    LIVER FUNCTIONS - ( 19 Dec 2024 14:18 )  Alb: 3.3 g/dL / Pro: 4.8 g/dL / ALK PHOS: 33 U/L / ALT: 14 U/L / AST: 28 U/L / GGT: x           PT/INR - ( 19 Dec 2024 14:18 )   PT: 13.5 sec;   INR: 1.18 ratio         PTT - ( 19 Dec 2024 14:18 )  PTT:26.1 sec  ABG - ( 19 Dec 2024 17:13 )  pH, Arterial: 7.34  pH, Blood: x     /  pCO2: 47    /  pO2: 180   / HCO3: 25    / Base Excess: -0.8  /  SaO2: 99.0    ------------------------------------------------------------------------------------------------------------------------------  Assessment:  44y Male with significant PMH of DM-1 (since age 20) with insulin pump in place and  on insulin pump, left frozen shoulder in the past and positive family h/o of heart disease. Presents to [facility] transferred from [facility]. Initially presented in ED with c/o left sided chest pain which started at about 10.00 am the day prior to presenting to the hospital. Patient had 4 episodes of chest pain lasting about 30 seconds The 4th episode of left sided chest pain at about 9.00-9.30 pm that night. Patient and wife called 911, and he was BIBA to [facility]. He has received ASA in ambulance.    CAD s/p CABG, with OC 12/19/24   Leukocytosis   Post-op respiratory insufficiency   Post-op acute blood loss anemia  Hemodynamic Instability   Insulin dependent diabetes  Stress hyperglycemia  Thrombocytopenia    Plan:   ***Neuro***  Postoperative acute pain control with Tylenol, Gabapentin, Meperidine, and prns.    ***Cardiovascular***  At high risk for hemodynamic instability and cardiac arrhythmias.  PO Amiodarone for afib prophylaxis   Cardene for b/p control   ASA  Monitor chest tube output.    ***Pulmonary***  Postoperative acute respiratory insufficiency - Aerosol Mask   Deep breathing and coughing exercises.  Wean oxygen as able.    ***GI***  Regular Diet  Protonix for stress ulcer prophylaxis   Bowel regimen.    ***Renal***  Trend Creatinine   Conrad catheter for strict I/O measurements.   Replete electrolytes as indicated.    ***ID***  Trend wbc  Perioperative coverage with Cefuroxime     ***Endocrine***  DM1- Insulin infusion.   stress hyperglycemia   HbA1c 6.7    ***Hematology***  Acute blood loss anemia and thrombocytopenia - no current transfusion indication    *** Lines ***  RIJ Intro, RRAL    Care plan discussed with the ICU care team.   Patient remains critical, at risk for life threatening decompensation.    I have spent 40 minutes providing critical care management to this patient.    By signing my name below, I, Nahed Manuel, attest that this documentation has been prepared under the direction and in the presence of Tobi Fisher MD.  Electronically signed: Nahed Manuel, 12-19-24 @ 17:29    CHIKA, Tobi Fisher MD,  personally performed the services described in this documentation. all medical record entries made by the scribe were at my direction and in my presence. I have reviewed the chart and agree that the record reflects my personal performance and is accurate and complete  Electronically signed: Tobi Fisher MD.

## 2024-12-19 NOTE — PROGRESS NOTE ADULT - PROBLEM SELECTOR PLAN 1
Will continue current insulin regimen for now.   IV insulin - pls maintain low dose IV insulin overnight as pt is TD1  Will continue monitoring FS, log, and glucose trends, will Follow up.

## 2024-12-19 NOTE — PROGRESS NOTE ADULT - SUBJECTIVE AND OBJECTIVE BOX
Chief complaint  Patient is a 44y old  Male who presents with a chief complaint of sob (18 Dec 2024 06:07)         Labs and Fingersticks  CAPILLARY BLOOD GLUCOSE      POCT Blood Glucose.: 108 mg/dL (19 Dec 2024 15:56)  POCT Blood Glucose.: 281 mg/dL (19 Dec 2024 01:47)  POCT Blood Glucose.: 153 mg/dL (18 Dec 2024 21:49)      Anion Gap: 13 (12-19 @ 14:18)  Anion Gap: 15 (12-18 @ 06:28)  Anion Gap: 15 (12-18 @ 00:09)      Calcium: 8.7 (12-19 @ 14:18)  Calcium: 9.6 (12-18 @ 06:28)  Calcium: 9.9 (12-18 @ 00:09)  Albumin: 3.3 (12-19 @ 14:18)  Albumin: 4.0 (12-18 @ 06:28)  Albumin: 4.3 (12-18 @ 00:09)    Alanine Aminotransferase (ALT/SGPT): 14 (12-19 @ 14:18)  Alanine Aminotransferase (ALT/SGPT): 16 (12-18 @ 06:28)  Alanine Aminotransferase (ALT/SGPT): 19 (12-18 @ 00:09)  Alkaline Phosphatase: 33 *L* (12-19 @ 14:18)  Alkaline Phosphatase: 52 (12-18 @ 06:28)  Alkaline Phosphatase: 58 (12-18 @ 00:09)  Aspartate Aminotransferase (AST/SGOT): 28 (12-19 @ 14:18)  Aspartate Aminotransferase (AST/SGOT): 14 (12-18 @ 06:28)  Aspartate Aminotransferase (AST/SGOT): 14 (12-18 @ 00:09)        12-19    138  |  103  |  17  ----------------------------<  153[H]  3.8   |  22  |  0.89    Ca    8.7      19 Dec 2024 14:18  Phos  1.8     12-19  Mg     1.8     12-19    TPro  4.8[L]  /  Alb  3.3  /  TBili  1.5[H]  /  DBili  x   /  AST  28  /  ALT  14  /  AlkPhos  33[L]  12-19                        12.1   13.59 )-----------( 117      ( 19 Dec 2024 14:18 )             34.4     Medications  MEDICATIONS  (STANDING):  acetaminophen     Tablet .. 650 milliGRAM(s) Oral every 6 hours  aMIOdarone    Tablet 400 milliGRAM(s) Oral two times a day  ascorbic acid 500 milliGRAM(s) Oral two times a day  aspirin enteric coated 81 milliGRAM(s) Oral daily  aspirin Suppository 300 milliGRAM(s) Rectal once  cefuroxime  IVPB 1500 milliGRAM(s) IV Intermittent every 8 hours  chlorhexidine 0.12% Liquid 15 milliLiter(s) Oral Mucosa every 12 hours  chlorhexidine 2% Cloths 1 Application(s) Topical daily  dexMEDEtomidine Infusion 0.5 MICROgram(s)/kG/Hr (8.43 mL/Hr) IV Continuous <Continuous>  dextrose 50% Injectable 50 milliLiter(s) IV Push every 15 minutes  dextrose 50% Injectable 25 milliLiter(s) IV Push every 15 minutes  gabapentin 100 milliGRAM(s) Oral every 8 hours  insulin regular Infusion 3 Unit(s)/Hr (3 mL/Hr) IV Continuous <Continuous>  meperidine     Injectable 25 milliGRAM(s) IV Push once  metoclopramide Injectable 10 milliGRAM(s) IV Push every 8 hours  niCARdipine Infusion 5 mG/Hr (25 mL/Hr) IV Continuous <Continuous>  pantoprazole  Injectable 40 milliGRAM(s) IV Push daily  potassium chloride  10 mEq/50 mL IVPB 10 milliEquivalent(s) IV Intermittent every 1 hour  potassium chloride  10 mEq/50 mL IVPB 10 milliEquivalent(s) IV Intermittent every 1 hour  potassium chloride  10 mEq/50 mL IVPB 10 milliEquivalent(s) IV Intermittent every 1 hour  potassium chloride  10 mEq/50 mL IVPB 10 milliEquivalent(s) IV Intermittent every 1 hour  sodium chloride 0.9%. 1000 milliLiter(s) (10 mL/Hr) IV Continuous <Continuous>      Physical Exam  General: Patient comfortable in bed   Vital Signs Last 12 Hrs  T(F): 98.2 (12-19-24 @ 16:00), Max: 98.2 (12-19-24 @ 16:00)  HR: 95 (12-19-24 @ 16:00) (69 - 95)  BP: 128/78 (12-19-24 @ 06:35) (128/78 - 128/78)  BP(mean): 89 (12-19-24 @ 06:35) (89 - 89)  RR: 19 (12-19-24 @ 16:00) (12 - 63)  SpO2: 100% (12-19-24 @ 16:00) (97% - 100%)    CVS: S1S2   Respiratory: No wheezing, no crepitations  GI: Abdomen soft, bowel sounds positive  Musculoskeletal:  moves all extremities         Chief complaint  Patient is a 44y old  Male who presents with a chief complaint of sob (18 Dec 2024 06:07)     Labs and Fingersticks  CAPILLARY BLOOD GLUCOSE      POCT Blood Glucose.: 108 mg/dL (19 Dec 2024 15:56)  POCT Blood Glucose.: 281 mg/dL (19 Dec 2024 01:47)  POCT Blood Glucose.: 153 mg/dL (18 Dec 2024 21:49)      Anion Gap: 13 (12-19 @ 14:18)  Anion Gap: 15 (12-18 @ 06:28)  Anion Gap: 15 (12-18 @ 00:09)      Calcium: 8.7 (12-19 @ 14:18)  Calcium: 9.6 (12-18 @ 06:28)  Calcium: 9.9 (12-18 @ 00:09)  Albumin: 3.3 (12-19 @ 14:18)  Albumin: 4.0 (12-18 @ 06:28)  Albumin: 4.3 (12-18 @ 00:09)    Alanine Aminotransferase (ALT/SGPT): 14 (12-19 @ 14:18)  Alanine Aminotransferase (ALT/SGPT): 16 (12-18 @ 06:28)  Alanine Aminotransferase (ALT/SGPT): 19 (12-18 @ 00:09)  Alkaline Phosphatase: 33 *L* (12-19 @ 14:18)  Alkaline Phosphatase: 52 (12-18 @ 06:28)  Alkaline Phosphatase: 58 (12-18 @ 00:09)  Aspartate Aminotransferase (AST/SGOT): 28 (12-19 @ 14:18)  Aspartate Aminotransferase (AST/SGOT): 14 (12-18 @ 06:28)  Aspartate Aminotransferase (AST/SGOT): 14 (12-18 @ 00:09)        12-19    138  |  103  |  17  ----------------------------<  153[H]  3.8   |  22  |  0.89    Ca    8.7      19 Dec 2024 14:18  Phos  1.8     12-19  Mg     1.8     12-19    TPro  4.8[L]  /  Alb  3.3  /  TBili  1.5[H]  /  DBili  x   /  AST  28  /  ALT  14  /  AlkPhos  33[L]  12-19                        12.1   13.59 )-----------( 117      ( 19 Dec 2024 14:18 )             34.4     Medications  MEDICATIONS  (STANDING):  acetaminophen     Tablet .. 650 milliGRAM(s) Oral every 6 hours  aMIOdarone    Tablet 400 milliGRAM(s) Oral two times a day  ascorbic acid 500 milliGRAM(s) Oral two times a day  aspirin enteric coated 81 milliGRAM(s) Oral daily  aspirin Suppository 300 milliGRAM(s) Rectal once  cefuroxime  IVPB 1500 milliGRAM(s) IV Intermittent every 8 hours  chlorhexidine 0.12% Liquid 15 milliLiter(s) Oral Mucosa every 12 hours  chlorhexidine 2% Cloths 1 Application(s) Topical daily  dexMEDEtomidine Infusion 0.5 MICROgram(s)/kG/Hr (8.43 mL/Hr) IV Continuous <Continuous>  dextrose 50% Injectable 50 milliLiter(s) IV Push every 15 minutes  dextrose 50% Injectable 25 milliLiter(s) IV Push every 15 minutes  gabapentin 100 milliGRAM(s) Oral every 8 hours  insulin regular Infusion 3 Unit(s)/Hr (3 mL/Hr) IV Continuous <Continuous>  meperidine     Injectable 25 milliGRAM(s) IV Push once  metoclopramide Injectable 10 milliGRAM(s) IV Push every 8 hours  niCARdipine Infusion 5 mG/Hr (25 mL/Hr) IV Continuous <Continuous>  pantoprazole  Injectable 40 milliGRAM(s) IV Push daily  potassium chloride  10 mEq/50 mL IVPB 10 milliEquivalent(s) IV Intermittent every 1 hour  potassium chloride  10 mEq/50 mL IVPB 10 milliEquivalent(s) IV Intermittent every 1 hour  potassium chloride  10 mEq/50 mL IVPB 10 milliEquivalent(s) IV Intermittent every 1 hour  potassium chloride  10 mEq/50 mL IVPB 10 milliEquivalent(s) IV Intermittent every 1 hour  sodium chloride 0.9%. 1000 milliLiter(s) (10 mL/Hr) IV Continuous <Continuous>      Physical Exam  General: Patient comfortable in bed   Vital Signs Last 12 Hrs  T(F): 98.2 (12-19-24 @ 16:00), Max: 98.2 (12-19-24 @ 16:00)  HR: 95 (12-19-24 @ 16:00) (69 - 95)  BP: 128/78 (12-19-24 @ 06:35) (128/78 - 128/78)  BP(mean): 89 (12-19-24 @ 06:35) (89 - 89)  RR: 19 (12-19-24 @ 16:00) (12 - 63)  SpO2: 100% (12-19-24 @ 16:00) (97% - 100%)    CVS: S1S2   Respiratory: No wheezing, no crepitations  GI: Abdomen soft, bowel sounds positive  Musculoskeletal:  moves all extremities

## 2024-12-20 LAB
ALBUMIN SERPL ELPH-MCNC: 4.2 G/DL — SIGNIFICANT CHANGE UP (ref 3.3–5)
ALBUMIN SERPL ELPH-MCNC: 4.3 G/DL — SIGNIFICANT CHANGE UP (ref 3.3–5)
ALP SERPL-CCNC: 27 U/L — LOW (ref 40–120)
ALP SERPL-CCNC: 32 U/L — LOW (ref 40–120)
ALT FLD-CCNC: 13 U/L — SIGNIFICANT CHANGE UP (ref 10–45)
ALT FLD-CCNC: 14 U/L — SIGNIFICANT CHANGE UP (ref 10–45)
ANION GAP SERPL CALC-SCNC: 10 MMOL/L — SIGNIFICANT CHANGE UP (ref 5–17)
ANION GAP SERPL CALC-SCNC: 13 MMOL/L — SIGNIFICANT CHANGE UP (ref 5–17)
APTT BLD: 25.4 SEC — SIGNIFICANT CHANGE UP (ref 24.5–35.6)
AST SERPL-CCNC: 26 U/L — SIGNIFICANT CHANGE UP (ref 10–40)
AST SERPL-CCNC: 28 U/L — SIGNIFICANT CHANGE UP (ref 10–40)
BASOPHILS # BLD AUTO: 0.01 K/UL — SIGNIFICANT CHANGE UP (ref 0–0.2)
BASOPHILS NFR BLD AUTO: 0.1 % — SIGNIFICANT CHANGE UP (ref 0–2)
BILIRUB SERPL-MCNC: 0.8 MG/DL — SIGNIFICANT CHANGE UP (ref 0.2–1.2)
BILIRUB SERPL-MCNC: 1 MG/DL — SIGNIFICANT CHANGE UP (ref 0.2–1.2)
BUN SERPL-MCNC: 15 MG/DL — SIGNIFICANT CHANGE UP (ref 7–23)
BUN SERPL-MCNC: 16 MG/DL — SIGNIFICANT CHANGE UP (ref 7–23)
CALCIUM SERPL-MCNC: 8.5 MG/DL — SIGNIFICANT CHANGE UP (ref 8.4–10.5)
CALCIUM SERPL-MCNC: 8.9 MG/DL — SIGNIFICANT CHANGE UP (ref 8.4–10.5)
CHLORIDE SERPL-SCNC: 102 MMOL/L — SIGNIFICANT CHANGE UP (ref 96–108)
CHLORIDE SERPL-SCNC: 103 MMOL/L — SIGNIFICANT CHANGE UP (ref 96–108)
CO2 SERPL-SCNC: 21 MMOL/L — LOW (ref 22–31)
CO2 SERPL-SCNC: 23 MMOL/L — SIGNIFICANT CHANGE UP (ref 22–31)
CREAT SERPL-MCNC: 0.83 MG/DL — SIGNIFICANT CHANGE UP (ref 0.5–1.3)
CREAT SERPL-MCNC: 0.96 MG/DL — SIGNIFICANT CHANGE UP (ref 0.5–1.3)
EGFR: 110 ML/MIN/1.73M2 — SIGNIFICANT CHANGE UP
EGFR: 99 ML/MIN/1.73M2 — SIGNIFICANT CHANGE UP
EOSINOPHIL # BLD AUTO: 0 K/UL — SIGNIFICANT CHANGE UP (ref 0–0.5)
EOSINOPHIL NFR BLD AUTO: 0 % — SIGNIFICANT CHANGE UP (ref 0–6)
GAS PNL BLDA: SIGNIFICANT CHANGE UP
GAS PNL BLDA: SIGNIFICANT CHANGE UP
GLUCOSE BLDC GLUCOMTR-MCNC: 103 MG/DL — HIGH (ref 70–99)
GLUCOSE BLDC GLUCOMTR-MCNC: 111 MG/DL — HIGH (ref 70–99)
GLUCOSE BLDC GLUCOMTR-MCNC: 120 MG/DL — HIGH (ref 70–99)
GLUCOSE BLDC GLUCOMTR-MCNC: 120 MG/DL — HIGH (ref 70–99)
GLUCOSE BLDC GLUCOMTR-MCNC: 124 MG/DL — HIGH (ref 70–99)
GLUCOSE BLDC GLUCOMTR-MCNC: 134 MG/DL — HIGH (ref 70–99)
GLUCOSE BLDC GLUCOMTR-MCNC: 140 MG/DL — HIGH (ref 70–99)
GLUCOSE BLDC GLUCOMTR-MCNC: 142 MG/DL — HIGH (ref 70–99)
GLUCOSE BLDC GLUCOMTR-MCNC: 145 MG/DL — HIGH (ref 70–99)
GLUCOSE BLDC GLUCOMTR-MCNC: 146 MG/DL — HIGH (ref 70–99)
GLUCOSE BLDC GLUCOMTR-MCNC: 147 MG/DL — HIGH (ref 70–99)
GLUCOSE BLDC GLUCOMTR-MCNC: 151 MG/DL — HIGH (ref 70–99)
GLUCOSE BLDC GLUCOMTR-MCNC: 158 MG/DL — HIGH (ref 70–99)
GLUCOSE BLDC GLUCOMTR-MCNC: 181 MG/DL — HIGH (ref 70–99)
GLUCOSE BLDC GLUCOMTR-MCNC: 215 MG/DL — HIGH (ref 70–99)
GLUCOSE BLDC GLUCOMTR-MCNC: 92 MG/DL — SIGNIFICANT CHANGE UP (ref 70–99)
GLUCOSE BLDC GLUCOMTR-MCNC: 95 MG/DL — SIGNIFICANT CHANGE UP (ref 70–99)
GLUCOSE BLDC GLUCOMTR-MCNC: 96 MG/DL — SIGNIFICANT CHANGE UP (ref 70–99)
GLUCOSE SERPL-MCNC: 126 MG/DL — HIGH (ref 70–99)
GLUCOSE SERPL-MCNC: 185 MG/DL — HIGH (ref 70–99)
HCT VFR BLD CALC: 30.3 % — LOW (ref 39–50)
HCT VFR BLD CALC: 32.4 % — LOW (ref 39–50)
HGB BLD-MCNC: 10.3 G/DL — LOW (ref 13–17)
HGB BLD-MCNC: 10.9 G/DL — LOW (ref 13–17)
IMM GRANULOCYTES NFR BLD AUTO: 0.4 % — SIGNIFICANT CHANGE UP (ref 0–0.9)
INR BLD: 1.11 RATIO — SIGNIFICANT CHANGE UP (ref 0.85–1.16)
LYMPHOCYTES # BLD AUTO: 0.79 K/UL — LOW (ref 1–3.3)
LYMPHOCYTES # BLD AUTO: 6.1 % — LOW (ref 13–44)
MAGNESIUM SERPL-MCNC: 1.9 MG/DL — SIGNIFICANT CHANGE UP (ref 1.6–2.6)
MCHC RBC-ENTMCNC: 31 PG — SIGNIFICANT CHANGE UP (ref 27–34)
MCHC RBC-ENTMCNC: 31.7 PG — SIGNIFICANT CHANGE UP (ref 27–34)
MCHC RBC-ENTMCNC: 33.6 G/DL — SIGNIFICANT CHANGE UP (ref 32–36)
MCHC RBC-ENTMCNC: 34 G/DL — SIGNIFICANT CHANGE UP (ref 32–36)
MCV RBC AUTO: 92 FL — SIGNIFICANT CHANGE UP (ref 80–100)
MCV RBC AUTO: 93.2 FL — SIGNIFICANT CHANGE UP (ref 80–100)
MONOCYTES # BLD AUTO: 0.79 K/UL — SIGNIFICANT CHANGE UP (ref 0–0.9)
MONOCYTES NFR BLD AUTO: 6.1 % — SIGNIFICANT CHANGE UP (ref 2–14)
NEUTROPHILS # BLD AUTO: 11.35 K/UL — HIGH (ref 1.8–7.4)
NEUTROPHILS NFR BLD AUTO: 87.3 % — HIGH (ref 43–77)
NRBC # BLD: 0 /100 WBCS — SIGNIFICANT CHANGE UP (ref 0–0)
NRBC # BLD: 0 /100 WBCS — SIGNIFICANT CHANGE UP (ref 0–0)
PHOSPHATE SERPL-MCNC: 3.8 MG/DL — SIGNIFICANT CHANGE UP (ref 2.5–4.5)
PLATELET # BLD AUTO: 123 K/UL — LOW (ref 150–400)
PLATELET # BLD AUTO: 125 K/UL — LOW (ref 150–400)
POTASSIUM SERPL-MCNC: 4.3 MMOL/L — SIGNIFICANT CHANGE UP (ref 3.5–5.3)
POTASSIUM SERPL-MCNC: 5 MMOL/L — SIGNIFICANT CHANGE UP (ref 3.5–5.3)
POTASSIUM SERPL-SCNC: 4.3 MMOL/L — SIGNIFICANT CHANGE UP (ref 3.5–5.3)
POTASSIUM SERPL-SCNC: 5 MMOL/L — SIGNIFICANT CHANGE UP (ref 3.5–5.3)
PROT SERPL-MCNC: 5.8 G/DL — LOW (ref 6–8.3)
PROT SERPL-MCNC: 5.9 G/DL — LOW (ref 6–8.3)
PROTHROM AB SERPL-ACNC: 12.7 SEC — SIGNIFICANT CHANGE UP (ref 9.9–13.4)
RBC # BLD: 3.25 M/UL — LOW (ref 4.2–5.8)
RBC # BLD: 3.52 M/UL — LOW (ref 4.2–5.8)
RBC # FLD: 11.9 % — SIGNIFICANT CHANGE UP (ref 10.3–14.5)
RBC # FLD: 12.4 % — SIGNIFICANT CHANGE UP (ref 10.3–14.5)
SODIUM SERPL-SCNC: 133 MMOL/L — LOW (ref 135–145)
SODIUM SERPL-SCNC: 139 MMOL/L — SIGNIFICANT CHANGE UP (ref 135–145)
WBC # BLD: 12.99 K/UL — HIGH (ref 3.8–10.5)
WBC # BLD: 17.94 K/UL — HIGH (ref 3.8–10.5)
WBC # FLD AUTO: 12.99 K/UL — HIGH (ref 3.8–10.5)
WBC # FLD AUTO: 17.94 K/UL — HIGH (ref 3.8–10.5)

## 2024-12-20 PROCEDURE — 99291 CRITICAL CARE FIRST HOUR: CPT

## 2024-12-20 PROCEDURE — 71045 X-RAY EXAM CHEST 1 VIEW: CPT | Mod: 26,76

## 2024-12-20 RX ORDER — INSULIN LISPRO 100/ML
VIAL (ML) SUBCUTANEOUS
Refills: 0 | Status: DISCONTINUED | OUTPATIENT
Start: 2024-12-20 | End: 2024-12-23

## 2024-12-20 RX ORDER — POLYETHYLENE GLYCOL 3350 17 G/DOSE
17 POWDER (GRAM) ORAL
Refills: 0 | Status: DISCONTINUED | OUTPATIENT
Start: 2024-12-20 | End: 2024-12-21

## 2024-12-20 RX ORDER — INSULIN LISPRO 100/ML
9 VIAL (ML) SUBCUTANEOUS
Refills: 0 | Status: DISCONTINUED | OUTPATIENT
Start: 2024-12-20 | End: 2024-12-21

## 2024-12-20 RX ORDER — SODIUM CHLORIDE 9 MG/ML
1000 INJECTION, SOLUTION INTRAVENOUS
Refills: 0 | Status: DISCONTINUED | OUTPATIENT
Start: 2024-12-20 | End: 2024-12-24

## 2024-12-20 RX ORDER — PANTOPRAZOLE 40 MG/1
40 TABLET, DELAYED RELEASE ORAL
Refills: 0 | Status: DISCONTINUED | OUTPATIENT
Start: 2024-12-20 | End: 2024-12-24

## 2024-12-20 RX ORDER — METOPROLOL TARTRATE 50 MG
12.5 TABLET ORAL EVERY 8 HOURS
Refills: 0 | Status: DISCONTINUED | OUTPATIENT
Start: 2024-12-20 | End: 2024-12-20

## 2024-12-20 RX ORDER — ALPRAZOLAM 0.25 MG/1
0.25 TABLET ORAL ONCE
Refills: 0 | Status: DISCONTINUED | OUTPATIENT
Start: 2024-12-20 | End: 2024-12-20

## 2024-12-20 RX ORDER — INSULIN GLARGINE-YFGN 100 [IU]/ML
30 INJECTION, SOLUTION SUBCUTANEOUS AT BEDTIME
Refills: 0 | Status: DISCONTINUED | OUTPATIENT
Start: 2024-12-20 | End: 2024-12-21

## 2024-12-20 RX ORDER — SODIUM CHLORIDE 9 MG/ML
1000 INJECTION, SOLUTION INTRAVENOUS
Refills: 0 | Status: DISCONTINUED | OUTPATIENT
Start: 2024-12-20 | End: 2024-12-21

## 2024-12-20 RX ORDER — DEXTROSE MONOHYDRATE 25 G/50ML
15 INJECTION, SOLUTION INTRAVENOUS ONCE
Refills: 0 | Status: DISCONTINUED | OUTPATIENT
Start: 2024-12-20 | End: 2024-12-24

## 2024-12-20 RX ORDER — DEXMEDETOMIDINE HYDROCHLORIDE 4 UG/ML
0.2 INJECTION, SOLUTION INTRAVENOUS
Qty: 200 | Refills: 0 | Status: DISCONTINUED | OUTPATIENT
Start: 2024-12-20 | End: 2024-12-21

## 2024-12-20 RX ORDER — GLUCAGON INJECTION, SOLUTION 0.5 MG/.1ML
1 INJECTION, SOLUTION SUBCUTANEOUS ONCE
Refills: 0 | Status: DISCONTINUED | OUTPATIENT
Start: 2024-12-20 | End: 2024-12-24

## 2024-12-20 RX ORDER — ENOXAPARIN SODIUM 60 MG/.6ML
40 INJECTION INTRAVENOUS; SUBCUTANEOUS EVERY 24 HOURS
Refills: 0 | Status: DISCONTINUED | OUTPATIENT
Start: 2024-12-20 | End: 2024-12-24

## 2024-12-20 RX ORDER — FUROSEMIDE 20 MG
20 TABLET ORAL ONCE
Refills: 0 | Status: DISCONTINUED | OUTPATIENT
Start: 2024-12-20 | End: 2024-12-20

## 2024-12-20 RX ORDER — METOPROLOL TARTRATE 50 MG
25 TABLET ORAL EVERY 8 HOURS
Refills: 0 | Status: DISCONTINUED | OUTPATIENT
Start: 2024-12-20 | End: 2024-12-22

## 2024-12-20 RX ORDER — COLCHICINE 0.6 MG/1
0.6 CAPSULE ORAL
Refills: 0 | Status: DISCONTINUED | OUTPATIENT
Start: 2024-12-20 | End: 2024-12-22

## 2024-12-20 RX ORDER — COLCHICINE 0.6 MG/1
0.6 CAPSULE ORAL EVERY 24 HOURS
Refills: 0 | Status: DISCONTINUED | OUTPATIENT
Start: 2024-12-20 | End: 2024-12-20

## 2024-12-20 RX ORDER — ATORVASTATIN CALCIUM 40 MG/1
80 TABLET, FILM COATED ORAL AT BEDTIME
Refills: 0 | Status: DISCONTINUED | OUTPATIENT
Start: 2024-12-20 | End: 2024-12-24

## 2024-12-20 RX ORDER — MAGNESIUM SULFATE 500 MG/ML
2 INJECTION, SOLUTION INTRAMUSCULAR; INTRAVENOUS ONCE
Refills: 0 | Status: COMPLETED | OUTPATIENT
Start: 2024-12-20 | End: 2024-12-20

## 2024-12-20 RX ORDER — NALOXEGOL OXALATE 12.5 MG/1
25 TABLET, FILM COATED ORAL ONCE
Refills: 0 | Status: COMPLETED | OUTPATIENT
Start: 2024-12-20 | End: 2024-12-20

## 2024-12-20 RX ADMIN — Medication 25 MILLIGRAM(S): at 05:26

## 2024-12-20 RX ADMIN — METOCLOPRAMIDE 10 MILLIGRAM(S): 10 TABLET ORAL at 21:09

## 2024-12-20 RX ADMIN — METOCLOPRAMIDE 10 MILLIGRAM(S): 10 TABLET ORAL at 13:29

## 2024-12-20 RX ADMIN — Medication 17 GRAM(S): at 12:26

## 2024-12-20 RX ADMIN — ACETAMINOPHEN 650 MILLIGRAM(S): 80 SOLUTION/ DROPS ORAL at 05:26

## 2024-12-20 RX ADMIN — Medication 17 GRAM(S): at 13:58

## 2024-12-20 RX ADMIN — AMIODARONE HYDROCHLORIDE 400 MILLIGRAM(S): 200 TABLET ORAL at 05:25

## 2024-12-20 RX ADMIN — Medication 81 MILLIGRAM(S): at 12:26

## 2024-12-20 RX ADMIN — COLCHICINE 0.6 MILLIGRAM(S): 0.6 CAPSULE ORAL at 17:45

## 2024-12-20 RX ADMIN — GABAPENTIN 100 MILLIGRAM(S): 300 CAPSULE ORAL at 05:26

## 2024-12-20 RX ADMIN — ALPRAZOLAM 0.25 MILLIGRAM(S): 0.25 TABLET ORAL at 18:02

## 2024-12-20 RX ADMIN — DEXMEDETOMIDINE HYDROCHLORIDE 3.37 MICROGRAM(S)/KG/HR: 4 INJECTION, SOLUTION INTRAVENOUS at 19:29

## 2024-12-20 RX ADMIN — Medication 500 MILLIGRAM(S): at 17:44

## 2024-12-20 RX ADMIN — Medication 1: at 21:57

## 2024-12-20 RX ADMIN — NALOXEGOL OXALATE 25 MILLIGRAM(S): 12.5 TABLET, FILM COATED ORAL at 13:58

## 2024-12-20 RX ADMIN — INSULIN GLARGINE-YFGN 30 UNIT(S): 100 INJECTION, SOLUTION SUBCUTANEOUS at 21:57

## 2024-12-20 RX ADMIN — GABAPENTIN 100 MILLIGRAM(S): 300 CAPSULE ORAL at 21:09

## 2024-12-20 RX ADMIN — ACETAMINOPHEN 650 MILLIGRAM(S): 80 SOLUTION/ DROPS ORAL at 13:00

## 2024-12-20 RX ADMIN — MAGNESIUM SULFATE 25 GRAM(S): 500 INJECTION, SOLUTION INTRAMUSCULAR; INTRAVENOUS at 03:35

## 2024-12-20 RX ADMIN — CHLORHEXIDINE GLUCONATE 1 APPLICATION(S): 1.2 RINSE ORAL at 16:50

## 2024-12-20 RX ADMIN — SENNOSIDES 2 TABLET(S): 8.6 TABLET, FILM COATED ORAL at 21:09

## 2024-12-20 RX ADMIN — Medication 100 MILLIGRAM(S): at 20:01

## 2024-12-20 RX ADMIN — ENOXAPARIN SODIUM 40 MILLIGRAM(S): 60 INJECTION INTRAVENOUS; SUBCUTANEOUS at 09:38

## 2024-12-20 RX ADMIN — Medication 25 MILLIGRAM(S): at 13:29

## 2024-12-20 RX ADMIN — ACETAMINOPHEN 650 MILLIGRAM(S): 80 SOLUTION/ DROPS ORAL at 12:26

## 2024-12-20 RX ADMIN — Medication 9 UNIT(S): at 16:52

## 2024-12-20 RX ADMIN — Medication 100 MILLIGRAM(S): at 12:25

## 2024-12-20 RX ADMIN — Medication 100 MILLIGRAM(S): at 04:22

## 2024-12-20 RX ADMIN — Medication 500 MILLIGRAM(S): at 05:26

## 2024-12-20 RX ADMIN — ATORVASTATIN CALCIUM 80 MILLIGRAM(S): 40 TABLET, FILM COATED ORAL at 21:09

## 2024-12-20 RX ADMIN — Medication 25 MILLIGRAM(S): at 17:01

## 2024-12-20 RX ADMIN — ACETAMINOPHEN 650 MILLIGRAM(S): 80 SOLUTION/ DROPS ORAL at 19:15

## 2024-12-20 RX ADMIN — METOCLOPRAMIDE 10 MILLIGRAM(S): 10 TABLET ORAL at 05:26

## 2024-12-20 RX ADMIN — ACETAMINOPHEN 650 MILLIGRAM(S): 80 SOLUTION/ DROPS ORAL at 17:45

## 2024-12-20 RX ADMIN — COLCHICINE 0.6 MILLIGRAM(S): 0.6 CAPSULE ORAL at 03:35

## 2024-12-20 RX ADMIN — GABAPENTIN 100 MILLIGRAM(S): 300 CAPSULE ORAL at 13:29

## 2024-12-20 RX ADMIN — AMIODARONE HYDROCHLORIDE 400 MILLIGRAM(S): 200 TABLET ORAL at 17:44

## 2024-12-20 RX ADMIN — Medication 25 MILLIGRAM(S): at 21:09

## 2024-12-20 NOTE — PROGRESS NOTE ADULT - SUBJECTIVE AND OBJECTIVE BOX
POD # 1 s/p CABG X 3 nl EF     Brief history :     24 hour events :     ICU Vital Signs Last 24 Hrs  T(C): 36.3 (12-20-24 @ 04:00), Max: 37.1 (12-19-24 @ 20:00)  T(F): 97.3 (12-20-24 @ 04:00), Max: 98.8 (12-19-24 @ 20:00)  HR: 83 (12-20-24 @ 06:00) (69 - 113)  ABP: 131/58 (12-20-24 @ 06:00) (100/50 - 154/76)  ABP(mean): 78 (12-20-24 @ 06:00) (63 - 107)  RR: 17 (12-20-24 @ 06:00) (10 - 63)  SpO2: 100% (12-20-24 @ 06:00) (93% - 100%)    I&O's Summary    19 Dec 2024 07:01  -  20 Dec 2024 07:00  --------------------------------------------------------  IN: 2155 mL / OUT: 2015 mL / NET: 140 mL    ABG - ( 20 Dec 2024 01:05 )  pH: 7.38  /  pCO2: 42    /  pO2: 165   / HCO3: 25    / Base Excess: -0.4  /  SaO2: 99.9                    10.3   12.99 )-----------( 123      ( 20 Dec 2024 01:11 )             30.3     20 Dec 2024 01:11    139    |  103    |  16     ----------------------------<  126    4.3     |  23     |  0.96     Ca    8.5        20 Dec 2024 01:11  Phos  3.8       20 Dec 2024 01:11  Mg     1.9       20 Dec 2024 01:11    TPro  5.8    /  Alb  4.3    /  TBili  1.0    /  DBili  x      /  AST  28     /  ALT  13     /  AlkPhos  27     20 Dec 2024 01:11    PT/INR - ( 20 Dec 2024 02:25 )   PT: 12.7 sec;   INR: 1.11 ratio    PTT - ( 20 Dec 2024 02:25 )  PTT:25.4 sec    MEDICATIONS  (STANDING):  acetaminophen     Tablet .. 650 milliGRAM(s) Oral every 6 hours  aMIOdarone    Tablet 400 milliGRAM(s) Oral two times a day  amLODIPine   Tablet 5 milliGRAM(s) Oral daily  ascorbic acid 500 milliGRAM(s) Oral two times a day  aspirin enteric coated 81 milliGRAM(s) Oral daily  aspirin Suppository 300 milliGRAM(s) Rectal once  atorvastatin 80 milliGRAM(s) Oral at bedtime  cefuroxime  IVPB 1500 milliGRAM(s) IV Intermittent every 8 hours  chlorhexidine 2% Cloths 1 Application(s) Topical daily  colchicine 0.6 milliGRAM(s) Oral two times a day  dexMEDEtomidine Infusion 0.5 MICROgram(s)/kG/Hr IV Continuous <Continuous>  enoxaparin Injectable 40 milliGRAM(s) SubCutaneous every 24 hours  gabapentin 100 milliGRAM(s) Oral every 8 hours  insulin regular Infusion 3 Unit(s)/Hr IV Continuous <Continuous>  metoclopramide Injectable 10 milliGRAM(s) IV Push every 8 hours  metoprolol tartrate 25 milliGRAM(s) Oral every 8 hours  pantoprazole    Tablet 40 milliGRAM(s) Oral before breakfast  polyethylene glycol 3350 17 Gram(s) Oral daily  senna 2 Tablet(s) Oral at bedtime  sodium chloride 0.9%. 1000 milliLiter(s) IV Continuous <Continuous>    Home Medications:  aspirin 81 mg oral tablet, chewable: 1 tab(s) orally once a day (17 Dec 2024 17:54)  atorvastatin 40 mg oral tablet: 1 tab(s) orally once a day (at bedtime) (17 Dec 2024 17:54)  heparin 100 units/mL-D5% intravenous solution: 8,000 unit(s) intravenous every hour rate at the time of discharge 8ml/hour (17 Dec 2024 17:54)  HumaLOG 100 units/mL subcutaneous solution: 1 unit(s) subcutaneous 3 times a day (with meals) Use with Insulin Pump - due to be refilled on 12/17 (17 Dec 2024 17:54)  Insulin Pump: Use with Humalog- due to be refilled on 12/17  Basal rate: 1/2 units/hr, max bolus 4 units/hr  Insulin to Carb Ratio: 1 unit: 6g   ISF: 1 unit : 15 points  BG Target: 120 mg/dL (16 Dec 2024 12:30)  nitroglycerin: 1 tab(s) sublingually every 3 to 5 minutes as needed for  chest pain (17 Dec 2024 17:54)    PHYSICAL EXAM:  Gen : no acute distress  Neck: No LAD, No JVD  Respiratory: decreased in the bases  Cardiovascular: S1 and S2, RRR, no M/G/R  Gastrointestinal: BS+, soft, NT/ND  Extremities: No peripheral edema  Vascular: 2+ peripheral pulses  Neurological: A/O x 3, no focal deficits  Incision: clean dry/ no sign of infection  Lines: no sign of infection      S/p   Acute post operative respiratory insufficiency  Acute blood loss anemia/Thrombocytopenia  Electrolyte abnormalities - Hypomag/Hypokalcemia  Hypotension   Cardiogenic shock  Post operative pain   Lactic acidosis  Post op AFib    Encounter for ventilator weaning      Neuro  Neuro assessment  Sedation   Multimodal pain management    CVS   s/p   EF  Pressors -   Inotrope -   MCS -   Rhythm  Chest tube management    Pulm   Ventilator weaning as tolerated   Fast track extubation     GI   GI proph/Bowel regimen       Monitor UOP  Correct Electrolytes K >4.0-4.5 Mag 2.0-2.5    Endo   A1c  Glycemic control per protocl Glucose <180  Thyroid     Heme   Correct coagulapathy, monitor for bleeding  ASA  P2Y12  DVT proph  Acute blood loss anemia expected post operative loss - IV Fe, Epo    ID   Periop antibiotics  MRSA screen         Critical care time spent    min    POD # 1 s/p CABG X 3 nl EF       Brief history : 43yo with history of Type 1 DM admitted with Acute chest pain.  Ruled in for NSTEMI, [facility] showed 3 vessel CAD     12/19 CABGx3 : AARON to LAD, Radial Artery with angela blas to PDA , SVG to Diag  12/20 Doing well, no acute issues       ICU Vital Signs Last 24 Hrs  T(C): 36.3 (12-20-24 @ 04:00), Max: 37.1 (12-19-24 @ 20:00)  T(F): 97.3 (12-20-24 @ 04:00), Max: 98.8 (12-19-24 @ 20:00)  HR: 83 (12-20-24 @ 06:00) (69 - 113)  ABP: 131/58 (12-20-24 @ 06:00) (100/50 - 154/76)  ABP(mean): 78 (12-20-24 @ 06:00) (63 - 107)  RR: 17 (12-20-24 @ 06:00) (10 - 63)  SpO2: 100% (12-20-24 @ 06:00) (93% - 100%)    I&O's Summary    19 Dec 2024 07:01  -  20 Dec 2024 07:00  --------------------------------------------------------  IN: 2155 mL / OUT: 2015 mL / NET: 140 mL    ABG - ( 20 Dec 2024 01:05 )  pH: 7.38  /  pCO2: 42    /  pO2: 165   / HCO3: 25    / Base Excess: -0.4  /  SaO2: 99.9                    10.3   12.99 )-----------( 123      ( 20 Dec 2024 01:11 )             30.3     20 Dec 2024 01:11    139    |  103    |  16     ----------------------------<  126    4.3     |  23     |  0.96     Ca    8.5        20 Dec 2024 01:11  Phos  3.8       20 Dec 2024 01:11  Mg     1.9       20 Dec 2024 01:11    TPro  5.8    /  Alb  4.3    /  TBili  1.0    /  DBili  x      /  AST  28     /  ALT  13     /  AlkPhos  27     20 Dec 2024 01:11    PT/INR - ( 20 Dec 2024 02:25 )   PT: 12.7 sec;   INR: 1.11 ratio    PTT - ( 20 Dec 2024 02:25 )  PTT:25.4 sec    MEDICATIONS  (STANDING):  acetaminophen     Tablet .. 650 milliGRAM(s) Oral every 6 hours  gabapentin 100 milliGRAM(s) Oral every 8 hours    aMIOdarone    Tablet 400 milliGRAM(s) Oral two times a day  amLODIPine   Tablet 5 milliGRAM(s) Oral daily  ascorbic acid 500 milliGRAM(s) Oral two times a day  aspirin enteric coated 81 milliGRAM(s) Oral daily  aspirin Suppository 300 milliGRAM(s) Rectal once  atorvastatin 80 milliGRAM(s) Oral at bedtime  colchicine 0.6 milliGRAM(s) Oral two times a day  metoprolol tartrate 25 milliGRAM(s) Oral every 8 hours    cefuroxime  IVPB 1500 milliGRAM(s) IV Intermittent every 8 hours    enoxaparin Injectable 40 milliGRAM(s) SubCutaneous every 24 hours    insulin regular Infusion 3 Unit(s)/Hr IV Continuous <Continuous>    metoclopramide Injectable 10 milliGRAM(s) IV Push every 8 hours  pantoprazole    Tablet 40 milliGRAM(s) Oral before breakfast  polyethylene glycol 3350 17 Gram(s) Oral daily  senna 2 Tablet(s) Oral at bedtime        PHYSICAL EXAM:  Gen : no acute distress  Neck: No LAD, No JVD  Respiratory: decreased in the bases  Cardiovascular: S1 and S2, RRR, no M/G/R  Gastrointestinal: BS+, soft, NT/ND  Extremities: No peripheral edema  Vascular: 2+ peripheral pulses  Neurological: A/O x 3, no focal deficits  Incision: clean dry/ no sign of infection  Lines: no sign of infection      S/p CABG X 3  in setting of NSTEMI   Acute post operative respiratory insufficiency  Acute blood loss anemia (expected )  Electrolyte abnormalities  Post operative pain   Hyperglycemia      Neuro  Neuro assessment  Multimodal pain management    CVS   s/p CABG X 3   EF nl   Rhythm   Chest tube management    Pulm   Ventilator weaning as tolerated   Fast track extubation     GI   GI proph/Bowel regimen       Monitor UOP  Correct Electrolytes K >4.0-4.5 Mag 2.0-2.5    Endo   Endo follow re DM management.  Plan for insulin drip today and likely transition to lantus    Heme   ASA  DVT proph  Acute blood loss anemia expected post operative loss    ID   Periop antibiotics      Critical care time spent  42  min

## 2024-12-20 NOTE — PROGRESS NOTE ADULT - PROBLEM SELECTOR PLAN 1
Will continue current insulin regimen for now.   Will continue monitoring FS, log, and glucose trends, will Follow up.

## 2024-12-20 NOTE — PHYSICAL THERAPY INITIAL EVALUATION ADULT - ADDITIONAL COMMENTS
Pt lives in a private house with wife and kids with 2 steps to enter, bedroom on 2nd level however pt able to stay on main floor if needed. Pt was fully independent with ADLs and functional mobility PTA without an AD.

## 2024-12-20 NOTE — PHYSICAL THERAPY INITIAL EVALUATION ADULT - PERTINENT HX OF CURRENT PROBLEM, REHAB EVAL
44y Male with significant PMH of DM-1 (since age 20) with insulin pump in place and  on insulin pump, left frozen shoulder in the past and positive family h/o of heart disease. Presents to [facility] transferred from [facility]. Initially presented in ED with c/o left sided chest pain which started at about 10.00 am the day prior to presenting to the hospital. Patient had 4 episodes of chest pain lasting about 30 seconds The 4th episode of left sided chest pain at about 9.00-9.30 pm that night. Patient and wife called 911, and he was BIBA to [place] ED. He has received ASA in ambulance. 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 Presents to [facility] transferred from [facility]. Initially presented in ED with c/o left sided chest pain which started at about 10.00 am the day prior to presenting to the hospital. Patient had 4 episodes of chest pain lasting about 30 seconds The 4th episode of left sided chest pain at about 9.00-9.30 pm that night. Patient and wife called 911, and he was BIBA to [place] ED. He has received ASA in ambulance. CAD s/p CABG x3, with OC 12/19/24

## 2024-12-20 NOTE — PROGRESS NOTE ADULT - ASSESSMENT
Assessment  DMT1: 44y Male with DM T1 with hyperglycemia, A1C 6.7% , was on  insulin pump at home, now for cabg surgery, uses omnipod pump with dexcom 7, humalog, now postop on IV insulin, sugars stable.  Will need tight glycemic control for postop wound healing.   CAD: on medications, stable, monitored. cagb postop   HTN: on antihypertensive medications, monitored, asymptomatic.         Calos Cortés MD  Cell: [phone]  Office: [phone]

## 2024-12-20 NOTE — PROGRESS NOTE ADULT - SUBJECTIVE AND OBJECTIVE BOX
Chief complaint    Patient is a 44y old  Male who presents with a chief complaint of sob (18 Dec 2024 06:07)   Review of systems  Patient appears comfortable.    Labs and Fingersticks  CAPILLARY BLOOD GLUCOSE  111 (20 Dec 2024 05:00)  96 (20 Dec 2024 04:00)  92 (20 Dec 2024 03:00)  103 (20 Dec 2024 02:00)  124 (20 Dec 2024 01:00)  140 (20 Dec 2024 00:00)  126 (19 Dec 2024 23:00)  134 (19 Dec 2024 22:00)  136 (19 Dec 2024 21:00)  140 (19 Dec 2024 20:00)  131 (19 Dec 2024 19:00)  121 (19 Dec 2024 18:00)  106 (19 Dec 2024 17:00)  105 (19 Dec 2024 16:00)  118 (19 Dec 2024 15:00)  147 (19 Dec 2024 13:51)      POCT Blood Glucose.: 147 mg/dL (20 Dec 2024 08:19)  POCT Blood Glucose.: 158 mg/dL (20 Dec 2024 07:04)  POCT Blood Glucose.: 111 mg/dL (20 Dec 2024 05:10)  POCT Blood Glucose.: 95 mg/dL (20 Dec 2024 04:05)  POCT Blood Glucose.: 92 mg/dL (20 Dec 2024 02:52)  POCT Blood Glucose.: 103 mg/dL (20 Dec 2024 02:06)  POCT Blood Glucose.: 124 mg/dL (20 Dec 2024 01:03)  POCT Blood Glucose.: 140 mg/dL (20 Dec 2024 00:04)  POCT Blood Glucose.: 134 mg/dL (19 Dec 2024 22:47)  POCT Blood Glucose.: 134 mg/dL (19 Dec 2024 21:54)  POCT Blood Glucose.: 136 mg/dL (19 Dec 2024 20:59)  POCT Blood Glucose.: 140 mg/dL (19 Dec 2024 19:57)  POCT Blood Glucose.: 131 mg/dL (19 Dec 2024 18:58)  POCT Blood Glucose.: 121 mg/dL (19 Dec 2024 18:11)  POCT Blood Glucose.: 106 mg/dL (19 Dec 2024 17:10)  POCT Blood Glucose.: 108 mg/dL (19 Dec 2024 15:56)      Anion Gap: 13 (12-20 @ 01:11)  Anion Gap: 13 (12-19 @ 14:18)      Calcium: 8.5 (12-20 @ 01:11)  Calcium: 8.7 (12-19 @ 14:18)  Albumin: 4.3 (12-20 @ 01:11)  Albumin: 3.3 (12-19 @ 14:18)    Alanine Aminotransferase (ALT/SGPT): 13 (12-20 @ 01:11)  Alanine Aminotransferase (ALT/SGPT): 14 (12-19 @ 14:18)  Alkaline Phosphatase: 27 *L* (12-20 @ 01:11)  Alkaline Phosphatase: 33 *L* (12-19 @ 14:18)  Aspartate Aminotransferase (AST/SGOT): 28 (12-20 @ 01:11)  Aspartate Aminotransferase (AST/SGOT): 28 (12-19 @ 14:18)        12-20    139  |  103  |  16  ----------------------------<  126[H]  4.3   |  23  |  0.96    Ca    8.5      20 Dec 2024 01:11  Phos  3.8     12-20  Mg     1.9     12-20    TPro  5.8[L]  /  Alb  4.3  /  TBili  1.0  /  DBili  x   /  AST  28  /  ALT  13  /  AlkPhos  27[L]  12-20                        10.3   12.99 )-----------( 123      ( 20 Dec 2024 01:11 )             30.3     Medications  MEDICATIONS  (STANDING):  acetaminophen     Tablet .. 650 milliGRAM(s) Oral every 6 hours  aMIOdarone    Tablet 400 milliGRAM(s) Oral two times a day  ascorbic acid 500 milliGRAM(s) Oral two times a day  aspirin enteric coated 81 milliGRAM(s) Oral daily  aspirin Suppository 300 milliGRAM(s) Rectal once  atorvastatin 80 milliGRAM(s) Oral at bedtime  cefuroxime  IVPB 1500 milliGRAM(s) IV Intermittent every 8 hours  chlorhexidine 2% Cloths 1 Application(s) Topical daily  colchicine 0.6 milliGRAM(s) Oral two times a day  dexMEDEtomidine Infusion 0.5 MICROgram(s)/kG/Hr (8.43 mL/Hr) IV Continuous <Continuous>  dextrose 5%. 1000 milliLiter(s) (30 mL/Hr) IV Continuous <Continuous>  dextrose 50% Injectable 50 milliLiter(s) IV Push every 15 minutes  dextrose 50% Injectable 25 milliLiter(s) IV Push every 15 minutes  enoxaparin Injectable 40 milliGRAM(s) SubCutaneous every 24 hours  gabapentin 100 milliGRAM(s) Oral every 8 hours  insulin regular Infusion 3 Unit(s)/Hr (3 mL/Hr) IV Continuous <Continuous>  metoclopramide Injectable 10 milliGRAM(s) IV Push every 8 hours  metoprolol tartrate 25 milliGRAM(s) Oral every 8 hours  polyethylene glycol 3350 17 Gram(s) Oral daily  senna 2 Tablet(s) Oral at bedtime  sodium chloride 0.9%. 1000 milliLiter(s) (10 mL/Hr) IV Continuous <Continuous>      Physical Exam  General: Patient appears comfortable.  Vital Signs Last 12 Hrs  T(F): 97.3 (12-20-24 @ 04:00), Max: 98.2 (12-19-24 @ 23:00)  HR: 83 (12-20-24 @ 06:00) (77 - 113)  BP: --  BP(mean): --  RR: 17 (12-20-24 @ 06:00) (13 - 28)  SpO2: 100% (12-20-24 @ 06:00) (96% - 100%)  Neck: No palpable thyroid nodules.  CVS: S1S2, No murmurs  Respiratory: No wheezing, no crepitations  GI: Abdomen soft, non tender.    Diagnostics        Radiology:

## 2024-12-20 NOTE — PHYSICAL THERAPY INITIAL EVALUATION ADULT - GENERAL OBSERVATIONS, REHAB EVAL
Rec'd sitting in chair in NAD, VSS, A/Ox4, +R IJ, +ICU monitoring, +a-line, +CT x3, +2LO2 via NC, +external pacemaker, agreeable to PT.

## 2024-12-21 DIAGNOSIS — Z95.1 PRESENCE OF AORTOCORONARY BYPASS GRAFT: ICD-10-CM

## 2024-12-21 LAB
ALBUMIN SERPL ELPH-MCNC: 4 G/DL — SIGNIFICANT CHANGE UP (ref 3.3–5)
ALP SERPL-CCNC: 34 U/L — LOW (ref 40–120)
ALT FLD-CCNC: 18 U/L — SIGNIFICANT CHANGE UP (ref 10–45)
ANION GAP SERPL CALC-SCNC: 11 MMOL/L — SIGNIFICANT CHANGE UP (ref 5–17)
AST SERPL-CCNC: 20 U/L — SIGNIFICANT CHANGE UP (ref 10–40)
BASOPHILS # BLD AUTO: 0.03 K/UL — SIGNIFICANT CHANGE UP (ref 0–0.2)
BASOPHILS NFR BLD AUTO: 0.2 % — SIGNIFICANT CHANGE UP (ref 0–2)
BILIRUB SERPL-MCNC: 0.5 MG/DL — SIGNIFICANT CHANGE UP (ref 0.2–1.2)
BUN SERPL-MCNC: 17 MG/DL — SIGNIFICANT CHANGE UP (ref 7–23)
CALCIUM SERPL-MCNC: 9.1 MG/DL — SIGNIFICANT CHANGE UP (ref 8.4–10.5)
CHLORIDE SERPL-SCNC: 105 MMOL/L — SIGNIFICANT CHANGE UP (ref 96–108)
CO2 SERPL-SCNC: 25 MMOL/L — SIGNIFICANT CHANGE UP (ref 22–31)
CREAT SERPL-MCNC: 0.87 MG/DL — SIGNIFICANT CHANGE UP (ref 0.5–1.3)
EGFR: 108 ML/MIN/1.73M2 — SIGNIFICANT CHANGE UP
EOSINOPHIL # BLD AUTO: 0 K/UL — SIGNIFICANT CHANGE UP (ref 0–0.5)
EOSINOPHIL NFR BLD AUTO: 0 % — SIGNIFICANT CHANGE UP (ref 0–6)
GLUCOSE BLDC GLUCOMTR-MCNC: 102 MG/DL — HIGH (ref 70–99)
GLUCOSE BLDC GLUCOMTR-MCNC: 106 MG/DL — HIGH (ref 70–99)
GLUCOSE BLDC GLUCOMTR-MCNC: 114 MG/DL — HIGH (ref 70–99)
GLUCOSE BLDC GLUCOMTR-MCNC: 135 MG/DL — HIGH (ref 70–99)
GLUCOSE BLDC GLUCOMTR-MCNC: 144 MG/DL — HIGH (ref 70–99)
GLUCOSE BLDC GLUCOMTR-MCNC: 160 MG/DL — HIGH (ref 70–99)
GLUCOSE BLDC GLUCOMTR-MCNC: 207 MG/DL — HIGH (ref 70–99)
GLUCOSE BLDC GLUCOMTR-MCNC: 232 MG/DL — HIGH (ref 70–99)
GLUCOSE BLDC GLUCOMTR-MCNC: 250 MG/DL — HIGH (ref 70–99)
GLUCOSE BLDC GLUCOMTR-MCNC: 267 MG/DL — HIGH (ref 70–99)
GLUCOSE BLDC GLUCOMTR-MCNC: 274 MG/DL — HIGH (ref 70–99)
GLUCOSE BLDC GLUCOMTR-MCNC: 289 MG/DL — HIGH (ref 70–99)
GLUCOSE BLDC GLUCOMTR-MCNC: 301 MG/DL — HIGH (ref 70–99)
GLUCOSE BLDC GLUCOMTR-MCNC: 315 MG/DL — HIGH (ref 70–99)
GLUCOSE BLDC GLUCOMTR-MCNC: 90 MG/DL — SIGNIFICANT CHANGE UP (ref 70–99)
GLUCOSE SERPL-MCNC: 184 MG/DL — HIGH (ref 70–99)
HCT VFR BLD CALC: 32.4 % — LOW (ref 39–50)
HGB BLD-MCNC: 10.8 G/DL — LOW (ref 13–17)
IMM GRANULOCYTES NFR BLD AUTO: 0.5 % — SIGNIFICANT CHANGE UP (ref 0–0.9)
LYMPHOCYTES # BLD AUTO: 0.91 K/UL — LOW (ref 1–3.3)
LYMPHOCYTES # BLD AUTO: 5.9 % — LOW (ref 13–44)
MAGNESIUM SERPL-MCNC: 2.4 MG/DL — SIGNIFICANT CHANGE UP (ref 1.6–2.6)
MCHC RBC-ENTMCNC: 31.8 PG — SIGNIFICANT CHANGE UP (ref 27–34)
MCHC RBC-ENTMCNC: 33.3 G/DL — SIGNIFICANT CHANGE UP (ref 32–36)
MCV RBC AUTO: 95.3 FL — SIGNIFICANT CHANGE UP (ref 80–100)
MONOCYTES # BLD AUTO: 1.29 K/UL — HIGH (ref 0–0.9)
MONOCYTES NFR BLD AUTO: 8.4 % — SIGNIFICANT CHANGE UP (ref 2–14)
NEUTROPHILS # BLD AUTO: 13.05 K/UL — HIGH (ref 1.8–7.4)
NEUTROPHILS NFR BLD AUTO: 85 % — HIGH (ref 43–77)
NRBC # BLD: 0 /100 WBCS — SIGNIFICANT CHANGE UP (ref 0–0)
PHOSPHATE SERPL-MCNC: 2.3 MG/DL — LOW (ref 2.5–4.5)
PLATELET # BLD AUTO: 128 K/UL — LOW (ref 150–400)
POTASSIUM SERPL-MCNC: 4.8 MMOL/L — SIGNIFICANT CHANGE UP (ref 3.5–5.3)
POTASSIUM SERPL-SCNC: 4.8 MMOL/L — SIGNIFICANT CHANGE UP (ref 3.5–5.3)
PROT SERPL-MCNC: 5.8 G/DL — LOW (ref 6–8.3)
RBC # BLD: 3.4 M/UL — LOW (ref 4.2–5.8)
RBC # FLD: 12.4 % — SIGNIFICANT CHANGE UP (ref 10.3–14.5)
SODIUM SERPL-SCNC: 141 MMOL/L — SIGNIFICANT CHANGE UP (ref 135–145)
WBC # BLD: 15.35 K/UL — HIGH (ref 3.8–10.5)
WBC # FLD AUTO: 15.35 K/UL — HIGH (ref 3.8–10.5)

## 2024-12-21 PROCEDURE — 71045 X-RAY EXAM CHEST 1 VIEW: CPT | Mod: 26

## 2024-12-21 PROCEDURE — 99291 CRITICAL CARE FIRST HOUR: CPT

## 2024-12-21 PROCEDURE — 93010 ELECTROCARDIOGRAM REPORT: CPT

## 2024-12-21 RX ORDER — INSULIN LISPRO 100/ML
13 VIAL (ML) SUBCUTANEOUS
Refills: 0 | Status: DISCONTINUED | OUTPATIENT
Start: 2024-12-21 | End: 2024-12-22

## 2024-12-21 RX ORDER — ALPRAZOLAM 0.25 MG/1
0.25 TABLET ORAL ONCE
Refills: 0 | Status: DISCONTINUED | OUTPATIENT
Start: 2024-12-21 | End: 2024-12-21

## 2024-12-21 RX ORDER — INSULIN GLARGINE-YFGN 100 [IU]/ML
40 INJECTION, SOLUTION SUBCUTANEOUS AT BEDTIME
Refills: 0 | Status: DISCONTINUED | OUTPATIENT
Start: 2024-12-21 | End: 2024-12-22

## 2024-12-21 RX ORDER — INSULIN HUMAN 100 [IU]/ML
4 INJECTION, SOLUTION SUBCUTANEOUS
Qty: 100 | Refills: 0 | Status: DISCONTINUED | OUTPATIENT
Start: 2024-12-21 | End: 2024-12-22

## 2024-12-21 RX ADMIN — Medication 4: at 08:16

## 2024-12-21 RX ADMIN — Medication 10 MILLIGRAM(S): at 15:55

## 2024-12-21 RX ADMIN — Medication 500 MILLIGRAM(S): at 05:52

## 2024-12-21 RX ADMIN — ALPRAZOLAM 0.25 MILLIGRAM(S): 0.25 TABLET ORAL at 22:19

## 2024-12-21 RX ADMIN — ACETAMINOPHEN 650 MILLIGRAM(S): 80 SOLUTION/ DROPS ORAL at 00:00

## 2024-12-21 RX ADMIN — ACETAMINOPHEN 650 MILLIGRAM(S): 80 SOLUTION/ DROPS ORAL at 13:17

## 2024-12-21 RX ADMIN — GABAPENTIN 100 MILLIGRAM(S): 300 CAPSULE ORAL at 05:52

## 2024-12-21 RX ADMIN — GABAPENTIN 100 MILLIGRAM(S): 300 CAPSULE ORAL at 13:19

## 2024-12-21 RX ADMIN — Medication 500 MILLIGRAM(S): at 17:19

## 2024-12-21 RX ADMIN — PANTOPRAZOLE 40 MILLIGRAM(S): 40 TABLET, DELAYED RELEASE ORAL at 06:20

## 2024-12-21 RX ADMIN — ACETAMINOPHEN 650 MILLIGRAM(S): 80 SOLUTION/ DROPS ORAL at 06:00

## 2024-12-21 RX ADMIN — COLCHICINE 0.6 MILLIGRAM(S): 0.6 CAPSULE ORAL at 05:53

## 2024-12-21 RX ADMIN — METOCLOPRAMIDE 10 MILLIGRAM(S): 10 TABLET ORAL at 05:51

## 2024-12-21 RX ADMIN — Medication 25 MILLIGRAM(S): at 22:19

## 2024-12-21 RX ADMIN — ATORVASTATIN CALCIUM 80 MILLIGRAM(S): 40 TABLET, FILM COATED ORAL at 22:19

## 2024-12-21 RX ADMIN — Medication 25 MILLIGRAM(S): at 13:19

## 2024-12-21 RX ADMIN — ENOXAPARIN SODIUM 40 MILLIGRAM(S): 60 INJECTION INTRAVENOUS; SUBCUTANEOUS at 09:16

## 2024-12-21 RX ADMIN — AMIODARONE HYDROCHLORIDE 400 MILLIGRAM(S): 200 TABLET ORAL at 17:19

## 2024-12-21 RX ADMIN — CHLORHEXIDINE GLUCONATE 1 APPLICATION(S): 1.2 RINSE ORAL at 11:46

## 2024-12-21 RX ADMIN — Medication 100 MILLIGRAM(S): at 03:32

## 2024-12-21 RX ADMIN — Medication 10 MILLIGRAM(S): at 16:55

## 2024-12-21 RX ADMIN — COLCHICINE 0.6 MILLIGRAM(S): 0.6 CAPSULE ORAL at 17:18

## 2024-12-21 RX ADMIN — ACETAMINOPHEN 650 MILLIGRAM(S): 80 SOLUTION/ DROPS ORAL at 05:52

## 2024-12-21 RX ADMIN — Medication 17 GRAM(S): at 05:53

## 2024-12-21 RX ADMIN — AMIODARONE HYDROCHLORIDE 400 MILLIGRAM(S): 200 TABLET ORAL at 05:52

## 2024-12-21 RX ADMIN — Medication 81 MILLIGRAM(S): at 13:19

## 2024-12-21 RX ADMIN — Medication 2.5 MILLIGRAM(S): at 05:51

## 2024-12-21 RX ADMIN — ACETAMINOPHEN 650 MILLIGRAM(S): 80 SOLUTION/ DROPS ORAL at 17:19

## 2024-12-21 RX ADMIN — INSULIN GLARGINE-YFGN 40 UNIT(S): 100 INJECTION, SOLUTION SUBCUTANEOUS at 22:19

## 2024-12-21 RX ADMIN — GABAPENTIN 100 MILLIGRAM(S): 300 CAPSULE ORAL at 22:19

## 2024-12-21 RX ADMIN — ACETAMINOPHEN 650 MILLIGRAM(S): 80 SOLUTION/ DROPS ORAL at 00:30

## 2024-12-21 RX ADMIN — Medication 25 MILLIGRAM(S): at 06:58

## 2024-12-21 NOTE — PROGRESS NOTE ADULT - ASSESSMENT
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 Presents to [facility] transferred from [facility]. Initially presented in ED with c/o left sided chest pain which started at about 10.00 am the day prior to presenting to the hospital. Patient had 4 episodes of chest pain lasting about 30 seconds The 4th episode of left sided chest pain at about 9.00-9.30 pm that night. Patient and wife called 911, and he was BIBA to [place] ED. He received ASA in ambulance.  In the ED Troponin trending up: 99.14->184.77, EK bpm in NSR and no acute ST-T changes. 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 Transferred to [facility] CT Surgery Dr. Ibrahim for CABG eval.     Hospital Course:   24- VSS CAD- insulin pump in place - will consult endo to manage glucose- OR date TBD   s/p CABG x 3/ LIMA to LAD / Radial Artery with vein blas to PDA / SVG to Diag.   Post op Course:  Extubated POD # 0   Hypertension on Cardene gtt --> weaned off   Patient with severe anxiety requiring Precedex gtt.    Type 1 DM on insulin gtt. Endocrine following.  Pre op on insulin pump at home.    VSS; Transferred to SDU.    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 Presents to [facility] transferred from [facility]. Initially presented in ED with c/o left sided chest pain which started at about 10.00 am the day prior to presenting to the hospital. Patient had 4 episodes of chest pain lasting about 30 seconds The 4th episode of left sided chest pain at about 9.00-9.30 pm that night. Patient and wife called 911, and he was BIBA to [place] ED. He received ASA in ambulance.  In the ED Troponin trending up: 99.14->184.77, EK bpm in NSR and no acute ST-T changes. 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 Transferred to [facility] CT Surgery Dr. Ibrahim for CABG eval.     Hospital Course:   24- VSS CAD- insulin pump in place - will consult endo to manage glucose- OR date TBD   s/p CABG x 3/ LIMA to LAD / Radial Artery with vein blas to PDA / SVG to Diag.   Post op Course:  Extubated POD # 0   Hypertension on Cardene gtt --> weaned off   Patient with severe anxiety requiring Precedex gtt.    Type 1 DM on insulin gtt. Endocrine following.  Pre op on insulin pump at home.    VSS; Transferred to SDU. Maintain MS and Right CT in place to LWS, likely to D/C in AM.  Continue with current medication regimen.  Insulin gtt per Endo.  Starting Lantus 40 units HS.  Continue on Colchicine 0.6 mg PO BID for PPS.     Disposition: Home once medically cleared.

## 2024-12-21 NOTE — PROGRESS NOTE ADULT - ASSESSMENT
Assessment  DMT1: 44y Male with DM T1 with hyperglycemia, A1C 6.7% , was on  insulin pump at home, now for cabg surgery, uses omnipod pump with dexcom 7, Humalog now postop on IV insulin, sugars improving.  Will need tight glycemic control for postop wound healing.   CAD: on medications, stable, monitored. cagb postop   HTN: on antihypertensive medications, monitored, asymptomatic.         Calos Cortés MD  Cell: [phone]  Office: [phone]

## 2024-12-21 NOTE — PROGRESS NOTE ADULT - SUBJECTIVE AND OBJECTIVE BOX
POD # 1 s/p CABG X 3 nl EF       Brief history : 45yo with history of Type 1 DM admitted with Acute chest pain.  Ruled in for NSTEMI, [facility] showed 3 vessel CAD     12/19 CABGx3 : AARON to LAD, Radial Artery with angela blas to PDA , SVG to Diag  12/20 Doing well, no acute issues     ICU Vital Signs Last 24 Hrs  T(C): 36.8 (12-21-24 @ 08:00), Max: 37.5 (12-20-24 @ 12:00)  T(F): 98.2 (12-21-24 @ 08:00), Max: 99.5 (12-20-24 @ 12:00)  HR: 75 (12-21-24 @ 07:00) (67 - 88)  BP: 159/76 (12-21-24 @ 07:00) (123/59 - 186/86)  BP(mean): 109 (12-21-24 @ 07:00) (83 - 109)  ABP: 138/82 (12-20-24 @ 16:00) (0/0 - 138/82)  ABP(mean): 107 (12-20-24 @ 16:00) (0 - 107)  RR: 23 (12-21-24 @ 07:00) (6 - 33)  SpO2: 97% (12-21-24 @ 07:00) (94% - 100%)    I&O's Summary    20 Dec 2024 07:01  -  21 Dec 2024 07:00  --------------------------------------------------------  IN: 1079.6 mL / OUT: 1730 mL / NET: -650.4 mL    21 Dec 2024 07:01  -  21 Dec 2024 08:43  --------------------------------------------------------  IN: 16.7 mL / OUT: 115 mL / NET: -98.3 mL            ABG - ( 20 Dec 2024 15:29 )  pH: 7.41  /  pCO2: 40    /  pO2: 110   / HCO3: 25    / Base Excess: 0.7   /  SaO2: 98.2                                    10.8   15.35 )-----------( 128      ( 21 Dec 2024 00:36 )             32.4     21 Dec 2024 00:36    141    |  105    |  17     ----------------------------<  184    4.8     |  25     |  0.87     Ca    9.1        21 Dec 2024 00:36  Phos  2.3       21 Dec 2024 00:36  Mg     2.4       21 Dec 2024 00:36    TPro  5.8    /  Alb  4.0    /  TBili  0.5    /  DBili  x      /  AST  20     /  ALT  18     /  AlkPhos  34     21 Dec 2024 00:36    PT/INR - ( 20 Dec 2024 02:25 )   PT: 12.7 sec;   INR: 1.11 ratio         PTT - ( 20 Dec 2024 02:25 )  PTT:25.4 sec      MEDICATIONS  (STANDING):  acetaminophen     Tablet .. 650 milliGRAM(s) Oral every 6 hours  aMIOdarone    Tablet 400 milliGRAM(s) Oral two times a day  amLODIPine   Tablet 2.5 milliGRAM(s) Oral daily  ascorbic acid 500 milliGRAM(s) Oral two times a day  aspirin enteric coated 81 milliGRAM(s) Oral daily  atorvastatin 80 milliGRAM(s) Oral at bedtime  bisacodyl Suppository 10 milliGRAM(s) Rectal once  chlorhexidine 2% Cloths 1 Application(s) Topical daily  colchicine 0.6 milliGRAM(s) Oral two times a day  dexMEDEtomidine Infusion 0.2 MICROgram(s)/kG/Hr IV Continuous <Continuous>  dextrose 5%. 1000 milliLiter(s) IV Continuous <Continuous>  dextrose 5%. 1000 milliLiter(s) IV Continuous <Continuous>  dextrose 50% Injectable 50 milliLiter(s) IV Push every 15 minutes  dextrose 50% Injectable 25 milliLiter(s) IV Push every 15 minutes  enoxaparin Injectable 40 milliGRAM(s) SubCutaneous every 24 hours  gabapentin 100 milliGRAM(s) Oral every 8 hours  glucagon  Injectable 1 milliGRAM(s) IntraMuscular once  insulin glargine Injectable (LANTUS) 30 Unit(s) SubCutaneous at bedtime  insulin lispro (ADMELOG) corrective regimen sliding scale   SubCutaneous Before meals and at bedtime  insulin lispro Injectable (ADMELOG) 9 Unit(s) SubCutaneous before dinner  metoprolol tartrate 25 milliGRAM(s) Oral every 8 hours  pantoprazole    Tablet 40 milliGRAM(s) Oral before breakfast  polyethylene glycol 3350 17 Gram(s) Oral two times a day  senna 2 Tablet(s) Oral at bedtime  sodium chloride 0.9%. 1000 milliLiter(s) IV Continuous <Continuous>    Home Medications:  aspirin 81 mg oral tablet, chewable: 1 tab(s) orally once a day (17 Dec 2024 17:54)  atorvastatin 40 mg oral tablet: 1 tab(s) orally once a day (at bedtime) (17 Dec 2024 17:54)  heparin 100 units/mL-D5% intravenous solution: 8,000 unit(s) intravenous every hour rate at the time of discharge 8ml/hour (17 Dec 2024 17:54)  HumaLOG 100 units/mL subcutaneous solution: 1 unit(s) subcutaneous 3 times a day (with meals) Use with Insulin Pump - due to be refilled on 12/17 (17 Dec 2024 17:54)  Insulin Pump: Use with Humalog- due to be refilled on 12/17  Basal rate: 1/2 units/hr, max bolus 4 units/hr  Insulin to Carb Ratio: 1 unit: 6g   ISF: 1 unit : 15 points  BG Target: 120 mg/dL (16 Dec 2024 12:30)  nitroglycerin: 1 tab(s) sublingually every 3 to 5 minutes as needed for  chest pain (17 Dec 2024 17:54)    PHYSICAL EXAM:  Gen : no acute distress  Neck: No LAD, No JVD  Respiratory: decreased in the bases  Cardiovascular: S1 and S2, RRR, no M/G/R  Gastrointestinal: BS+, soft, NT/ND  Extremities: No peripheral edema  Vascular: 2+ peripheral pulses  Neurological: A/O x 3, no focal deficits  Incision: clean dry/ no sign of infection  Lines: no sign of infection      S/p CABG X 3  in setting of NSTEMI   Acute post operative respiratory insufficiency  Acute blood loss anemia (expected )  Electrolyte abnormalities  Post operative pain   Hyperglycemia      Neuro  Neuro assessment  Sedated with Precedex for comfort  Multimodal pain management    CVS   s/p CABG X 3   EF nl   Sinus Rhythm  Amiodarone and BB for Afib prophylaxis  Norvasc for BP management  Chest tube management    Pulm   Ventilator weaning as tolerated   Fast track extubation     GI   Tolerating Diet  GI proph/Bowel regimen       Monitor UOP  Correct Electrolytes K >4.0-4.5 Mag 2.0-2.5    Endo   Endo follow re DM management.  Planned for insulin drip yesterday and likely transition to lantus    Heme   ASA  DVT proph  Acute blood loss anemia expected post operative loss    ID   Periop antibiotics          Critical care time spent    min       Care plan discussed with the ICU care team.   Patient remains critical, at risk for life threatening decompensation.    I have spent 30 minutes providing critical care management to this patient.    By signing my name below, I, Noreen Cespedes, attest that this documentation has been prepared under the direction and in the presence of Jose Alfredo Moreno MD.   Electronically signed: Noreen Cespedes, 12-21-24 @ 08:43    I, Jose Alfredo Moreno, personally performed the services described in this documentation. all medical record entries made by the scribe were at my direction and in my presence. I have reviewed the chart and agree that the record reflects my personal performance and is accurate and complete  Electronically signed: Jose Alfredo Moreno MD.

## 2024-12-21 NOTE — PROGRESS NOTE ADULT - SUBJECTIVE AND OBJECTIVE BOX
VITAL SIGNS    Subjective: "I'm feeling ok" Denies CP, palpitation, SOB, TAMEZ, HA, dizziness, N/V/D, fever or chills.  No acute event noted overnight.     Telemetry:     Vital Signs Last 24 Hrs  T(C): 36.7 (24 @ 11:40), Max: 37.2 (24 @ 20:00)  T(F): 98 (24 @ 11:40), Max: 98.9 (24 @ 20:00)  HR: 85 (24 @ 11:40) (72 - 88)  BP: 132/70 (24 @ 11:40) (123/59 - 186/86)  RR: 18 (24 @ 11:40) (6 - 33)  SpO2: 96% (24 @ 11:40) (94% - 99%)            @ 07:01  -   @ 07:00  --------------------------------------------------------  IN: 1079.6 mL / OUT: 1730 mL / NET: -650.4 mL     @ 07:01  -   @ 14:27  --------------------------------------------------------  IN: 361.9 mL / OUT: 690 mL / NET: -328.1 mL    LABS      141  |  105  |  17  ----------------------------<  184[H]  4.8   |  25  |  0.87    Ca    9.1      21 Dec 2024 00:36  Phos  2.3       Mg     2.4         TPro  5.8[L]  /  Alb  4.0  /  TBili  0.5  /  DBili  x   /  AST  20  /  ALT  18  /  AlkPhos  34[L]                                   10.8   15.35 )-----------( 128      ( 21 Dec 2024 00:36 )             32.4          PT/INR - ( 20 Dec 2024 02:25 )   PT: 12.7 sec;   INR: 1.11 ratio         PTT - ( 20 Dec 2024 02:25 )  PTT:25.4 sec        Daily     Daily Weight in k.1 (21 Dec 2024 11:00)    CAPILLARY BLOOD GLUCOSE  181 (20 Dec 2024 22:00)  146 (20 Dec 2024 17:00)  151 (20 Dec 2024 15:00)    POCT Blood Glucose.: 160 mg/dL (21 Dec 2024 14:19)  POCT Blood Glucose.: 207 mg/dL (21 Dec 2024 13:08)  POCT Blood Glucose.: 232 mg/dL (21 Dec 2024 11:57)  POCT Blood Glucose.: 267 mg/dL (21 Dec 2024 10:55)  POCT Blood Glucose.: 250 mg/dL (21 Dec 2024 10:05)  POCT Blood Glucose.: 301 mg/dL (21 Dec 2024 08:54)  POCT Blood Glucose.: 315 mg/dL (21 Dec 2024 08:15)  POCT Blood Glucose.: 289 mg/dL (21 Dec 2024 06:53)  POCT Blood Glucose.: 274 mg/dL (21 Dec 2024 06:52)  POCT Blood Glucose.: 181 mg/dL (20 Dec 2024 21:56)  POCT Blood Glucose.: 146 mg/dL (20 Dec 2024 16:49)  POCT Blood Glucose.: 151 mg/dL (20 Dec 2024 15:41)          Drains:     MS         [  ] Drainage:                 L Pleural  [  ]  Drainage:                R Pleural  [  ]  Drainage:    PHYSICAL EXAM    Neurology: alert and oriented x 3, nonfocal, no gross deficits    CV: (+) S1 and S2, No murmurs, rubs, gallops or clicks     Sternal Wound: MSI -->CDI, sternum stable    Lungs: CTA B/L     Abdomen: soft, nontender, nondistended, positive bowel sounds, (+) Flatus; (+) BM     :  Voiding               Extremities:  B/L LE (+) edema; negative calf tenderness; (+) 2 DP palpable        acetaminophen  Tablet .. 650 milliGRAM(s) Oral every 6 hours  aMIOdarone Tablet 400 milliGRAM(s) Oral two times a day  amLODIPine Tablet 2.5 milliGRAM(s) Oral daily  ascorbic acid 500 milliGRAM(s) Oral two times a day  aspirin enteric coated 81 milliGRAM(s) Oral daily  atorvastatin 80 milliGRAM(s) Oral at bedtime  bisacodyl Suppository 10 milliGRAM(s) Rectal once  chlorhexidine 2% Cloths 1 Application(s) Topical daily  colchicine 0.6 milliGRAM(s) Oral two times a day  dextrose 5%. 1000 milliLiter(s) IV Continuous <Continuous>  dextrose 5%. 1000 milliLiter(s) IV Continuous <Continuous>  dextrose 50% Injectable 50 milliLiter(s) IV Push every 15 minutes  dextrose 50% Injectable 25 milliLiter(s) IV Push every 15 minutes  dextrose Oral Gel 15 Gram(s) Oral once PRN  enoxaparin Injectable 40 milliGRAM(s) SubCutaneous every 24 hours  gabapentin 100 milliGRAM(s) Oral every 8 hours  glucagon  Injectable 1 milliGRAM(s) IntraMuscular once  HYDROmorphone  Injectable 0.5 milliGRAM(s) IV Push every 6 hours PRN  insulin glargine Injectable (LANTUS) 40 Unit(s) SubCutaneous at bedtime  insulin lispro (ADMELOG) corrective regimen sliding scale   SubCutaneous Before meals and at bedtime  insulin lispro Injectable (ADMELOG) 13 Unit(s) SubCutaneous before dinner  insulin regular Infusion 4 Unit(s)/Hr IV Continuous <Continuous>  metoprolol tartrate 25 milliGRAM(s) Oral every 8 hours  oxyCODONE IR 5 milliGRAM(s) Oral every 4 hours PRN  oxyCODONE IR 10 milliGRAM(s) Oral every 4 hours PRN  pantoprazole Tablet 40 milliGRAM(s) Oral before breakfast  polyethylene glycol 3350 17 Gram(s) Oral two times a day  senna 2 Tablet(s) Oral at bedtime  sodium chloride 0.9%. 1000 milliLiter(s) IV Continuous <Continuous>    Physical Therapy Rec:   Home  [ X ]   Home w/ PT  [  ]  Rehab  [  ]    Discussed with Cardiothoracic Team at AM rounds.     VITAL SIGNS    Subjective: "I'm feeling ok" Denies CP, palpitation, SOB, TAMEZ, HA, dizziness, N/V/D, fever or chills.  No acute event noted overnight.     Telemetry: NSR 60-80     Vital Signs Last 24 Hrs  T(C): 36.7 (24 @ 11:40), Max: 37.2 (24 @ 20:00)  T(F): 98 (24 @ 11:40), Max: 98.9 (24 @ 20:00)  HR: 85 (24 @ 11:40) (72 - 88)  BP: 132/70 (24 @ 11:40) (123/59 - 186/86)  RR: 18 (24 @ 11:40) (6 - 33)  SpO2: 96% (24 @ 11:40) (94% - 99%)            @ 07:01  -   @ 07:00  --------------------------------------------------------  IN: 1079.6 mL / OUT: 1730 mL / NET: -650.4 mL     @ 07:01  -   @ 14:27  --------------------------------------------------------  IN: 361.9 mL / OUT: 690 mL / NET: -328.1 mL    LABS      141  |  105  |  17  ----------------------------<  184[H]  4.8   |  25  |  0.87    Ca    9.1      21 Dec 2024 00:36  Phos  2.3       Mg     2.4         TPro  5.8[L]  /  Alb  4.0  /  TBili  0.5  /  DBili  x   /  AST  20  /  ALT  18  /  AlkPhos  34[L]  12-21                                 10.8   15.35 )-----------( 128      ( 21 Dec 2024 00:36 )             32.4          PT/INR - ( 20 Dec 2024 02:25 )   PT: 12.7 sec;   INR: 1.11 ratio         PTT - ( 20 Dec 2024 02:25 )  PTT:25.4 sec        Daily     Daily Weight in k.1 (21 Dec 2024 11:00)    CAPILLARY BLOOD GLUCOSE  181 (20 Dec 2024 22:00)  146 (20 Dec 2024 17:00)  151 (20 Dec 2024 15:00)    POCT Blood Glucose.: 160 mg/dL (21 Dec 2024 14:19)  POCT Blood Glucose.: 207 mg/dL (21 Dec 2024 13:08)  POCT Blood Glucose.: 232 mg/dL (21 Dec 2024 11:57)  POCT Blood Glucose.: 267 mg/dL (21 Dec 2024 10:55)  POCT Blood Glucose.: 250 mg/dL (21 Dec 2024 10:05)  POCT Blood Glucose.: 301 mg/dL (21 Dec 2024 08:54)  POCT Blood Glucose.: 315 mg/dL (21 Dec 2024 08:15)  POCT Blood Glucose.: 289 mg/dL (21 Dec 2024 06:53)  POCT Blood Glucose.: 274 mg/dL (21 Dec 2024 06:52)  POCT Blood Glucose.: 181 mg/dL (20 Dec 2024 21:56)  POCT Blood Glucose.: 146 mg/dL (20 Dec 2024 16:49)  POCT Blood Glucose.: 151 mg/dL (20 Dec 2024 15:41)        PHYSICAL EXAM    Neurology: alert and oriented x 3, nonfocal, no gross deficits    CV: (+) S1 and S2, No murmurs, rubs, gallops or clicks     Sternal Wound: MSI with opsite dressing -->CDI, sternum stable; PW -->EPM VVI 40/10. MS CT and RT Pleural CT to Pleural VAC to LWS. Negative air leak.      Lungs: CTA B/L; Diminished at base B/L      Abdomen: soft, nontender, nondistended, positive bowel sounds, (+) Flatus; (+) BM (loose)    :  Voiding               Extremities:  B/L LE (+) trace edema; negative calf tenderness; (+) 2 DP palpable.  Right IJ intro with occlusive dressing C/D/I.  Left radial graft site with ACE wrap in place.  LLE SVG site  with ACE wrap in place.          acetaminophen  Tablet .. 650 milliGRAM(s) Oral every 6 hours  aMIOdarone Tablet 400 milliGRAM(s) Oral two times a day  amLODIPine Tablet 2.5 milliGRAM(s) Oral daily  ascorbic acid 500 milliGRAM(s) Oral two times a day  aspirin enteric coated 81 milliGRAM(s) Oral daily  atorvastatin 80 milliGRAM(s) Oral at bedtime  bisacodyl Suppository 10 milliGRAM(s) Rectal once  chlorhexidine 2% Cloths 1 Application(s) Topical daily  colchicine 0.6 milliGRAM(s) Oral two times a day  dextrose 5%. 1000 milliLiter(s) IV Continuous <Continuous>  dextrose 5%. 1000 milliLiter(s) IV Continuous <Continuous>  dextrose 50% Injectable 50 milliLiter(s) IV Push every 15 minutes  dextrose 50% Injectable 25 milliLiter(s) IV Push every 15 minutes  dextrose Oral Gel 15 Gram(s) Oral once PRN  enoxaparin Injectable 40 milliGRAM(s) SubCutaneous every 24 hours  gabapentin 100 milliGRAM(s) Oral every 8 hours  glucagon  Injectable 1 milliGRAM(s) IntraMuscular once  HYDROmorphone  Injectable 0.5 milliGRAM(s) IV Push every 6 hours PRN  insulin glargine Injectable (LANTUS) 40 Unit(s) SubCutaneous at bedtime  insulin lispro (ADMELOG) corrective regimen sliding scale   SubCutaneous Before meals and at bedtime  insulin lispro Injectable (ADMELOG) 13 Unit(s) SubCutaneous before dinner  insulin regular Infusion 4 Unit(s)/Hr IV Continuous <Continuous>  metoprolol tartrate 25 milliGRAM(s) Oral every 8 hours  oxyCODONE IR 5 milliGRAM(s) Oral every 4 hours PRN  oxyCODONE IR 10 milliGRAM(s) Oral every 4 hours PRN  pantoprazole Tablet 40 milliGRAM(s) Oral before breakfast  polyethylene glycol 3350 17 Gram(s) Oral two times a day  senna 2 Tablet(s) Oral at bedtime  sodium chloride 0.9%. 1000 milliLiter(s) IV Continuous <Continuous>    Physical Therapy Rec:   Home  [ X ]   Home w/ PT  [  ]  Rehab  [  ]    Discussed with Cardiothoracic Team at AM rounds.

## 2024-12-21 NOTE — PROGRESS NOTE ADULT - PROBLEM SELECTOR PLAN 1
Continue with ASA 81 mg PO Daily.   Continue with Lopressor 25 mg PO every 8 hours   Continue with Lipitor 80 mg PO HS    Continue on Norvasc 2.5 mg PO daily for radial graft   Continue on Colchicine 0.6 mg PO BID for PPS  Continue on ERP protocol Amio 400 mg PO BID x 3 days / Vitamin 500 mg PO BID x 5 days and Neurontin 100 mg PO TID x 5 days   Maintain PW to EPM VVI 40/10  Maintain MC and RT CT to pleural vac to LWS  Daily CXR   Increase activity as tolerated.   Encourage Chest PT / Pulmonary toileting and Incentive spirometry every 1 hour x 10 while awake.   Continue with Protonix 40 mg PO daily and Lovenox 40 mg SQ daily for PUD and DVT prophylaxis.   Sternal precautions and wound care  Shower on POD #5.   D/C plan home once medically cleared   Plan of care discussed with attending

## 2024-12-21 NOTE — PROGRESS NOTE ADULT - SUBJECTIVE AND OBJECTIVE BOX
Chief complaint    Patient is a 44y old  Male who presents with a chief complaint of s/p CABG (20 Dec 2024 09:54)   Review of systems  Patient appears comfortable.    Labs and Fingersticks  CAPILLARY BLOOD GLUCOSE  181 (20 Dec 2024 22:00)  146 (20 Dec 2024 17:00)  151 (20 Dec 2024 15:00)  215 (20 Dec 2024 14:00)  128 (20 Dec 2024 13:00)      POCT Blood Glucose.: 232 mg/dL (21 Dec 2024 11:57)  POCT Blood Glucose.: 267 mg/dL (21 Dec 2024 10:55)  POCT Blood Glucose.: 250 mg/dL (21 Dec 2024 10:05)  POCT Blood Glucose.: 301 mg/dL (21 Dec 2024 08:54)  POCT Blood Glucose.: 315 mg/dL (21 Dec 2024 08:15)  POCT Blood Glucose.: 289 mg/dL (21 Dec 2024 06:53)  POCT Blood Glucose.: 274 mg/dL (21 Dec 2024 06:52)  POCT Blood Glucose.: 181 mg/dL (20 Dec 2024 21:56)  POCT Blood Glucose.: 146 mg/dL (20 Dec 2024 16:49)  POCT Blood Glucose.: 151 mg/dL (20 Dec 2024 15:41)  POCT Blood Glucose.: 215 mg/dL (20 Dec 2024 13:51)  POCT Blood Glucose.: 120 mg/dL (20 Dec 2024 12:53)      Anion Gap: 11 (12-21 @ 00:36)  Anion Gap: 10 (12-20 @ 13:45)  Anion Gap: 13 (12-20 @ 01:11)  Anion Gap: 13 (12-19 @ 14:18)      Calcium: 9.1 (12-21 @ 00:36)  Calcium: 8.9 (12-20 @ 13:45)  Calcium: 8.5 (12-20 @ 01:11)  Calcium: 8.7 (12-19 @ 14:18)  Albumin: 4.0 (12-21 @ 00:36)  Albumin: 4.2 (12-20 @ 13:45)  Albumin: 4.3 (12-20 @ 01:11)  Albumin: 3.3 (12-19 @ 14:18)    Alanine Aminotransferase (ALT/SGPT): 18 (12-21 @ 00:36)  Alanine Aminotransferase (ALT/SGPT): 14 (12-20 @ 13:45)  Alanine Aminotransferase (ALT/SGPT): 13 (12-20 @ 01:11)  Alanine Aminotransferase (ALT/SGPT): 14 (12-19 @ 14:18)  Alkaline Phosphatase: 34 *L* (12-21 @ 00:36)  Alkaline Phosphatase: 32 *L* (12-20 @ 13:45)  Alkaline Phosphatase: 27 *L* (12-20 @ 01:11)  Alkaline Phosphatase: 33 *L* (12-19 @ 14:18)  Aspartate Aminotransferase (AST/SGOT): 20 (12-21 @ 00:36)  Aspartate Aminotransferase (AST/SGOT): 26 (12-20 @ 13:45)  Aspartate Aminotransferase (AST/SGOT): 28 (12-20 @ 01:11)  Aspartate Aminotransferase (AST/SGOT): 28 (12-19 @ 14:18)        12-21    141  |  105  |  17  ----------------------------<  184[H]  4.8   |  25  |  0.87    Ca    9.1      21 Dec 2024 00:36  Phos  2.3     12-21  Mg     2.4     12-21    TPro  5.8[L]  /  Alb  4.0  /  TBili  0.5  /  DBili  x   /  AST  20  /  ALT  18  /  AlkPhos  34[L]  12-21                        10.8   15.35 )-----------( 128      ( 21 Dec 2024 00:36 )             32.4     Medications  MEDICATIONS  (STANDING):  acetaminophen     Tablet .. 650 milliGRAM(s) Oral every 6 hours  aMIOdarone    Tablet 400 milliGRAM(s) Oral two times a day  amLODIPine   Tablet 2.5 milliGRAM(s) Oral daily  ascorbic acid 500 milliGRAM(s) Oral two times a day  aspirin enteric coated 81 milliGRAM(s) Oral daily  atorvastatin 80 milliGRAM(s) Oral at bedtime  bisacodyl Suppository 10 milliGRAM(s) Rectal once  chlorhexidine 2% Cloths 1 Application(s) Topical daily  colchicine 0.6 milliGRAM(s) Oral two times a day  dextrose 5%. 1000 milliLiter(s) (50 mL/Hr) IV Continuous <Continuous>  dextrose 5%. 1000 milliLiter(s) (100 mL/Hr) IV Continuous <Continuous>  dextrose 50% Injectable 25 milliLiter(s) IV Push every 15 minutes  dextrose 50% Injectable 50 milliLiter(s) IV Push every 15 minutes  enoxaparin Injectable 40 milliGRAM(s) SubCutaneous every 24 hours  gabapentin 100 milliGRAM(s) Oral every 8 hours  glucagon  Injectable 1 milliGRAM(s) IntraMuscular once  insulin glargine Injectable (LANTUS) 40 Unit(s) SubCutaneous at bedtime  insulin lispro (ADMELOG) corrective regimen sliding scale   SubCutaneous Before meals and at bedtime  insulin lispro Injectable (ADMELOG) 13 Unit(s) SubCutaneous before dinner  insulin regular Infusion 4 Unit(s)/Hr (4 mL/Hr) IV Continuous <Continuous>  metoprolol tartrate 25 milliGRAM(s) Oral every 8 hours  pantoprazole    Tablet 40 milliGRAM(s) Oral before breakfast  polyethylene glycol 3350 17 Gram(s) Oral two times a day  senna 2 Tablet(s) Oral at bedtime  sodium chloride 0.9%. 1000 milliLiter(s) (10 mL/Hr) IV Continuous <Continuous>      Physical Exam  General: Patient appears comfortable.  Vital Signs Last 12 Hrs  T(F): 98.2 (12-21-24 @ 08:00), Max: 98.2 (12-21-24 @ 08:00)  HR: 82 (12-21-24 @ 11:00) (73 - 82)  BP: 178/99 (12-21-24 @ 10:00) (123/59 - 178/99)  BP(mean): 132 (12-21-24 @ 10:00) (83 - 132)  RR: 27 (12-21-24 @ 11:00) (19 - 27)  SpO2: 95% (12-21-24 @ 11:00) (95% - 99%)  Neck: No palpable thyroid nodules.  CVS: S1S2, No murmurs  Respiratory: No wheezing, no crepitations  GI: Abdomen soft, non tender.    Diagnostics        Radiology:

## 2024-12-21 NOTE — PROGRESS NOTE ADULT - PROBLEM SELECTOR PLAN 2
Endo Following (Dr. Cortés)  Continue on current glycemic regimen of Lantus 40 units SQ at HS and pre meal Admelog 13 units SQ before dinner   Accuchecks 4 times a day AC and HS.  Cover with Admelog corrective scale   Continue with DASH/ Diabetic Diet Endo Following (Dr. Cortés)  Continue on Insulin gtt   Continue on current glycemic regimen of Lantus 40 units SQ at HS and pre meal Admelog 13 units SQ before dinner   Accuchecks 4 times a day AC and HS.  Cover with Admelog corrective scale   Continue with DASH/ Diabetic Diet

## 2024-12-22 LAB
ALBUMIN SERPL ELPH-MCNC: 3.8 G/DL — SIGNIFICANT CHANGE UP (ref 3.3–5)
ALP SERPL-CCNC: 45 U/L — SIGNIFICANT CHANGE UP (ref 40–120)
ALT FLD-CCNC: 34 U/L — SIGNIFICANT CHANGE UP (ref 10–45)
ANION GAP SERPL CALC-SCNC: 12 MMOL/L — SIGNIFICANT CHANGE UP (ref 5–17)
AST SERPL-CCNC: 38 U/L — SIGNIFICANT CHANGE UP (ref 10–40)
BASOPHILS # BLD AUTO: 0.02 K/UL — SIGNIFICANT CHANGE UP (ref 0–0.2)
BASOPHILS NFR BLD AUTO: 0.2 % — SIGNIFICANT CHANGE UP (ref 0–2)
BILIRUB SERPL-MCNC: 0.6 MG/DL — SIGNIFICANT CHANGE UP (ref 0.2–1.2)
BUN SERPL-MCNC: 21 MG/DL — SIGNIFICANT CHANGE UP (ref 7–23)
CALCIUM SERPL-MCNC: 8.6 MG/DL — SIGNIFICANT CHANGE UP (ref 8.4–10.5)
CHLORIDE SERPL-SCNC: 100 MMOL/L — SIGNIFICANT CHANGE UP (ref 96–108)
CO2 SERPL-SCNC: 26 MMOL/L — SIGNIFICANT CHANGE UP (ref 22–31)
CREAT SERPL-MCNC: 0.94 MG/DL — SIGNIFICANT CHANGE UP (ref 0.5–1.3)
EGFR: 102 ML/MIN/1.73M2 — SIGNIFICANT CHANGE UP
EOSINOPHIL # BLD AUTO: 0 K/UL — SIGNIFICANT CHANGE UP (ref 0–0.5)
EOSINOPHIL NFR BLD AUTO: 0 % — SIGNIFICANT CHANGE UP (ref 0–6)
GLUCOSE BLDC GLUCOMTR-MCNC: 148 MG/DL — HIGH (ref 70–99)
GLUCOSE BLDC GLUCOMTR-MCNC: 171 MG/DL — HIGH (ref 70–99)
GLUCOSE BLDC GLUCOMTR-MCNC: 184 MG/DL — HIGH (ref 70–99)
GLUCOSE BLDC GLUCOMTR-MCNC: 233 MG/DL — HIGH (ref 70–99)
GLUCOSE BLDC GLUCOMTR-MCNC: 279 MG/DL — HIGH (ref 70–99)
GLUCOSE BLDC GLUCOMTR-MCNC: 282 MG/DL — HIGH (ref 70–99)
GLUCOSE BLDC GLUCOMTR-MCNC: 66 MG/DL — LOW (ref 70–99)
GLUCOSE BLDC GLUCOMTR-MCNC: 67 MG/DL — LOW (ref 70–99)
GLUCOSE BLDC GLUCOMTR-MCNC: 72 MG/DL — SIGNIFICANT CHANGE UP (ref 70–99)
GLUCOSE SERPL-MCNC: 235 MG/DL — HIGH (ref 70–99)
HCT VFR BLD CALC: 34.5 % — LOW (ref 39–50)
HGB BLD-MCNC: 11.5 G/DL — LOW (ref 13–17)
IMM GRANULOCYTES NFR BLD AUTO: 0.3 % — SIGNIFICANT CHANGE UP (ref 0–0.9)
LYMPHOCYTES # BLD AUTO: 1.42 K/UL — SIGNIFICANT CHANGE UP (ref 1–3.3)
LYMPHOCYTES # BLD AUTO: 13.8 % — SIGNIFICANT CHANGE UP (ref 13–44)
MAGNESIUM SERPL-MCNC: 1.8 MG/DL — SIGNIFICANT CHANGE UP (ref 1.6–2.6)
MCHC RBC-ENTMCNC: 31.9 PG — SIGNIFICANT CHANGE UP (ref 27–34)
MCHC RBC-ENTMCNC: 33.3 G/DL — SIGNIFICANT CHANGE UP (ref 32–36)
MCV RBC AUTO: 95.6 FL — SIGNIFICANT CHANGE UP (ref 80–100)
MONOCYTES # BLD AUTO: 1.08 K/UL — HIGH (ref 0–0.9)
MONOCYTES NFR BLD AUTO: 10.5 % — SIGNIFICANT CHANGE UP (ref 2–14)
NEUTROPHILS # BLD AUTO: 7.76 K/UL — HIGH (ref 1.8–7.4)
NEUTROPHILS NFR BLD AUTO: 75.2 % — SIGNIFICANT CHANGE UP (ref 43–77)
NRBC # BLD: 0 /100 WBCS — SIGNIFICANT CHANGE UP (ref 0–0)
PHOSPHATE SERPL-MCNC: 1.8 MG/DL — LOW (ref 2.5–4.5)
PLATELET # BLD AUTO: 135 K/UL — LOW (ref 150–400)
POTASSIUM SERPL-MCNC: 4.2 MMOL/L — SIGNIFICANT CHANGE UP (ref 3.5–5.3)
POTASSIUM SERPL-SCNC: 4.2 MMOL/L — SIGNIFICANT CHANGE UP (ref 3.5–5.3)
PROT SERPL-MCNC: 5.9 G/DL — LOW (ref 6–8.3)
RBC # BLD: 3.61 M/UL — LOW (ref 4.2–5.8)
RBC # FLD: 12.6 % — SIGNIFICANT CHANGE UP (ref 10.3–14.5)
SODIUM SERPL-SCNC: 138 MMOL/L — SIGNIFICANT CHANGE UP (ref 135–145)
WBC # BLD: 10.31 K/UL — SIGNIFICANT CHANGE UP (ref 3.8–10.5)
WBC # FLD AUTO: 10.31 K/UL — SIGNIFICANT CHANGE UP (ref 3.8–10.5)

## 2024-12-22 PROCEDURE — 74018 RADEX ABDOMEN 1 VIEW: CPT | Mod: 26

## 2024-12-22 PROCEDURE — 71045 X-RAY EXAM CHEST 1 VIEW: CPT | Mod: 26

## 2024-12-22 RX ORDER — METOPROLOL TARTRATE 50 MG
25 TABLET ORAL ONCE
Refills: 0 | Status: COMPLETED | OUTPATIENT
Start: 2024-12-22 | End: 2024-12-22

## 2024-12-22 RX ORDER — INSULIN GLARGINE-YFGN 100 [IU]/ML
46 INJECTION, SOLUTION SUBCUTANEOUS AT BEDTIME
Refills: 0 | Status: DISCONTINUED | OUTPATIENT
Start: 2024-12-22 | End: 2024-12-23

## 2024-12-22 RX ORDER — METOPROLOL TARTRATE 50 MG
50 TABLET ORAL
Refills: 0 | Status: DISCONTINUED | OUTPATIENT
Start: 2024-12-22 | End: 2024-12-24

## 2024-12-22 RX ORDER — INSULIN LISPRO 100/ML
13 VIAL (ML) SUBCUTANEOUS
Refills: 0 | Status: DISCONTINUED | OUTPATIENT
Start: 2024-12-22 | End: 2024-12-23

## 2024-12-22 RX ADMIN — Medication 3: at 08:06

## 2024-12-22 RX ADMIN — GABAPENTIN 100 MILLIGRAM(S): 300 CAPSULE ORAL at 13:24

## 2024-12-22 RX ADMIN — GABAPENTIN 100 MILLIGRAM(S): 300 CAPSULE ORAL at 05:21

## 2024-12-22 RX ADMIN — AMIODARONE HYDROCHLORIDE 400 MILLIGRAM(S): 200 TABLET ORAL at 05:21

## 2024-12-22 RX ADMIN — Medication 25 MILLIGRAM(S): at 14:18

## 2024-12-22 RX ADMIN — GABAPENTIN 100 MILLIGRAM(S): 300 CAPSULE ORAL at 22:07

## 2024-12-22 RX ADMIN — Medication 81 MILLIGRAM(S): at 11:41

## 2024-12-22 RX ADMIN — COLCHICINE 0.6 MILLIGRAM(S): 0.6 CAPSULE ORAL at 05:21

## 2024-12-22 RX ADMIN — Medication 500 MILLIGRAM(S): at 05:21

## 2024-12-22 RX ADMIN — ACETAMINOPHEN 650 MILLIGRAM(S): 80 SOLUTION/ DROPS ORAL at 11:42

## 2024-12-22 RX ADMIN — ACETAMINOPHEN 650 MILLIGRAM(S): 80 SOLUTION/ DROPS ORAL at 05:21

## 2024-12-22 RX ADMIN — Medication 13 UNIT(S): at 11:40

## 2024-12-22 RX ADMIN — Medication 2: at 11:41

## 2024-12-22 RX ADMIN — ATORVASTATIN CALCIUM 80 MILLIGRAM(S): 40 TABLET, FILM COATED ORAL at 22:07

## 2024-12-22 RX ADMIN — Medication 500 MILLIGRAM(S): at 17:33

## 2024-12-22 RX ADMIN — Medication 2.5 MILLIGRAM(S): at 05:20

## 2024-12-22 RX ADMIN — Medication 25 MILLIGRAM(S): at 05:21

## 2024-12-22 RX ADMIN — PANTOPRAZOLE 40 MILLIGRAM(S): 40 TABLET, DELAYED RELEASE ORAL at 05:20

## 2024-12-22 RX ADMIN — Medication 13 UNIT(S): at 17:33

## 2024-12-22 RX ADMIN — Medication 50 MILLIGRAM(S): at 17:32

## 2024-12-22 RX ADMIN — Medication 25 MILLIGRAM(S): at 13:25

## 2024-12-22 RX ADMIN — CHLORHEXIDINE GLUCONATE 1 APPLICATION(S): 1.2 RINSE ORAL at 07:08

## 2024-12-22 RX ADMIN — ENOXAPARIN SODIUM 40 MILLIGRAM(S): 60 INJECTION INTRAVENOUS; SUBCUTANEOUS at 11:39

## 2024-12-22 RX ADMIN — ACETAMINOPHEN 650 MILLIGRAM(S): 80 SOLUTION/ DROPS ORAL at 00:55

## 2024-12-22 NOTE — PROGRESS NOTE ADULT - ASSESSMENT
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 Presents to [facility] transferred from [facility]. Initially presented in ED with c/o left sided chest pain which started at about 10.00 am the day prior to presenting to the hospital. Patient had 4 episodes of chest pain lasting about 30 seconds The 4th episode of left sided chest pain at about 9.00-9.30 pm that night. Patient and wife called 911, and he was BIBA to [place] ED. He received ASA in ambulance.  In the ED Troponin trending up: 99.14->184.77, EK bpm in NSR and no acute ST-T changes. 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 Transferred to [facility] CT Surgery Dr. Ibrahim for CABG eval.     Hospital Course:   24- VSS CAD- insulin pump in place - will consult endo to manage glucose- OR date TBD   s/p CABG x 3/ LIMA to LAD / Radial Artery with vein blas to PDA / SVG to Diag.   Post op Course:  Extubated POD # 0   Hypertension on Cardene gtt --> weaned off   Patient with severe anxiety requiring Precedex gtt.    Type 1 DM on insulin gtt. Endocrine following.  Pre op on insulin pump at home.    VSS; Transferred to SDU. Maintain MS and Right CT in place to LWS, likely to D/C in AM.  Continue with current medication regimen.  Insulin gtt per Endo.  Starting Lantus 40 units HS.  Continue on Colchicine 0.6 mg PO BID for PPS.      VSS will d/c CTs this am - & isolate pw's insulin gtt off - tight glycemic control  Disposition: Home once medically cleared.   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 Presents to [facility] transferred from [facility]. Initially presented in ED with c/o left sided chest pain which started at about 10.00 am the day prior to presenting to the hospital. Patient had 4 episodes of chest pain lasting about 30 seconds The 4th episode of left sided chest pain at about 9.00-9.30 pm that night. Patient and wife called 911, and he was BIBA to [place] ED. He received ASA in ambulance.  In the ED Troponin trending up: 99.14->184.77, EK bpm in NSR and no acute ST-T changes. 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 Transferred to [facility] CT Surgery Dr. Ibrahim for CABG eval.     Hospital Course:   24- VSS CAD- insulin pump in place - will consult endo to manage glucose- OR date TBD   s/p CABG x 3/ LIMA to LAD / Radial Artery with vein blas to PDA / SVG to Diag.   Post op Course:  Extubated POD # 0   Hypertension on Cardene gtt --> weaned off   Patient with severe anxiety requiring Precedex gtt.    Type 1 DM on insulin gtt. Endocrine following.  Pre op on insulin pump at home.    VSS; Transferred to SDU. Maintain MS and Right CT in place to LWS, likely to D/C in AM.  Continue with current medication regimen.  Insulin gtt per Endo.  Starting Lantus 40 units HS.  Continue on Colchicine 0.6 mg PO BID for PPS.      VSS will d/c CTs this am - & isolate pw's insulin gtt off - tight glycemic control  multiple soft bms laxatives d/c'd  abd distending + flatus   Disposition: Home once medically cleared.   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 Presents to [facility] transferred from [facility]. Initially presented in ED with c/o left sided chest pain which started at about 10.00 am the day prior to presenting to the hospital. Patient had 4 episodes of chest pain lasting about 30 seconds The 4th episode of left sided chest pain at about 9.00-9.30 pm that night. Patient and wife called 911, and he was BIBA to [place] ED. He received ASA in ambulance.  In the ED Troponin trending up: 99.14->184.77, EK bpm in NSR and no acute ST-T changes. 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 Transferred to [facility] CT Surgery Dr. Ibrahim for CABG eval.     Hospital Course:   24- VSS CAD- insulin pump in place - will consult endo to manage glucose- OR date TBD   s/p CABG x 3/ LIMA to LAD / Radial Artery with vein blas to PDA / SVG to Diag.   Post op Course:  Extubated POD # 0   Hypertension on Cardene gtt --> weaned off   Patient with severe anxiety requiring Precedex gtt.    Type 1 DM on insulin gtt. Endocrine following.  Pre op on insulin pump at home.    VSS; Transferred to SDU. Maintain MS and Right CT in place to LWS, likely to D/C in AM.  Continue with current medication regimen.  Insulin gtt per Endo.  Starting Lantus 40 units HS.  Continue on Colchicine 0.6 mg PO BID for PPS.      VSS will d/c CTs this am - & isolate pw's insulin gtt off - tight glycemic control  multiple soft bms laxatives d/c'd  abd distending + flatus - Colchicine d/c'd - pt received 2 doses of Colchicine.    Disposition: Home once medically cleared.

## 2024-12-22 NOTE — PROGRESS NOTE ADULT - PROBLEM SELECTOR PLAN 1
Will increase Lantus to 46 units at bed time.  Will increase Admelog to 13 units before each meal in addition to Admelog correction scale coverage.  Patient counseled for compliance with consistent low carb diet.  .

## 2024-12-22 NOTE — PROGRESS NOTE ADULT - PROBLEM SELECTOR PLAN 1
Continue with ASA 81 mg PO Daily.   Continue with Lopressor 25 mg PO every 8 hours   Continue with Lipitor 80 mg PO HS    Continue on Norvasc 2.5 mg PO daily for radial graft   Continue on Colchicine 0.6 mg PO BID for PPS  Continue on ERP protocol Amio 400 mg PO BID x 3 days / Vitamin 500 mg PO BID x 5 days and Neurontin 100 mg PO TID x 5 days   Maintain PW to EPM VVI 40/10  d/c cts 12/22  Daily CXR   Increase activity as tolerated.   Encourage Chest PT / Pulmonary toileting and Incentive spirometry every 1 hour x 10 while awake.   Continue with Protonix 40 mg PO daily and Lovenox 40 mg SQ daily for PUD and DVT prophylaxis.   Sternal precautions and wound care  Shower on POD #5.   D/C plan home once medically cleared   Plan of care discussed with attending Continue with ASA 81 mg PO Daily.   Continue with Lopressor 25 mg PO every 8 hours   Continue with Lipitor 80 mg PO HS    Continue on Norvasc 2.5 mg PO daily for radial graft   12/22 multple bms  - colchicine d/c'd   Continue on ERP protocol Amio 400 mg PO BID x 3 days / Vitamin 500 mg PO BID x 5 days and Neurontin 100 mg PO TID x 5 days    12/22 isolate pw  d/c cts 12/22  Daily CXR   Increase activity as tolerated.   Encourage Chest PT / Pulmonary toileting and Incentive spirometry every 1 hour x 10 while awake.   Continue with Protonix 40 mg PO daily and Lovenox 40 mg SQ daily for PUD and DVT prophylaxis.   Sternal precautions and wound care  Shower on POD #5.   D/C plan home once medically cleared   Plan of care discussed with attending

## 2024-12-22 NOTE — PROGRESS NOTE ADULT - ASSESSMENT
Assessment  DMT1: 44y Male with DM T1 with hyperglycemia, A1C 6.7% , was on  insulin pump at home, now for cabg surgery, uses omnipod pump with dexcom 7,  now postop on basal bolus insulin,  sugars high this morning.  Will need tight glycemic control for postop wound healing.   CAD: on medications, stable, monitored. cagb postop   HTN: on antihypertensive medications, monitored, asymptomatic.         Calos Cortés MD  Cell: [phone]  Office: [phone]

## 2024-12-22 NOTE — PROGRESS NOTE ADULT - PROBLEM SELECTOR PLAN 2
Endo Following (Dr. Cortés)  Continue on Insulin gtt   Continue on current glycemic regimen of Lantus 40 units SQ at HS and pre meal Admelog 13 units SQ before dinner   Accuchecks 4 times a day AC and HS.  Cover with Admelog corrective scale   Continue with DASH/ Diabetic Diet

## 2024-12-22 NOTE — PROGRESS NOTE ADULT - SUBJECTIVE AND OBJECTIVE BOX
Chief complaint    Patient is a 44y old  Male who presents with a chief complaint of sob (22 Dec 2024 06:41)   Review of systems  Patient appears comfortable.    Labs and Fingersticks  CAPILLARY BLOOD GLUCOSE      POCT Blood Glucose.: 279 mg/dL (22 Dec 2024 07:35)  POCT Blood Glucose.: 184 mg/dL (22 Dec 2024 03:02)  POCT Blood Glucose.: 114 mg/dL (21 Dec 2024 22:01)  POCT Blood Glucose.: 102 mg/dL (21 Dec 2024 19:10)  POCT Blood Glucose.: 90 mg/dL (21 Dec 2024 18:04)  POCT Blood Glucose.: 106 mg/dL (21 Dec 2024 17:05)  POCT Blood Glucose.: 144 mg/dL (21 Dec 2024 16:07)  POCT Blood Glucose.: 135 mg/dL (21 Dec 2024 15:10)  POCT Blood Glucose.: 160 mg/dL (21 Dec 2024 14:19)  POCT Blood Glucose.: 207 mg/dL (21 Dec 2024 13:08)  POCT Blood Glucose.: 232 mg/dL (21 Dec 2024 11:57)  POCT Blood Glucose.: 267 mg/dL (21 Dec 2024 10:55)  POCT Blood Glucose.: 250 mg/dL (21 Dec 2024 10:05)  POCT Blood Glucose.: 301 mg/dL (21 Dec 2024 08:54)      Anion Gap: 12 (12-22 @ 06:28)  Anion Gap: 11 (12-21 @ 00:36)  Anion Gap: 10 (12-20 @ 13:45)      Calcium: 8.6 (12-22 @ 06:28)  Calcium: 9.1 (12-21 @ 00:36)  Calcium: 8.9 (12-20 @ 13:45)  Albumin: 3.8 (12-22 @ 06:28)  Albumin: 4.0 (12-21 @ 00:36)  Albumin: 4.2 (12-20 @ 13:45)    Alanine Aminotransferase (ALT/SGPT): 34 (12-22 @ 06:28)  Alanine Aminotransferase (ALT/SGPT): 18 (12-21 @ 00:36)  Alanine Aminotransferase (ALT/SGPT): 14 (12-20 @ 13:45)  Alkaline Phosphatase: 45 (12-22 @ 06:28)  Alkaline Phosphatase: 34 *L* (12-21 @ 00:36)  Alkaline Phosphatase: 32 *L* (12-20 @ 13:45)  Aspartate Aminotransferase (AST/SGOT): 38 (12-22 @ 06:28)  Aspartate Aminotransferase (AST/SGOT): 20 (12-21 @ 00:36)  Aspartate Aminotransferase (AST/SGOT): 26 (12-20 @ 13:45)        12-22    138  |  100  |  21  ----------------------------<  235[H]  4.2   |  26  |  0.94    Ca    8.6      22 Dec 2024 06:28  Phos  1.8     12-22  Mg     1.8     12-22    TPro  5.9[L]  /  Alb  3.8  /  TBili  0.6  /  DBili  x   /  AST  38  /  ALT  34  /  AlkPhos  45  12-22                        11.5   10.31 )-----------( 135      ( 22 Dec 2024 06:28 )             34.5     Medications  MEDICATIONS  (STANDING):  acetaminophen     Tablet .. 650 milliGRAM(s) Oral every 6 hours  amLODIPine   Tablet 2.5 milliGRAM(s) Oral daily  ascorbic acid 500 milliGRAM(s) Oral two times a day  aspirin enteric coated 81 milliGRAM(s) Oral daily  atorvastatin 80 milliGRAM(s) Oral at bedtime  bisacodyl Suppository 10 milliGRAM(s) Rectal once  chlorhexidine 2% Cloths 1 Application(s) Topical daily  colchicine 0.6 milliGRAM(s) Oral two times a day  dextrose 5%. 1000 milliLiter(s) (50 mL/Hr) IV Continuous <Continuous>  dextrose 5%. 1000 milliLiter(s) (100 mL/Hr) IV Continuous <Continuous>  dextrose 50% Injectable 50 milliLiter(s) IV Push every 15 minutes  dextrose 50% Injectable 25 milliLiter(s) IV Push every 15 minutes  enoxaparin Injectable 40 milliGRAM(s) SubCutaneous every 24 hours  gabapentin 100 milliGRAM(s) Oral every 8 hours  glucagon  Injectable 1 milliGRAM(s) IntraMuscular once  insulin glargine Injectable (LANTUS) 46 Unit(s) SubCutaneous at bedtime  insulin lispro (ADMELOG) corrective regimen sliding scale   SubCutaneous Before meals and at bedtime  insulin lispro Injectable (ADMELOG) 13 Unit(s) SubCutaneous before dinner  insulin regular Infusion 4 Unit(s)/Hr (4 mL/Hr) IV Continuous <Continuous>  metoprolol tartrate 25 milliGRAM(s) Oral every 8 hours  pantoprazole    Tablet 40 milliGRAM(s) Oral before breakfast  sodium chloride 0.9%. 1000 milliLiter(s) (10 mL/Hr) IV Continuous <Continuous>      Physical Exam  General: Patient appears comfortable.  Vital Signs Last 12 Hrs  T(F): 98 (12-22-24 @ 07:48), Max: 99.2 (12-22-24 @ 03:16)  HR: 84 (12-22-24 @ 07:48) (84 - 96)  BP: 144/81 (12-22-24 @ 07:48) (136/80 - 162/85)  BP(mean): 106 (12-22-24 @ 07:48) (106 - 116)  RR: 18 (12-22-24 @ 07:48) (18 - 18)  SpO2: 97% (12-22-24 @ 07:48) (94% - 100%)  Neck: No palpable thyroid nodules.  CVS: S1S2, No murmurs  Respiratory: No wheezing, no crepitations  GI: Abdomen soft, non tender.    Diagnostics        Radiology:

## 2024-12-22 NOTE — PROGRESS NOTE ADULT - SUBJECTIVE AND OBJECTIVE BOX
VITAL SIGNS-Telemetry:  SR   Vital Signs Last 24 Hrs  T(C): 37.3 (24 @ 03:16), Max: 37.3 (24 @ 03:16)  T(F): 99.2 (24 @ 03:16), Max: 99.2 (24 @ 03:16)  HR: 94 (24 @ 05:18) (73 - 96)  BP: 148/83 (24 @ 05:18) (132/70 - 178/99)  RR: 18 (24 @ 03:16) (18 - 27)  SpO2: 94% (24 @ 03:16) (94% - 100%)          @ 07:01  -   @ 07:00  --------------------------------------------------------  IN: 1079.6 mL / OUT: 1730 mL / NET: -650.4 mL     @ 07:01  -   @ 06:41  --------------------------------------------------------  IN: 1331.4 mL / OUT: 1415 mL / NET: -83.6 mL    Daily     Daily Weight in k.8 (22 Dec 2024 05:18)    CAPILLARY BLOOD GLUCOSE  POCT Blood Glucose.: 184 mg/dL (22 Dec 2024 03:02)  POCT Blood Glucose.: 114 mg/dL (21 Dec 2024 22:01)  POCT Blood Glucose.: 102 mg/dL (21 Dec 2024 19:10)  POCT Blood Glucose.: 90 mg/dL (21 Dec 2024 18:04)  POCT Blood Glucose.: 106 mg/dL (21 Dec 2024 17:05)        Drains:     MS         [ x ] Drainage:   20/50cc              L Pleural  [ x ]  Drainage:   5/15cc  Pacing Wires        [ x ]   Settings:   40/10                               Isolated  [  ]    PHYSICAL EXAM:  Neurology: alert and oriented x 3, nonfocal, no gross deficits  CV : S1S2  Sternal Wound :  CDI , Stable  Lungs: cts  Abdomen: soft, nontender, nondistended, positive bowel sounds, last bowel movement       preop  Extremities:     no calf tenderness  no calf tenderness  LUE inc cdi.     acetaminophen     Tablet .. 650 milliGRAM(s) Oral every 6 hours  acetaminophen     Tablet .. 650 milliGRAM(s) Oral every 6 hours PRN  amLODIPine   Tablet 2.5 milliGRAM(s) Oral daily  ascorbic acid 500 milliGRAM(s) Oral two times a day  aspirin enteric coated 81 milliGRAM(s) Oral daily  atorvastatin 80 milliGRAM(s) Oral at bedtime  bisacodyl Suppository 10 milliGRAM(s) Rectal once  chlorhexidine 2% Cloths 1 Application(s) Topical daily  colchicine 0.6 milliGRAM(s) Oral two times a day  dextrose 5%. 1000 milliLiter(s) IV Continuous <Continuous>  dextrose 5%. 1000 milliLiter(s) IV Continuous <Continuous>  dextrose 50% Injectable 50 milliLiter(s) IV Push every 15 minutes  dextrose 50% Injectable 25 milliLiter(s) IV Push every 15 minutes  dextrose Oral Gel 15 Gram(s) Oral once PRN  enoxaparin Injectable 40 milliGRAM(s) SubCutaneous every 24 hours  gabapentin 100 milliGRAM(s) Oral every 8 hours  glucagon  Injectable 1 milliGRAM(s) IntraMuscular once  insulin glargine Injectable (LANTUS) 40 Unit(s) SubCutaneous at bedtime  insulin lispro (ADMELOG) corrective regimen sliding scale   SubCutaneous Before meals and at bedtime  insulin lispro Injectable (ADMELOG) 13 Unit(s) SubCutaneous before dinner  insulin regular Infusion 4 Unit(s)/Hr IV Continuous <Continuous>  metoprolol tartrate 25 milliGRAM(s) Oral every 8 hours  oxyCODONE    IR 5 milliGRAM(s) Oral every 4 hours PRN  oxyCODONE    IR 10 milliGRAM(s) Oral every 4 hours PRN  pantoprazole    Tablet 40 milliGRAM(s) Oral before breakfast  sodium chloride 0.9%. 1000 milliLiter(s) IV Continuous <Continuous>    Physical Therapy Rec:   Home  [ x ]   Home w/ PT  [  ]  Rehab  [  ]  Discussed with Cardiothoracic Team at AM rounds. VITAL SIGNS-Telemetry:  SR   Vital Signs Last 24 Hrs  T(C): 37.3 (24 @ 03:16), Max: 37.3 (24 @ 03:16)  T(F): 99.2 (24 @ 03:16), Max: 99.2 (24 @ 03:16)  HR: 94 (24 @ 05:18) (73 - 96)  BP: 148/83 (24 @ 05:18) (132/70 - 178/99)  RR: 18 (24 @ 03:16) (18 - 27)  SpO2: 94% (24 @ 03:16) (94% - 100%)          @ 07:01  -   @ 07:00  --------------------------------------------------------  IN: 1079.6 mL / OUT: 1730 mL / NET: -650.4 mL     @ 07:01  -   @ 06:41  --------------------------------------------------------  IN: 1331.4 mL / OUT: 1415 mL / NET: -83.6 mL    Daily     Daily Weight in k.8 (22 Dec 2024 05:18)    CAPILLARY BLOOD GLUCOSE  POCT Blood Glucose.: 184 mg/dL (22 Dec 2024 03:02)  POCT Blood Glucose.: 114 mg/dL (21 Dec 2024 22:01)  POCT Blood Glucose.: 102 mg/dL (21 Dec 2024 19:10)  POCT Blood Glucose.: 90 mg/dL (21 Dec 2024 18:04)  POCT Blood Glucose.: 106 mg/dL (21 Dec 2024 17:05)        Drains:     MS         [ x ] Drainage:   20/50cc              L Pleural  [ x ]  Drainage:   5/15cc  Pacing Wires        [ x ]   Settings:   40/10                               Isolated  [  ]    PHYSICAL EXAM:  Neurology: alert and oriented x 3, nonfocal, no gross deficits  CV : S1S2  Sternal Wound :  CDI , Stable  Lungs: cta  Abdomen: soft, nontender, slightly distended, positive bowel sounds, last bowel movement   mult bms  +flatus  Extremities:     no edema  no calf tenderness  LUE inc cdi.     acetaminophen     Tablet .. 650 milliGRAM(s) Oral every 6 hours  acetaminophen     Tablet .. 650 milliGRAM(s) Oral every 6 hours PRN  amLODIPine   Tablet 2.5 milliGRAM(s) Oral daily  ascorbic acid 500 milliGRAM(s) Oral two times a day  aspirin enteric coated 81 milliGRAM(s) Oral daily  atorvastatin 80 milliGRAM(s) Oral at bedtime  bisacodyl Suppository 10 milliGRAM(s) Rectal once  chlorhexidine 2% Cloths 1 Application(s) Topical daily  colchicine 0.6 milliGRAM(s) Oral two times a day  dextrose 5%. 1000 milliLiter(s) IV Continuous <Continuous>  dextrose 5%. 1000 milliLiter(s) IV Continuous <Continuous>  dextrose 50% Injectable 50 milliLiter(s) IV Push every 15 minutes  dextrose 50% Injectable 25 milliLiter(s) IV Push every 15 minutes  dextrose Oral Gel 15 Gram(s) Oral once PRN  enoxaparin Injectable 40 milliGRAM(s) SubCutaneous every 24 hours  gabapentin 100 milliGRAM(s) Oral every 8 hours  glucagon  Injectable 1 milliGRAM(s) IntraMuscular once  insulin glargine Injectable (LANTUS) 40 Unit(s) SubCutaneous at bedtime  insulin lispro (ADMELOG) corrective regimen sliding scale   SubCutaneous Before meals and at bedtime  insulin lispro Injectable (ADMELOG) 13 Unit(s) SubCutaneous before dinner  insulin regular Infusion 4 Unit(s)/Hr IV Continuous <Continuous>  metoprolol tartrate 25 milliGRAM(s) Oral every 8 hours  oxyCODONE    IR 5 milliGRAM(s) Oral every 4 hours PRN  oxyCODONE    IR 10 milliGRAM(s) Oral every 4 hours PRN  pantoprazole    Tablet 40 milliGRAM(s) Oral before breakfast  sodium chloride 0.9%. 1000 milliLiter(s) IV Continuous <Continuous>    Physical Therapy Rec:   Home  [ x ]   Home w/ PT  [  ]  Rehab  [  ]  Discussed with Cardiothoracic Team at AM rounds. VITAL SIGNS-Telemetry:  SR   Vital Signs Last 24 Hrs  T(C): 37.3 (24 @ 03:16), Max: 37.3 (24 @ 03:16)  T(F): 99.2 (24 @ 03:16), Max: 99.2 (24 @ 03:16)  HR: 94 (24 @ 05:18) (73 - 96)  BP: 148/83 (24 @ 05:18) (132/70 - 178/99)  RR: 18 (24 @ 03:16) (18 - 27)  SpO2: 94% (24 @ 03:16) (94% - 100%)          @ 07:01  -   @ 07:00  --------------------------------------------------------  IN: 1079.6 mL / OUT: 1730 mL / NET: -650.4 mL     @ 07:01  -   @ 06:41  --------------------------------------------------------  IN: 1331.4 mL / OUT: 1415 mL / NET: -83.6 mL    Daily     Daily Weight in k.8 (22 Dec 2024 05:18)    CAPILLARY BLOOD GLUCOSE  POCT Blood Glucose.: 184 mg/dL (22 Dec 2024 03:02)  POCT Blood Glucose.: 114 mg/dL (21 Dec 2024 22:01)  POCT Blood Glucose.: 102 mg/dL (21 Dec 2024 19:10)  POCT Blood Glucose.: 90 mg/dL (21 Dec 2024 18:04)  POCT Blood Glucose.: 106 mg/dL (21 Dec 2024 17:05)        Pacing Wires        [ x ]   Settings:   40/10                               Isolated  [  ]    PHYSICAL EXAM:  Neurology: alert and oriented x 3, nonfocal, no gross deficits  CV : S1S2  Sternal Wound :  CDI , Stable  Lungs: cta  Abdomen: soft, nontender, slightly distended, positive bowel sounds, last bowel movement   mult bms  +flatus  Extremities:     no edema  no calf tenderness  LUE inc cdi.     acetaminophen     Tablet .. 650 milliGRAM(s) Oral every 6 hours  acetaminophen     Tablet .. 650 milliGRAM(s) Oral every 6 hours PRN  amLODIPine   Tablet 2.5 milliGRAM(s) Oral daily  ascorbic acid 500 milliGRAM(s) Oral two times a day  aspirin enteric coated 81 milliGRAM(s) Oral daily  atorvastatin 80 milliGRAM(s) Oral at bedtime  bisacodyl Suppository 10 milliGRAM(s) Rectal once  chlorhexidine 2% Cloths 1 Application(s) Topical daily  colchicine 0.6 milliGRAM(s) Oral two times a day  dextrose 5%. 1000 milliLiter(s) IV Continuous <Continuous>  dextrose 5%. 1000 milliLiter(s) IV Continuous <Continuous>  dextrose 50% Injectable 50 milliLiter(s) IV Push every 15 minutes  dextrose 50% Injectable 25 milliLiter(s) IV Push every 15 minutes  dextrose Oral Gel 15 Gram(s) Oral once PRN  enoxaparin Injectable 40 milliGRAM(s) SubCutaneous every 24 hours  gabapentin 100 milliGRAM(s) Oral every 8 hours  glucagon  Injectable 1 milliGRAM(s) IntraMuscular once  insulin glargine Injectable (LANTUS) 40 Unit(s) SubCutaneous at bedtime  insulin lispro (ADMELOG) corrective regimen sliding scale   SubCutaneous Before meals and at bedtime  insulin lispro Injectable (ADMELOG) 13 Unit(s) SubCutaneous before dinner  insulin regular Infusion 4 Unit(s)/Hr IV Continuous <Continuous>  metoprolol tartrate 25 milliGRAM(s) Oral every 8 hours  oxyCODONE    IR 5 milliGRAM(s) Oral every 4 hours PRN  oxyCODONE    IR 10 milliGRAM(s) Oral every 4 hours PRN  pantoprazole    Tablet 40 milliGRAM(s) Oral before breakfast  sodium chloride 0.9%. 1000 milliLiter(s) IV Continuous <Continuous>    Physical Therapy Rec:   Home  [ x ]   Home w/ PT  [  ]  Rehab  [  ]  Discussed with Cardiothoracic Team at AM rounds.

## 2024-12-22 NOTE — CHART NOTE - NSCHARTNOTEFT_GEN_A_CORE
Fingerstick 64, PO glucose intervention with resulting glucose of 67  agreeable to take PO--will recheck at 2230 if no increased will administer D50   Holding lantus for now Pmh: Type I Diabetes    Fingerstick 64, PO glucose intervention with resulting glucose of 67  agreeable to take PO--will recheck at Midnight  Holding lantus for now, will administer after midnight fingerstick

## 2024-12-23 LAB
ANION GAP SERPL CALC-SCNC: 10 MMOL/L — SIGNIFICANT CHANGE UP (ref 5–17)
BUN SERPL-MCNC: 15 MG/DL — SIGNIFICANT CHANGE UP (ref 7–23)
CALCIUM SERPL-MCNC: 9 MG/DL — SIGNIFICANT CHANGE UP (ref 8.4–10.5)
CHLORIDE SERPL-SCNC: 99 MMOL/L — SIGNIFICANT CHANGE UP (ref 96–108)
CO2 SERPL-SCNC: 29 MMOL/L — SIGNIFICANT CHANGE UP (ref 22–31)
CREAT SERPL-MCNC: 1.02 MG/DL — SIGNIFICANT CHANGE UP (ref 0.5–1.3)
EGFR: 92 ML/MIN/1.73M2 — SIGNIFICANT CHANGE UP
GLUCOSE BLDC GLUCOMTR-MCNC: 120 MG/DL — HIGH (ref 70–99)
GLUCOSE BLDC GLUCOMTR-MCNC: 127 MG/DL — HIGH (ref 70–99)
GLUCOSE BLDC GLUCOMTR-MCNC: 138 MG/DL — HIGH (ref 70–99)
GLUCOSE BLDC GLUCOMTR-MCNC: 211 MG/DL — HIGH (ref 70–99)
GLUCOSE BLDC GLUCOMTR-MCNC: 223 MG/DL — HIGH (ref 70–99)
GLUCOSE BLDC GLUCOMTR-MCNC: 258 MG/DL — HIGH (ref 70–99)
GLUCOSE BLDC GLUCOMTR-MCNC: 264 MG/DL — HIGH (ref 70–99)
GLUCOSE BLDC GLUCOMTR-MCNC: 269 MG/DL — HIGH (ref 70–99)
GLUCOSE BLDC GLUCOMTR-MCNC: 310 MG/DL — HIGH (ref 70–99)
GLUCOSE BLDC GLUCOMTR-MCNC: 89 MG/DL — SIGNIFICANT CHANGE UP (ref 70–99)
GLUCOSE BLDC GLUCOMTR-MCNC: 91 MG/DL — SIGNIFICANT CHANGE UP (ref 70–99)
GLUCOSE BLDC GLUCOMTR-MCNC: 98 MG/DL — SIGNIFICANT CHANGE UP (ref 70–99)
GLUCOSE SERPL-MCNC: 293 MG/DL — HIGH (ref 70–99)
HCT VFR BLD CALC: 36.8 % — LOW (ref 39–50)
HGB BLD-MCNC: 12.5 G/DL — LOW (ref 13–17)
MAGNESIUM SERPL-MCNC: 1.8 MG/DL — SIGNIFICANT CHANGE UP (ref 1.6–2.6)
MCHC RBC-ENTMCNC: 31.9 PG — SIGNIFICANT CHANGE UP (ref 27–34)
MCHC RBC-ENTMCNC: 34 G/DL — SIGNIFICANT CHANGE UP (ref 32–36)
MCV RBC AUTO: 93.9 FL — SIGNIFICANT CHANGE UP (ref 80–100)
NRBC # BLD: 0 /100 WBCS — SIGNIFICANT CHANGE UP (ref 0–0)
PHOSPHATE SERPL-MCNC: 2.1 MG/DL — LOW (ref 2.5–4.5)
PLATELET # BLD AUTO: 175 K/UL — SIGNIFICANT CHANGE UP (ref 150–400)
POTASSIUM SERPL-MCNC: 4.1 MMOL/L — SIGNIFICANT CHANGE UP (ref 3.5–5.3)
POTASSIUM SERPL-SCNC: 4.1 MMOL/L — SIGNIFICANT CHANGE UP (ref 3.5–5.3)
RBC # BLD: 3.92 M/UL — LOW (ref 4.2–5.8)
RBC # FLD: 12.3 % — SIGNIFICANT CHANGE UP (ref 10.3–14.5)
SODIUM SERPL-SCNC: 138 MMOL/L — SIGNIFICANT CHANGE UP (ref 135–145)
WBC # BLD: 7.52 K/UL — SIGNIFICANT CHANGE UP (ref 3.8–10.5)
WBC # FLD AUTO: 7.52 K/UL — SIGNIFICANT CHANGE UP (ref 3.8–10.5)

## 2024-12-23 PROCEDURE — 71045 X-RAY EXAM CHEST 1 VIEW: CPT | Mod: 26

## 2024-12-23 RX ORDER — MAGNESIUM SULFATE 500 MG/ML
2 INJECTION, SOLUTION INTRAMUSCULAR; INTRAVENOUS ONCE
Refills: 0 | Status: COMPLETED | OUTPATIENT
Start: 2024-12-23 | End: 2024-12-23

## 2024-12-23 RX ORDER — INSULIN LISPRO 100/ML
11 VIAL (ML) SUBCUTANEOUS
Refills: 0 | Status: DISCONTINUED | OUTPATIENT
Start: 2024-12-23 | End: 2024-12-23

## 2024-12-23 RX ORDER — INSULIN LISPRO 100/ML
8 VIAL (ML) SUBCUTANEOUS ONCE
Refills: 0 | Status: DISCONTINUED | OUTPATIENT
Start: 2024-12-23 | End: 2024-12-23

## 2024-12-23 RX ORDER — INSULIN GLARGINE-YFGN 100 [IU]/ML
38 INJECTION, SOLUTION SUBCUTANEOUS AT BEDTIME
Refills: 0 | Status: DISCONTINUED | OUTPATIENT
Start: 2024-12-23 | End: 2024-12-23

## 2024-12-23 RX ORDER — INSULIN LISPRO 100/ML
1 VIAL (ML) SUBCUTANEOUS
Refills: 0 | Status: DISCONTINUED | OUTPATIENT
Start: 2024-12-23 | End: 2024-12-24

## 2024-12-23 RX ORDER — INSULIN LISPRO 100/ML
9 VIAL (ML) SUBCUTANEOUS
Refills: 0 | Status: DISCONTINUED | OUTPATIENT
Start: 2024-12-23 | End: 2024-12-23

## 2024-12-23 RX ADMIN — Medication 4: at 08:01

## 2024-12-23 RX ADMIN — ATORVASTATIN CALCIUM 80 MILLIGRAM(S): 40 TABLET, FILM COATED ORAL at 21:24

## 2024-12-23 RX ADMIN — Medication 81 MILLIGRAM(S): at 10:32

## 2024-12-23 RX ADMIN — INSULIN GLARGINE-YFGN 46 UNIT(S): 100 INJECTION, SOLUTION SUBCUTANEOUS at 00:20

## 2024-12-23 RX ADMIN — Medication 50 MILLIGRAM(S): at 17:17

## 2024-12-23 RX ADMIN — CHLORHEXIDINE GLUCONATE 1 APPLICATION(S): 1.2 RINSE ORAL at 10:26

## 2024-12-23 RX ADMIN — Medication 9 UNIT(S): at 08:02

## 2024-12-23 RX ADMIN — PANTOPRAZOLE 40 MILLIGRAM(S): 40 TABLET, DELAYED RELEASE ORAL at 06:10

## 2024-12-23 RX ADMIN — ENOXAPARIN SODIUM 40 MILLIGRAM(S): 60 INJECTION INTRAVENOUS; SUBCUTANEOUS at 09:17

## 2024-12-23 RX ADMIN — GABAPENTIN 100 MILLIGRAM(S): 300 CAPSULE ORAL at 21:23

## 2024-12-23 RX ADMIN — GABAPENTIN 100 MILLIGRAM(S): 300 CAPSULE ORAL at 06:01

## 2024-12-23 RX ADMIN — MAGNESIUM SULFATE 25 GRAM(S): 500 INJECTION, SOLUTION INTRAMUSCULAR; INTRAVENOUS at 09:14

## 2024-12-23 RX ADMIN — Medication 500 MILLIGRAM(S): at 06:01

## 2024-12-23 RX ADMIN — Medication 50 MILLIGRAM(S): at 06:02

## 2024-12-23 RX ADMIN — GABAPENTIN 100 MILLIGRAM(S): 300 CAPSULE ORAL at 14:11

## 2024-12-23 RX ADMIN — Medication 3: at 11:41

## 2024-12-23 RX ADMIN — Medication 11 UNIT(S): at 11:42

## 2024-12-23 RX ADMIN — Medication 500 MILLIGRAM(S): at 17:17

## 2024-12-23 RX ADMIN — Medication 2.5 MILLIGRAM(S): at 06:01

## 2024-12-23 NOTE — PROGRESS NOTE ADULT - NS ATTEND OPT1 GEN_ALL_CORE
Umberto
I attest my time as attending is greater than 50% of the total combined time spent on qualifying patient care activities by the PA/NP and attending.
I attest my time as attending is greater than 50% of the total combined time spent on qualifying patient care activities by the PA/NP and attending.

## 2024-12-23 NOTE — OCCUPATIONAL THERAPY INITIAL EVALUATION ADULT - STANDING BALANCE: DYNAMIC, REHAB EVAL
Yes, inhaled steroids may cause voice changes and throat irritation. Some are less likely to do this than others. good balance

## 2024-12-23 NOTE — OCCUPATIONAL THERAPY INITIAL EVALUATION ADULT - PERTINENT HX OF CURRENT PROBLEM, REHAB EVAL
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 Presents to [facility] transferred from [facility]. Initially presented in ED with c/o left sided chest pain which started at about 10.00 am the day prior to presenting to the hospital. Patient had 4 episodes of chest pain lasting about 30 seconds The 4th episode of left sided chest pain at about 9.00-9.30 pm that night. Patient and wife called 911, and he was BIBA to [place] ED. He received ASA in ambulance.  In the ED Troponin trending up: 99.14->184.77, EK bpm in NSR and no acute ST-T changes. 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 Transferred to [facility] CT Surgery Dr. Ibrahim for CABG eval.     Hospital Course:    s/p CABG x 3/ LIMA to LAD / Radial Artery with vein blas to PDA / SVG to Diag.

## 2024-12-23 NOTE — PROGRESS NOTE ADULT - SUBJECTIVE AND OBJECTIVE BOX
Chief complaint  Patient is a 44y old  Male who presents with a chief complaint of sob (22 Dec 2024 06:41)         Labs and Fingersticks  CAPILLARY BLOOD GLUCOSE      POCT Blood Glucose.: 98 mg/dL (23 Dec 2024 15:18)  POCT Blood Glucose.: 138 mg/dL (23 Dec 2024 14:14)  POCT Blood Glucose.: 223 mg/dL (23 Dec 2024 12:51)  POCT Blood Glucose.: 258 mg/dL (23 Dec 2024 11:15)  POCT Blood Glucose.: 264 mg/dL (23 Dec 2024 10:30)  POCT Blood Glucose.: 310 mg/dL (23 Dec 2024 07:33)  POCT Blood Glucose.: 269 mg/dL (23 Dec 2024 05:57)  POCT Blood Glucose.: 211 mg/dL (23 Dec 2024 00:10)  POCT Blood Glucose.: 72 mg/dL (22 Dec 2024 21:44)  POCT Blood Glucose.: 67 mg/dL (22 Dec 2024 21:25)  POCT Blood Glucose.: 66 mg/dL (22 Dec 2024 21:24)  POCT Blood Glucose.: 148 mg/dL (22 Dec 2024 16:39)      Anion Gap: 10 (12-23 @ 06:23)  Anion Gap: 12 (12-22 @ 06:28)      Calcium: 9.0 (12-23 @ 06:23)  Calcium: 8.6 (12-22 @ 06:28)  Albumin: 3.8 (12-22 @ 06:28)    Alanine Aminotransferase (ALT/SGPT): 34 (12-22 @ 06:28)  Alkaline Phosphatase: 45 (12-22 @ 06:28)  Aspartate Aminotransferase (AST/SGOT): 38 (12-22 @ 06:28)        12-23    138  |  99  |  15  ----------------------------<  293[H]  4.1   |  29  |  1.02    Ca    9.0      23 Dec 2024 06:23  Phos  2.1     12-23  Mg     1.8     12-23    TPro  5.9[L]  /  Alb  3.8  /  TBili  0.6  /  DBili  x   /  AST  38  /  ALT  34  /  AlkPhos  45  12-22                        12.5   7.52  )-----------( 175      ( 23 Dec 2024 06:23 )             36.8     Medications  MEDICATIONS  (STANDING):  amLODIPine   Tablet 2.5 milliGRAM(s) Oral daily  ascorbic acid 500 milliGRAM(s) Oral two times a day  aspirin enteric coated 81 milliGRAM(s) Oral daily  atorvastatin 80 milliGRAM(s) Oral at bedtime  chlorhexidine 2% Cloths 1 Application(s) Topical daily  dextrose 5%. 1000 milliLiter(s) (50 mL/Hr) IV Continuous <Continuous>  dextrose 5%. 1000 milliLiter(s) (100 mL/Hr) IV Continuous <Continuous>  dextrose 50% Injectable 50 milliLiter(s) IV Push every 15 minutes  dextrose 50% Injectable 25 milliLiter(s) IV Push every 15 minutes  enoxaparin Injectable 40 milliGRAM(s) SubCutaneous every 24 hours  gabapentin 100 milliGRAM(s) Oral every 8 hours  glucagon  Injectable 1 milliGRAM(s) IntraMuscular once  insulin lispro (HumaLOG) Pump 1 Each SubCutaneous Continuous Pump  metoprolol tartrate 50 milliGRAM(s) Oral two times a day  pantoprazole    Tablet 40 milliGRAM(s) Oral before breakfast  sodium chloride 0.9%. 1000 milliLiter(s) (10 mL/Hr) IV Continuous <Continuous>      Physical Exam  General: Patient comfortable in bed   Vital Signs Last 12 Hrs  T(F): 97.8 (12-23-24 @ 15:22), Max: 98.6 (12-23-24 @ 05:59)  HR: 106 (12-23-24 @ 15:22) (84 - 106)  BP: 122/76 (12-23-24 @ 15:22) (115/73 - 137/83)  BP(mean): 94 (12-23-24 @ 15:22) (88 - 104)  RR: 21 (12-23-24 @ 15:22) (18 - 21)  SpO2: 97% (12-23-24 @ 15:22) (97% - 99%)    CVS: S1S2   Respiratory: No wheezing, no crepitations  GI: Abdomen soft, bowel sounds positive  Musculoskeletal:  moves all extremities       Chief complaint  Patient is a 44y old  Male who presents with a chief complaint of sob (22 Dec 2024 06:41)         Labs and Fingersticks  CAPILLARY BLOOD GLUCOSE      POCT Blood Glucose.: 98 mg/dL (23 Dec 2024 15:18)  POCT Blood Glucose.: 138 mg/dL (23 Dec 2024 14:14)  POCT Blood Glucose.: 223 mg/dL (23 Dec 2024 12:51)  POCT Blood Glucose.: 258 mg/dL (23 Dec 2024 11:15)  POCT Blood Glucose.: 264 mg/dL (23 Dec 2024 10:30)  POCT Blood Glucose.: 310 mg/dL (23 Dec 2024 07:33)  POCT Blood Glucose.: 269 mg/dL (23 Dec 2024 05:57)  POCT Blood Glucose.: 211 mg/dL (23 Dec 2024 00:10)  POCT Blood Glucose.: 72 mg/dL (22 Dec 2024 21:44)  POCT Blood Glucose.: 67 mg/dL (22 Dec 2024 21:25)  POCT Blood Glucose.: 66 mg/dL (22 Dec 2024 21:24)  POCT Blood Glucose.: 148 mg/dL (22 Dec 2024 16:39)      Anion Gap: 10 (12-23 @ 06:23)  Anion Gap: 12 (12-22 @ 06:28)      Calcium: 9.0 (12-23 @ 06:23)  Calcium: 8.6 (12-22 @ 06:28)  Albumin: 3.8 (12-22 @ 06:28)    Alanine Aminotransferase (ALT/SGPT): 34 (12-22 @ 06:28)  Alkaline Phosphatase: 45 (12-22 @ 06:28)  Aspartate Aminotransferase (AST/SGOT): 38 (12-22 @ 06:28)        12-23    138  |  99  |  15  ----------------------------<  293[H]  4.1   |  29  |  1.02    Ca    9.0      23 Dec 2024 06:23  Phos  2.1     12-23  Mg     1.8     12-23    TPro  5.9[L]  /  Alb  3.8  /  TBili  0.6  /  DBili  x   /  AST  38  /  ALT  34  /  AlkPhos  45  12-22                        12.5   7.52  )-----------( 175      ( 23 Dec 2024 06:23 )             36.8     Medications  MEDICATIONS  (STANDING):  amLODIPine   Tablet 2.5 milliGRAM(s) Oral daily  ascorbic acid 500 milliGRAM(s) Oral two times a day  aspirin enteric coated 81 milliGRAM(s) Oral daily  atorvastatin 80 milliGRAM(s) Oral at bedtime  chlorhexidine 2% Cloths 1 Application(s) Topical daily  dextrose 5%. 1000 milliLiter(s) (50 mL/Hr) IV Continuous <Continuous>  dextrose 5%. 1000 milliLiter(s) (100 mL/Hr) IV Continuous <Continuous>  dextrose 50% Injectable 50 milliLiter(s) IV Push every 15 minutes  dextrose 50% Injectable 25 milliLiter(s) IV Push every 15 minutes  enoxaparin Injectable 40 milliGRAM(s) SubCutaneous every 24 hours  gabapentin 100 milliGRAM(s) Oral every 8 hours  glucagon  Injectable 1 milliGRAM(s) IntraMuscular once  insulin lispro (HumaLOG) Pump 1 Each SubCutaneous Continuous Pump  metoprolol tartrate 50 milliGRAM(s) Oral two times a day  pantoprazole    Tablet 40 milliGRAM(s) Oral before breakfast  sodium chloride 0.9%. 1000 milliLiter(s) (10 mL/Hr) IV Continuous <Continuous>      Physical Exam  General: Patient comfortable in bed   Vital Signs Last 12 Hrs  T(F): 97.8 (12-23-24 @ 15:22), Max: 98.6 (12-23-24 @ 05:59)  HR: 106 (12-23-24 @ 15:22) (84 - 106)  BP: 122/76 (12-23-24 @ 15:22) (115/73 - 137/83)  BP(mean): 94 (12-23-24 @ 15:22) (88 - 104)  RR: 21 (12-23-24 @ 15:22) (18 - 21)  SpO2: 97% (12-23-24 @ 15:22) (97% - 99%)    CVS: S1S2   Respiratory: No wheezing, no crepitations  GI: Abdomen soft, bowel sounds positive  Musculoskeletal:  moves all extremities

## 2024-12-23 NOTE — PROGRESS NOTE ADULT - ASSESSMENT
Assessment  DMT1: 44y Male with DM T1 with hyperglycemia, A1C 6.7% , was on  insulin pump at home, now for cabg surgery, uses omnipod pump with dexcom 7,  now postop and insulin pump now re instated.   Will need tight glycemic control for postop wound healing.   CAD: on medications, stable, monitored. cagb postop   HTN: on antihypertensive medications, monitored, asymptomatic.         Discussed plan and management with Dr Moo Carlson NP - TEAMS  Calos Cortés MD  Cell: [phone]  Office: [phone]            Assessment  DMT1: 44y Male with DM T1 with hyperglycemia, A1C 6.7% , was on  insulin pump at home, now for cabg surgery, uses omnipod pump with dexcom 7,  now postop and insulin pump now re instated.   Will need tight glycemic control for postop wound healing.   CAD: on medications, stable, monitored. cagb postop   HTN: on antihypertensive medications, monitored, asymptomatic.       Discussed plan and management with Dr Moo Carlson NP - TEAMS  Calos Cortés MD  Cell: [phone]  Office: [phone]

## 2024-12-23 NOTE — PROGRESS NOTE ADULT - PROBLEM SELECTOR PLAN 1
Continue with ASA 81 mg PO Daily.   Continue with Lopressor 25 mg PO every 8 hours   Continue with Lipitor 80 mg PO HS    Continue on Norvasc 2.5 mg PO daily for radial graft   12/22 multple bms  - colchicine d/c'd   Continue on ERP protocol Amio 400 mg PO BID x 3 days / Vitamin 500 mg PO BID x 5 days and Neurontin 100 mg PO TID x 5 days    12/22 isolate pw  d/c cts 12/22  Daily CXR   Increase activity as tolerated.   Encourage Chest PT / Pulmonary toileting and Incentive spirometry every 1 hour x 10 while awake.   Continue with Protonix 40 mg PO daily and Lovenox 40 mg SQ daily for PUD and DVT prophylaxis.   Sternal precautions and wound care  Shower on POD #5.   D/C plan home once medically cleared   Plan of care discussed with attending

## 2024-12-23 NOTE — PROGRESS NOTE ADULT - ASSESSMENT
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 Presents to [facility] transferred from [facility]. Initially presented in ED with c/o left sided chest pain which started at about 10.00 am the day prior to presenting to the hospital. Patient had 4 episodes of chest pain lasting about 30 seconds The 4th episode of left sided chest pain at about 9.00-9.30 pm that night. Patient and wife called 911, and he was BIBA to [place] ED. He received ASA in ambulance.  In the ED Troponin trending up: 99.14->184.77, EK bpm in NSR and no acute ST-T changes. 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 Transferred to [facility] CT Surgery Dr. Ibrahim for CABG eval.     Hospital Course:   24- VSS CAD- insulin pump in place - will consult endo to manage glucose- OR date TBD   s/p CABG x 3/ LIMA to LAD / Radial Artery with vein blas to PDA / SVG to Diag.   Post op Course:  Extubated POD # 0   Hypertension on Cardene gtt --> weaned off   Patient with severe anxiety requiring Precedex gtt.    Type 1 DM on insulin gtt. Endocrine following.  Pre op on insulin pump at home.    VSS; Transferred to SDU. Maintain MS and Right CT in place to LWS, likely to D/C in AM.  Continue with current medication regimen.  Insulin gtt per Endo.  Starting Lantus 40 units HS.  Continue on Colchicine 0.6 mg PO BID for PPS.      VSS will d/c CTs this am - & isolate pw's insulin gtt off - tight glycemic control  multiple soft bms laxatives d/c'd  abd distending + flatus - Colchicine d/c'd - pt received 2 doses of Colchicine.      gLUCOSE CONTROL, ENDO FOLLOWING, insulin pump to be started.  If his glucose is under good control , transfer to floor.  d/c planning tues/wed

## 2024-12-23 NOTE — OCCUPATIONAL THERAPY INITIAL EVALUATION ADULT - ADDITIONAL COMMENTS
Pt lives with his spouse and young children in a private home +2 steps to enter (or 1 step from garage) and a flight of stairs inside (can stay on main lvl). Pt has no AE/DME, walk in shower with built in chair, and was independent with all ADLs/mobility PTA.

## 2024-12-23 NOTE — PROGRESS NOTE ADULT - SUBJECTIVE AND OBJECTIVE BOX
VITAL SIGNS    Telemetry:  NSR 80    Vital Signs Last 24 Hrs  T(C): 36.7 (24 @ 11:11), Max: 37.1 (24 @ 02:56)  T(F): 98 (24 @ 11:11), Max: 98.8 (24 @ 02:56)  HR: 94 (24 @ 11:11) (80 - 104)  BP: 116/79 (24 @ 11:11) (115/73 - 139/78)  RR: 19 (24 @ 11:11) (18 - 19)  SpO2: 99% (24 @ 11:11) (95% - 99%)                    @ 07:01  -   @ 07:00  --------------------------------------------------------  IN: 580 mL / OUT: 0 mL / NET: 580 mL     @ 07:01  -   @ 12:12  --------------------------------------------------------  IN: 260 mL / OUT: 0 mL / NET: 260 mL          Daily     Daily Weight in k.4 (23 Dec 2024 08:00)            CAPILLARY BLOOD GLUCOSE      POCT Blood Glucose.: 258 mg/dL (23 Dec 2024 11:15)  POCT Blood Glucose.: 264 mg/dL (23 Dec 2024 10:30)  POCT Blood Glucose.: 310 mg/dL (23 Dec 2024 07:33)  POCT Blood Glucose.: 269 mg/dL (23 Dec 2024 05:57)  POCT Blood Glucose.: 211 mg/dL (23 Dec 2024 00:10)  POCT Blood Glucose.: 72 mg/dL (22 Dec 2024 21:44)  POCT Blood Glucose.: 67 mg/dL (22 Dec 2024 21:25)  POCT Blood Glucose.: 66 mg/dL (22 Dec 2024 21:24)  POCT Blood Glucose.: 148 mg/dL (22 Dec 2024 16:39)  POCT Blood Glucose.: 171 mg/dL (22 Dec 2024 15:44)            Drains:         Pacing Wires        [  ]   Settings:                                  Isolated  [ X ]    Coumadin    [ ] YES          [ X ]      NO                                   PHYSICAL EXAM        Neurology: alert and oriented x 3, nonfocal, no gross deficits  CV : s1 s2 RRR  L RADIAL CDI WARM  Sternal Wound :  CDI , Stable  Lungs: cta  Abdomen: soft, nontender, nondistended, positive bowel sounds, last bowel movement +                     :    voiding        Extremities:     - edema   /  -   calve tenderness ,    l LEG INC CDI            acetaminophen     Tablet .. 650 milliGRAM(s) Oral every 6 hours PRN  amLODIPine   Tablet 2.5 milliGRAM(s) Oral daily  ascorbic acid 500 milliGRAM(s) Oral two times a day  aspirin enteric coated 81 milliGRAM(s) Oral daily  atorvastatin 80 milliGRAM(s) Oral at bedtime  chlorhexidine 2% Cloths 1 Application(s) Topical daily  dextrose 5%. 1000 milliLiter(s) IV Continuous <Continuous>  dextrose 5%. 1000 milliLiter(s) IV Continuous <Continuous>  dextrose 50% Injectable 50 milliLiter(s) IV Push every 15 minutes  dextrose 50% Injectable 25 milliLiter(s) IV Push every 15 minutes  dextrose Oral Gel 15 Gram(s) Oral once PRN  enoxaparin Injectable 40 milliGRAM(s) SubCutaneous every 24 hours  gabapentin 100 milliGRAM(s) Oral every 8 hours  glucagon  Injectable 1 milliGRAM(s) IntraMuscular once  insulin glargine Injectable (LANTUS) 38 Unit(s) SubCutaneous at bedtime  insulin lispro (ADMELOG) corrective regimen sliding scale   SubCutaneous Before meals and at bedtime  insulin lispro Injectable (ADMELOG) 8 Unit(s) SubCutaneous once  insulin lispro Injectable (ADMELOG) 11 Unit(s) SubCutaneous three times a day before meals  metoprolol tartrate 50 milliGRAM(s) Oral two times a day  oxyCODONE    IR 5 milliGRAM(s) Oral every 4 hours PRN  oxyCODONE    IR 10 milliGRAM(s) Oral every 4 hours PRN  pantoprazole    Tablet 40 milliGRAM(s) Oral before breakfast  sodium chloride 0.9%. 1000 milliLiter(s) IV Continuous <Continuous>                    Physical Therapy Rec:   Home  [  ]   Home w/ PT  [  ]  Rehab  [  ]  Discussed with Cardiothoracic Team at AM rounds.

## 2024-12-24 ENCOUNTER — TRANSCRIPTION ENCOUNTER (OUTPATIENT)
Age: 44
End: 2024-12-24

## 2024-12-24 VITALS
SYSTOLIC BLOOD PRESSURE: 101 MMHG | HEART RATE: 106 BPM | RESPIRATION RATE: 18 BRPM | DIASTOLIC BLOOD PRESSURE: 65 MMHG | TEMPERATURE: 98 F | OXYGEN SATURATION: 96 %

## 2024-12-24 DIAGNOSIS — I21.4 NON-ST ELEVATION (NSTEMI) MYOCARDIAL INFARCTION: ICD-10-CM

## 2024-12-24 DIAGNOSIS — E10.65 TYPE 1 DIABETES MELLITUS WITH HYPERGLYCEMIA: ICD-10-CM

## 2024-12-24 DIAGNOSIS — Z96.41 PRESENCE OF INSULIN PUMP (EXTERNAL) (INTERNAL): ICD-10-CM

## 2024-12-24 DIAGNOSIS — I25.10 ATHEROSCLEROTIC HEART DISEASE OF NATIVE CORONARY ARTERY WITHOUT ANGINA PECTORIS: ICD-10-CM

## 2024-12-24 DIAGNOSIS — I10 ESSENTIAL (PRIMARY) HYPERTENSION: ICD-10-CM

## 2024-12-24 DIAGNOSIS — Z82.49 FAMILY HISTORY OF ISCHEMIC HEART DISEASE AND OTHER DISEASES OF THE CIRCULATORY SYSTEM: ICD-10-CM

## 2024-12-24 LAB
ALBUMIN SERPL ELPH-MCNC: 3.8 G/DL — SIGNIFICANT CHANGE UP (ref 3.3–5)
ALP SERPL-CCNC: 67 U/L — SIGNIFICANT CHANGE UP (ref 40–120)
ALT FLD-CCNC: 50 U/L — HIGH (ref 10–45)
ANION GAP SERPL CALC-SCNC: 10 MMOL/L — SIGNIFICANT CHANGE UP (ref 5–17)
AST SERPL-CCNC: 43 U/L — HIGH (ref 10–40)
BASOPHILS # BLD AUTO: 0.03 K/UL — SIGNIFICANT CHANGE UP (ref 0–0.2)
BASOPHILS NFR BLD AUTO: 0.4 % — SIGNIFICANT CHANGE UP (ref 0–2)
BILIRUB SERPL-MCNC: 0.7 MG/DL — SIGNIFICANT CHANGE UP (ref 0.2–1.2)
BUN SERPL-MCNC: 15 MG/DL — SIGNIFICANT CHANGE UP (ref 7–23)
CALCIUM SERPL-MCNC: 9.4 MG/DL — SIGNIFICANT CHANGE UP (ref 8.4–10.5)
CHLORIDE SERPL-SCNC: 100 MMOL/L — SIGNIFICANT CHANGE UP (ref 96–108)
CO2 SERPL-SCNC: 28 MMOL/L — SIGNIFICANT CHANGE UP (ref 22–31)
CREAT SERPL-MCNC: 1.12 MG/DL — SIGNIFICANT CHANGE UP (ref 0.5–1.3)
EGFR: 83 ML/MIN/1.73M2 — SIGNIFICANT CHANGE UP
EOSINOPHIL # BLD AUTO: 0.06 K/UL — SIGNIFICANT CHANGE UP (ref 0–0.5)
EOSINOPHIL NFR BLD AUTO: 0.7 % — SIGNIFICANT CHANGE UP (ref 0–6)
GLUCOSE BLDC GLUCOMTR-MCNC: 196 MG/DL — HIGH (ref 70–99)
GLUCOSE BLDC GLUCOMTR-MCNC: 220 MG/DL — HIGH (ref 70–99)
GLUCOSE SERPL-MCNC: 172 MG/DL — HIGH (ref 70–99)
HCT VFR BLD CALC: 37.9 % — LOW (ref 39–50)
HGB BLD-MCNC: 12.6 G/DL — LOW (ref 13–17)
IMM GRANULOCYTES NFR BLD AUTO: 0.4 % — SIGNIFICANT CHANGE UP (ref 0–0.9)
LYMPHOCYTES # BLD AUTO: 2.05 K/UL — SIGNIFICANT CHANGE UP (ref 1–3.3)
LYMPHOCYTES # BLD AUTO: 25.4 % — SIGNIFICANT CHANGE UP (ref 13–44)
MAGNESIUM SERPL-MCNC: 1.9 MG/DL — SIGNIFICANT CHANGE UP (ref 1.6–2.6)
MCHC RBC-ENTMCNC: 30.9 PG — SIGNIFICANT CHANGE UP (ref 27–34)
MCHC RBC-ENTMCNC: 33.2 G/DL — SIGNIFICANT CHANGE UP (ref 32–36)
MCV RBC AUTO: 92.9 FL — SIGNIFICANT CHANGE UP (ref 80–100)
MONOCYTES # BLD AUTO: 1.11 K/UL — HIGH (ref 0–0.9)
MONOCYTES NFR BLD AUTO: 13.7 % — SIGNIFICANT CHANGE UP (ref 2–14)
NEUTROPHILS # BLD AUTO: 4.8 K/UL — SIGNIFICANT CHANGE UP (ref 1.8–7.4)
NEUTROPHILS NFR BLD AUTO: 59.4 % — SIGNIFICANT CHANGE UP (ref 43–77)
NRBC # BLD: 0 /100 WBCS — SIGNIFICANT CHANGE UP (ref 0–0)
PHOSPHATE SERPL-MCNC: 3.1 MG/DL — SIGNIFICANT CHANGE UP (ref 2.5–4.5)
PLATELET # BLD AUTO: 206 K/UL — SIGNIFICANT CHANGE UP (ref 150–400)
POTASSIUM SERPL-MCNC: 4.4 MMOL/L — SIGNIFICANT CHANGE UP (ref 3.5–5.3)
POTASSIUM SERPL-SCNC: 4.4 MMOL/L — SIGNIFICANT CHANGE UP (ref 3.5–5.3)
PROT SERPL-MCNC: 5.9 G/DL — LOW (ref 6–8.3)
RBC # BLD: 4.08 M/UL — LOW (ref 4.2–5.8)
RBC # FLD: 12 % — SIGNIFICANT CHANGE UP (ref 10.3–14.5)
SODIUM SERPL-SCNC: 138 MMOL/L — SIGNIFICANT CHANGE UP (ref 135–145)
WBC # BLD: 8.08 K/UL — SIGNIFICANT CHANGE UP (ref 3.8–10.5)
WBC # FLD AUTO: 8.08 K/UL — SIGNIFICANT CHANGE UP (ref 3.8–10.5)

## 2024-12-24 PROCEDURE — 84100 ASSAY OF PHOSPHORUS: CPT

## 2024-12-24 PROCEDURE — 85018 HEMOGLOBIN: CPT

## 2024-12-24 PROCEDURE — 36415 COLL VENOUS BLD VENIPUNCTURE: CPT

## 2024-12-24 PROCEDURE — 82962 GLUCOSE BLOOD TEST: CPT

## 2024-12-24 PROCEDURE — 85576 BLOOD PLATELET AGGREGATION: CPT

## 2024-12-24 PROCEDURE — 82435 ASSAY OF BLOOD CHLORIDE: CPT

## 2024-12-24 PROCEDURE — C1889: CPT

## 2024-12-24 PROCEDURE — 84295 ASSAY OF SERUM SODIUM: CPT

## 2024-12-24 PROCEDURE — 85014 HEMATOCRIT: CPT

## 2024-12-24 PROCEDURE — 74018 RADEX ABDOMEN 1 VIEW: CPT

## 2024-12-24 PROCEDURE — 85730 THROMBOPLASTIN TIME PARTIAL: CPT

## 2024-12-24 PROCEDURE — 86900 BLOOD TYPING SEROLOGIC ABO: CPT

## 2024-12-24 PROCEDURE — C1751: CPT

## 2024-12-24 PROCEDURE — 87641 MR-STAPH DNA AMP PROBE: CPT

## 2024-12-24 PROCEDURE — 84443 ASSAY THYROID STIM HORMONE: CPT

## 2024-12-24 PROCEDURE — 84132 ASSAY OF SERUM POTASSIUM: CPT

## 2024-12-24 PROCEDURE — 86901 BLOOD TYPING SEROLOGIC RH(D): CPT

## 2024-12-24 PROCEDURE — 86850 RBC ANTIBODY SCREEN: CPT

## 2024-12-24 PROCEDURE — 83036 HEMOGLOBIN GLYCOSYLATED A1C: CPT

## 2024-12-24 PROCEDURE — 93325 DOPPLER ECHO COLOR FLOW MAPG: CPT

## 2024-12-24 PROCEDURE — 85027 COMPLETE CBC AUTOMATED: CPT

## 2024-12-24 PROCEDURE — P9045: CPT

## 2024-12-24 PROCEDURE — 71045 X-RAY EXAM CHEST 1 VIEW: CPT

## 2024-12-24 PROCEDURE — 84439 ASSAY OF FREE THYROXINE: CPT

## 2024-12-24 PROCEDURE — 82803 BLOOD GASES ANY COMBINATION: CPT

## 2024-12-24 PROCEDURE — 93320 DOPPLER ECHO COMPLETE: CPT

## 2024-12-24 PROCEDURE — 86891 AUTOLOGOUS BLOOD OP SALVAGE: CPT

## 2024-12-24 PROCEDURE — 94002 VENT MGMT INPAT INIT DAY: CPT

## 2024-12-24 PROCEDURE — 83735 ASSAY OF MAGNESIUM: CPT

## 2024-12-24 PROCEDURE — 83605 ASSAY OF LACTIC ACID: CPT

## 2024-12-24 PROCEDURE — 85520 HEPARIN ASSAY: CPT

## 2024-12-24 PROCEDURE — 97165 OT EVAL LOW COMPLEX 30 MIN: CPT

## 2024-12-24 PROCEDURE — C1769: CPT

## 2024-12-24 PROCEDURE — 84436 ASSAY OF TOTAL THYROXINE: CPT

## 2024-12-24 PROCEDURE — 94010 BREATHING CAPACITY TEST: CPT

## 2024-12-24 PROCEDURE — 82330 ASSAY OF CALCIUM: CPT

## 2024-12-24 PROCEDURE — 82947 ASSAY GLUCOSE BLOOD QUANT: CPT

## 2024-12-24 PROCEDURE — 93005 ELECTROCARDIOGRAM TRACING: CPT

## 2024-12-24 PROCEDURE — 93880 EXTRACRANIAL BILAT STUDY: CPT

## 2024-12-24 PROCEDURE — 97116 GAIT TRAINING THERAPY: CPT

## 2024-12-24 PROCEDURE — 97530 THERAPEUTIC ACTIVITIES: CPT

## 2024-12-24 PROCEDURE — 83880 ASSAY OF NATRIURETIC PEPTIDE: CPT

## 2024-12-24 PROCEDURE — 85610 PROTHROMBIN TIME: CPT

## 2024-12-24 PROCEDURE — 85384 FIBRINOGEN ACTIVITY: CPT

## 2024-12-24 PROCEDURE — 87640 STAPH A DNA AMP PROBE: CPT

## 2024-12-24 PROCEDURE — 80053 COMPREHEN METABOLIC PANEL: CPT

## 2024-12-24 PROCEDURE — 85025 COMPLETE CBC W/AUTO DIFF WBC: CPT

## 2024-12-24 PROCEDURE — 86923 COMPATIBILITY TEST ELECTRIC: CPT

## 2024-12-24 PROCEDURE — 81003 URINALYSIS AUTO W/O SCOPE: CPT

## 2024-12-24 PROCEDURE — 80048 BASIC METABOLIC PNL TOTAL CA: CPT

## 2024-12-24 PROCEDURE — 84480 ASSAY TRIIODOTHYRONINE (T3): CPT

## 2024-12-24 PROCEDURE — 97161 PT EVAL LOW COMPLEX 20 MIN: CPT

## 2024-12-24 RX ORDER — OXYCODONE HCL 15 MG
1 TABLET ORAL
Qty: 20 | Refills: 0
Start: 2024-12-24 | End: 2024-12-28

## 2024-12-24 RX ORDER — METOPROLOL TARTRATE 50 MG
100 TABLET ORAL
Refills: 0 | Status: DISCONTINUED | OUTPATIENT
Start: 2024-12-24 | End: 2024-12-24

## 2024-12-24 RX ORDER — MAGNESIUM SULFATE 500 MG/ML
2 INJECTION, SOLUTION INTRAMUSCULAR; INTRAVENOUS ONCE
Refills: 0 | Status: COMPLETED | OUTPATIENT
Start: 2024-12-24 | End: 2024-12-24

## 2024-12-24 RX ORDER — PANTOPRAZOLE 40 MG/1
1 TABLET, DELAYED RELEASE ORAL
Qty: 30 | Refills: 0
Start: 2024-12-24 | End: 2025-01-22

## 2024-12-24 RX ORDER — METOPROLOL TARTRATE 50 MG
1 TABLET ORAL
Qty: 60 | Refills: 0
Start: 2024-12-24 | End: 2025-01-22

## 2024-12-24 RX ORDER — METOPROLOL TARTRATE 50 MG
50 TABLET ORAL ONCE
Refills: 0 | Status: COMPLETED | OUTPATIENT
Start: 2024-12-24 | End: 2024-12-24

## 2024-12-24 RX ORDER — ATORVASTATIN CALCIUM 40 MG/1
1 TABLET, FILM COATED ORAL
Qty: 30 | Refills: 0
Start: 2024-12-24 | End: 2025-01-22

## 2024-12-24 RX ORDER — ACETAMINOPHEN 80 MG/.8ML
2 SOLUTION/ DROPS ORAL
Qty: 0 | Refills: 0 | DISCHARGE
Start: 2024-12-24

## 2024-12-24 RX ORDER — METOPROLOL TARTRATE 50 MG
50 TABLET ORAL EVERY 8 HOURS
Refills: 0 | Status: DISCONTINUED | OUTPATIENT
Start: 2024-12-24 | End: 2024-12-24

## 2024-12-24 RX ADMIN — Medication 81 MILLIGRAM(S): at 11:55

## 2024-12-24 RX ADMIN — Medication 2.5 MILLIGRAM(S): at 04:28

## 2024-12-24 RX ADMIN — PANTOPRAZOLE 40 MILLIGRAM(S): 40 TABLET, DELAYED RELEASE ORAL at 04:28

## 2024-12-24 RX ADMIN — Medication 50 MILLIGRAM(S): at 04:27

## 2024-12-24 RX ADMIN — GABAPENTIN 100 MILLIGRAM(S): 300 CAPSULE ORAL at 04:28

## 2024-12-24 RX ADMIN — Medication 500 MILLIGRAM(S): at 04:28

## 2024-12-24 RX ADMIN — MAGNESIUM SULFATE 25 GRAM(S): 500 INJECTION, SOLUTION INTRAMUSCULAR; INTRAVENOUS at 05:02

## 2024-12-24 RX ADMIN — CHLORHEXIDINE GLUCONATE 1 APPLICATION(S): 1.2 RINSE ORAL at 06:54

## 2024-12-24 RX ADMIN — Medication 50 MILLIGRAM(S): at 11:55

## 2024-12-24 NOTE — DISCHARGE NOTE PROVIDER - NSDCFUADDAPPT_GEN_ALL_CORE_FT
Follow up appt with DR.Robert Ibrahim in 10-14 days - call our office on Thursday after 9am to schedule your post-op appt.  follow up appt with your Cardiologist and/or Primary Care MD in 3-4 weeks    Your Care Navigator Nurse Practitioner will be in touch to see you in your home within a few days from discharge. The Follow Your Heart program can help ensure you understand your medications, discharge instructions and answer any questions you may have at that time. They are also a great source to address concerns during the day and may be reached at [phone].

## 2024-12-24 NOTE — PROGRESS NOTE ADULT - PROBLEM SELECTOR PROBLEM 1
S/P CABG x 3
Diabetes mellitus type 1
Diabetes mellitus type 1
S/P CABG x 3
Diabetes mellitus type 1
CAD, multiple vessel
S/P CABG x 3

## 2024-12-24 NOTE — DISCHARGE NOTE NURSING/CASE MANAGEMENT/SOCIAL WORK - FINANCIAL ASSISTANCE
[facility] provides services at a reduced cost to those who are determined to be eligible through [facility]’s financial assistance program. Information regarding [facility]’s financial assistance program can be found by going to [email or web address] or by calling [phone]. -Continue Toprol Xl 50 mg daily.

## 2024-12-24 NOTE — PROGRESS NOTE ADULT - PROVIDER SPECIALTY LIST ADULT
CT Surgery
Critical Care
Endocrinology
Critical Care
CT Surgery
Critical Care
Endocrinology
Endocrinology
CT Surgery
Endocrinology
Endocrinology
CT Surgery
Endocrinology

## 2024-12-24 NOTE — DISCHARGE NOTE PROVIDER - HOSPITAL COURSE
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 Presents to [facility] transferred from [facility]. Initially presented in ED with c/o left sided chest pain which started at about 10.00 am the day prior to presenting to the hospital. Patient had 4 episodes of chest pain lasting about 30 seconds The 4th episode of left sided chest pain at about 9.00-9.30 pm that night. Patient and wife called 911, and he was BIBA to [place] ED. He received ASA in ambulance.  In the ED Troponin trending up: 99.14->184.77, EK bpm in NSR and no acute ST-T changes. 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 Transferred to [facility] CT Surgery Dr. Ibrahim for CABG eval.     Hospital Course:   24- VSS CAD- insulin pump in place - will consult endo to manage glucose- OR date TBD   s/p CABG x 3/ LIMA to LAD / Radial Artery with vein blas to PDA / SVG to Diag.   Post op Course:  Extubated POD # 0   Hypertension on Cardene gtt --> weaned off   Patient with severe anxiety requiring Precedex gtt.    Type 1 DM on insulin gtt. Endocrine following.  Pre op on insulin pump at home.    VSS; Transferred to SDU. Maintain MS and Right CT in place to LWS, likely to D/C in AM.  Continue with current medication regimen.  Insulin gtt per Endo.  Starting Lantus 40 units HS.  Continue on Colchicine 0.6 mg PO BID for PPS.      VSS will d/c CTs this am - & isolate pw's insulin gtt off - tight glycemic control  multiple soft bms laxatives d/c'd  abd distending + flatus - Colchicine d/c'd - pt received 2 doses of Colchicine.      gLUCOSE CONTROL, ENDO FOLLOWING, insulin pump to be started.  If his glucose is under good control , transfer to floor.  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 Presents to [facility] transferred from [facility]. Initially presented in ED with c/o left sided chest pain which started at about 10.00 am the day prior to presenting to the hospital. Patient had 4 episodes of chest pain lasting about 30 seconds The 4th episode of left sided chest pain at about 9.00-9.30 pm that night. Patient and wife called 911, and he was BIBA to [place] ED. He received ASA in ambulance.  In the ED Troponin trending up: 99.14->184.77, EK bpm in NSR and no acute ST-T changes. 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 Transferred to [facility] CT Surgery Dr. Ibrahim for CABG eval.     Hospital Course:   24- VSS CAD- insulin pump in place - will consult endo to manage glucose- OR date TBD   s/p CABG x 3/ LIMA to LAD / Radial Artery with vein blas to PDA / SVG to Diag.   Post op Course:  Extubated POD # 0   Hypertension on Cardene gtt --> weaned off   Patient with severe anxiety requiring Precedex gtt.    Type 1 DM on insulin gtt. Endocrine following.  Pre op on insulin pump at home.    VSS; Transferred to SDU. Maintain MS and Right CT in place to LWS, likely to D/C in AM.  Continue with current medication regimen.  Insulin gtt per Endo.  Starting Lantus 40 units HS.  Continue on Colchicine 0.6 mg PO BID for PPS.      VSS will d/c CTs this am - & isolate pw's insulin gtt off - tight glycemic control  multiple soft bms laxatives d/c'd  abd distending + flatus - Colchicine d/c'd - pt received 2 doses of Colchicine.      gLUCOSE CONTROL, ENDO FOLLOWING, insulin pump to be started.  If his glucose is under good control , transfer to floor.  d/c planning tues/ VSS - ST asymptomatic  BB increased to toprol 100mg po bid.  pw cut  rounds made w/ Dr. Irwin - covering Dr. Ibrahim.  cleared for d/c home

## 2024-12-24 NOTE — PROGRESS NOTE ADULT - PROBLEM SELECTOR PROBLEM 2
CAD, multiple vessel
Diabetes mellitus type 1
CAD, multiple vessel
Diabetes mellitus type 1

## 2024-12-24 NOTE — PROGRESS NOTE ADULT - ASSESSMENT
Assessment  DMT1: 44y Male with DM T1 with hyperglycemia, A1C 6.7% , was on  insulin pump at home, now for cabg surgery, uses omnipod pump with dexcom 7,  now postop and insulin pump sugars improving.  Will need tight glycemic control for postop wound healing.   CAD: on medications, stable, monitored. cagb postop   HTN: on antihypertensive medications, monitored, asymptomatic.      Calos Cortés MD  Cell: [phone]  Office: [phone]

## 2024-12-24 NOTE — DISCHARGE NOTE PROVIDER - NSDCPNSUBOBJ_GEN_ALL_CORE
PHYSICAL EXAM  vss  Neurology: alert and oriented x 3, nonfocal, no gross deficits  CV : s1 s2 RRR  L RADIAL CDI WARM  Sternal Wound :  CDI , Stable  Lungs: cta  Abdomen: soft, nontender, nondistended, positive bowel sounds, last bowel movement +                     :    voiding        Extremities:     - edema   /  -   calve tenderness ,    l LEG INC CDI

## 2024-12-24 NOTE — DISCHARGE NOTE PROVIDER - CARE PROVIDERS DIRECT ADDRESSES
CC: narrow angles OU    HPI: 68yo CF with h/o narrow angles OU and episode of acute angle closure OD 02/18. Patient was treated with emergent PI OD, then retreated due to concern for incomplete patency of PI. She had a fainting episode with the repeat PI and had to go to the ER. She now presents for prophylactic PI OS for anatomic narrow angles. She states her right eye is a little blurry, denies pain, redness, irritation, diplopia, flashes, floaters.    POHx:  Anatomic narrow angles OU  Acute angle closure OD 02/18  S/p LPI OD x 2    Gtts:  Prednisolone daily OD    A/P:  1. Acute angle closure OD  - monitor PI, transilluminates well, though peripheral angle still shallow  - continue prednisolone daily OD x 1 week, then STOP    2. Narrow angles OU  - recommend prophylactic PI OS today  - R/B/A discussed  - informed consent obtained  - start Prednisolone QID x 1week, then BID x 1week, then Daily x 1week, then STOP  - recommend baseline glaucoma tests at follow-up    RTC 1 month, OVF OU, RNFL OCT OU    ~~~~~~~~~~~~~~~~~~~~~~~~~~~~~~~~~~~~~~~~~~~~~~~~~~~~~~~~~~~~~~~~    Complete documentation of historical and exam elements from today's encounter can be found in the full encounter summary report (not reduplicated in this progress note). I personally obtained the chief complaint(s) and history of present illness.  I confirmed and edited as necessary the review of systems, past medical/surgical history, family history, social history, and examination findings as documented by others; and I examined the patient myself. I personally reviewed the relevant tests, images, and reports as documented above. I formulated and edited as necessary the assessment and plan and discussed the findings and management plan with the patient and family.    I was present for the entire procedure.    Jose Medina MD      
[email or web address]

## 2024-12-24 NOTE — DISCHARGE NOTE NURSING/CASE MANAGEMENT/SOCIAL WORK - NSDCFUADDAPPT_GEN_ALL_CORE_FT
Follow up appt with DR.Robert Ibrahim in 10-14 days - call our office on Thursday after 9am to schedule your post-op appt.  follow up appt with your Cardiologist and/or Primary Care MD in 3-4 weeks    Your Care Navigator Nurse Practitioner will be in touch to see you in your home within a few days from discharge. The Follow Your Heart program can help ensure you understand your medications, discharge instructions and answer any questions you may have at that time. They are also a great source to address concerns during the day and may be reached at [phone].

## 2024-12-24 NOTE — DISCHARGE NOTE NURSING/CASE MANAGEMENT/SOCIAL WORK - PATIENT PORTAL LINK FT
You can access the FollowMyHealth Patient Portal offered by [facility] by registering at the following website: [email or web address]. By joining [facility]’s FollowMyHealth portal, you will also be able to view your health information using other applications (apps) compatible with our system.

## 2024-12-24 NOTE — DISCHARGE NOTE PROVIDER - NSDCMRMEDTOKEN_GEN_ALL_CORE_FT
acetaminophen: 2 tab(s) orally every 6 hours as needed for  mild pain  amLODIPine 2.5 mg oral tablet: 1 tab(s) orally once a day  aspirin 81 mg oral tablet, chewable: 1 tab(s) orally once a day  atorvastatin 80 mg oral tablet: 1 tab(s) orally once a day (at bedtime)  HumaLOG 100 units/mL subcutaneous solution: 1 unit(s) subcutaneous 3 times a day (with meals) Use with Insulin Pump - due to be refilled on 12/17  Insulin Pump: Use with Humalog- due to be refilled on 12/17  Basal rate: 1/2 units/hr, max bolus 4 units/hr  Insulin to Carb Ratio: 1 unit: 6g   ISF: 1 unit : 15 points  BG Target: 120 mg/dL  oxyCODONE 5 mg oral tablet: 1 tab(s) orally every 6 hours as needed for Moderate Pain (4 - 6) MDD: 4  pantoprazole 40 mg oral delayed release tablet: 1 tab(s) orally once a day (before a meal)  Toprol- mg oral tablet, extended release: 1 tab(s) orally 2 times a day

## 2024-12-24 NOTE — DISCHARGE NOTE PROVIDER - CARE PROVIDER_API CALL
José Miguel Ibrahim  Thoracic and Cardiac Surgery  [address] [place] [id number]  Phone: [phone]  Fax: [phone]  Follow Up Time:

## 2024-12-24 NOTE — PROGRESS NOTE ADULT - SUBJECTIVE AND OBJECTIVE BOX
Chief complaint    Patient is a 44y old  Male who presents with a chief complaint of sob (22 Dec 2024 06:41)   Review of systems  Patient appears comfortable.    Labs and Fingersticks  CAPILLARY BLOOD GLUCOSE      POCT Blood Glucose.: 220 mg/dL (24 Dec 2024 07:48)  POCT Blood Glucose.: 127 mg/dL (23 Dec 2024 23:57)  POCT Blood Glucose.: 89 mg/dL (23 Dec 2024 23:13)  POCT Blood Glucose.: 120 mg/dL (23 Dec 2024 21:14)  POCT Blood Glucose.: 91 mg/dL (23 Dec 2024 16:40)  POCT Blood Glucose.: 98 mg/dL (23 Dec 2024 15:18)  POCT Blood Glucose.: 138 mg/dL (23 Dec 2024 14:14)  POCT Blood Glucose.: 223 mg/dL (23 Dec 2024 12:51)  POCT Blood Glucose.: 258 mg/dL (23 Dec 2024 11:15)  POCT Blood Glucose.: 264 mg/dL (23 Dec 2024 10:30)      Anion Gap: 10 (12-24 @ 04:17)  Anion Gap: 10 (12-23 @ 06:23)      Calcium: 9.4 (12-24 @ 04:17)  Calcium: 9.0 (12-23 @ 06:23)  Albumin: 3.8 (12-24 @ 04:17)    Alanine Aminotransferase (ALT/SGPT): 50 *H* (12-24 @ 04:17)  Alkaline Phosphatase: 67 (12-24 @ 04:17)  Aspartate Aminotransferase (AST/SGOT): 43 *H* (12-24 @ 04:17)        12-24    138  |  100  |  15  ----------------------------<  172[H]  4.4   |  28  |  1.12    Ca    9.4      24 Dec 2024 04:17  Phos  3.1     12-24  Mg     1.9     12-24    TPro  5.9[L]  /  Alb  3.8  /  TBili  0.7  /  DBili  x   /  AST  43[H]  /  ALT  50[H]  /  AlkPhos  67  12-24                        12.6   8.08  )-----------( 206      ( 24 Dec 2024 04:17 )             37.9     Medications  MEDICATIONS  (STANDING):  amLODIPine   Tablet 2.5 milliGRAM(s) Oral daily  aspirin enteric coated 81 milliGRAM(s) Oral daily  atorvastatin 80 milliGRAM(s) Oral at bedtime  chlorhexidine 2% Cloths 1 Application(s) Topical daily  dextrose 5%. 1000 milliLiter(s) (50 mL/Hr) IV Continuous <Continuous>  dextrose 5%. 1000 milliLiter(s) (100 mL/Hr) IV Continuous <Continuous>  dextrose 50% Injectable 50 milliLiter(s) IV Push every 15 minutes  dextrose 50% Injectable 25 milliLiter(s) IV Push every 15 minutes  enoxaparin Injectable 40 milliGRAM(s) SubCutaneous every 24 hours  glucagon  Injectable 1 milliGRAM(s) IntraMuscular once  insulin lispro (HumaLOG) Pump 1 Each SubCutaneous Continuous Pump  metoprolol tartrate 50 milliGRAM(s) Oral every 8 hours  pantoprazole    Tablet 40 milliGRAM(s) Oral before breakfast  sodium chloride 0.9%. 1000 milliLiter(s) (10 mL/Hr) IV Continuous <Continuous>      Physical Exam  General: Patient appears comfortable.  Vital Signs Last 12 Hrs  T(F): 98.1 (12-24-24 @ 07:20), Max: 98.1 (12-23-24 @ 23:20)  HR: 87 (12-24-24 @ 07:20) (87 - 109)  BP: 105/72 (12-24-24 @ 07:20) (105/72 - 154/93)  BP(mean): 83 (12-24-24 @ 07:20) (83 - 117)  RR: 18 (12-24-24 @ 07:20) (18 - 18)  SpO2: 96% (12-24-24 @ 07:20) (95% - 96%)  Neck: No palpable thyroid nodules.  CVS: S1S2, No murmurs  Respiratory: No wheezing, no crepitations  GI: Abdomen soft, non tender.    Diagnostics        Radiology:

## 2024-12-26 ENCOUNTER — APPOINTMENT (OUTPATIENT)
Dept: CARE COORDINATION | Facility: HOME HEALTH | Age: 44
End: 2024-12-26
Payer: COMMERCIAL

## 2024-12-26 VITALS
HEART RATE: 83 BPM | OXYGEN SATURATION: 95 % | SYSTOLIC BLOOD PRESSURE: 98 MMHG | DIASTOLIC BLOOD PRESSURE: 62 MMHG | RESPIRATION RATE: 16 BRPM

## 2024-12-26 PROCEDURE — 99024 POSTOP FOLLOW-UP VISIT: CPT

## 2024-12-26 RX ORDER — PANTOPRAZOLE 40 MG/1
40 TABLET, DELAYED RELEASE ORAL DAILY
Qty: 30 | Refills: 1 | Status: ACTIVE | COMMUNITY
Start: 2024-12-26

## 2024-12-26 RX ORDER — ATORVASTATIN CALCIUM 80 MG/1
80 TABLET, FILM COATED ORAL
Qty: 30 | Refills: 0 | Status: ACTIVE | COMMUNITY
Start: 2024-12-26

## 2024-12-26 RX ORDER — ASPIRIN ENTERIC COATED TABLETS 81 MG 81 MG/1
81 TABLET, DELAYED RELEASE ORAL DAILY
Qty: 30 | Refills: 5 | Status: ACTIVE | COMMUNITY
Start: 2024-12-26

## 2024-12-26 RX ORDER — AMLODIPINE BESYLATE 2.5 MG/1
2.5 TABLET ORAL DAILY
Refills: 0 | Status: ACTIVE | COMMUNITY
Start: 2024-12-26

## 2024-12-26 RX ORDER — ACETAMINOPHEN 325 MG/1
325 TABLET ORAL EVERY 6 HOURS
Refills: 0 | Status: ACTIVE | COMMUNITY
Start: 2024-12-26

## 2025-01-01 ENCOUNTER — TRANSCRIPTION ENCOUNTER (OUTPATIENT)
Age: 45
End: 2025-01-01

## 2025-01-02 ENCOUNTER — TRANSCRIPTION ENCOUNTER (OUTPATIENT)
Age: 45
End: 2025-01-02

## 2025-01-02 ENCOUNTER — APPOINTMENT (OUTPATIENT)
Dept: CARDIOTHORACIC SURGERY | Facility: CLINIC | Age: 45
End: 2025-01-02
Payer: COMMERCIAL

## 2025-01-02 VITALS
WEIGHT: 144 LBS | DIASTOLIC BLOOD PRESSURE: 64 MMHG | TEMPERATURE: 97.1 F | SYSTOLIC BLOOD PRESSURE: 97 MMHG | RESPIRATION RATE: 17 BRPM | BODY MASS INDEX: 21.82 KG/M2 | HEIGHT: 68 IN | OXYGEN SATURATION: 97 % | HEART RATE: 63 BPM

## 2025-01-02 PROBLEM — Z95.1 S/P CABG X 3: Status: ACTIVE | Noted: 2025-01-02

## 2025-01-02 PROBLEM — R21 RASH: Status: ACTIVE | Noted: 2025-01-02

## 2025-01-02 PROCEDURE — 99024 POSTOP FOLLOW-UP VISIT: CPT

## 2025-01-02 RX ORDER — METOPROLOL SUCCINATE 100 MG/1
100 TABLET, EXTENDED RELEASE ORAL TWICE DAILY
Refills: 0 | Status: ACTIVE | COMMUNITY
Start: 2024-12-26

## 2025-01-02 RX ORDER — TRIAMCINOLONE ACETONIDE 1 MG/G
0.1 CREAM TOPICAL TWICE DAILY
Qty: 1 | Refills: 0 | Status: ACTIVE | COMMUNITY
Start: 2025-01-02 | End: 1900-01-01

## 2025-01-02 RX ORDER — CAMPHOR 0.45 %
25 GEL (GRAM) TOPICAL
Qty: 14 | Refills: 0 | Status: ACTIVE | COMMUNITY
Start: 2025-01-02 | End: 1900-01-01

## 2025-01-07 ENCOUNTER — APPOINTMENT (OUTPATIENT)
Dept: CARDIOTHORACIC SURGERY | Facility: CLINIC | Age: 45
End: 2025-01-07
Payer: COMMERCIAL

## 2025-01-07 VITALS
HEART RATE: 75 BPM | HEIGHT: 68 IN | RESPIRATION RATE: 15 BRPM | SYSTOLIC BLOOD PRESSURE: 101 MMHG | DIASTOLIC BLOOD PRESSURE: 66 MMHG | BODY MASS INDEX: 21.37 KG/M2 | TEMPERATURE: 98 F | OXYGEN SATURATION: 99 % | WEIGHT: 141 LBS

## 2025-01-07 DIAGNOSIS — R21 RASH AND OTHER NONSPECIFIC SKIN ERUPTION: ICD-10-CM

## 2025-01-07 DIAGNOSIS — Z95.1 PRESENCE OF AORTOCORONARY BYPASS GRAFT: ICD-10-CM

## 2025-01-07 PROCEDURE — 99024 POSTOP FOLLOW-UP VISIT: CPT

## 2025-01-21 ENCOUNTER — APPOINTMENT (OUTPATIENT)
Dept: CARDIOLOGY | Facility: CLINIC | Age: 45
End: 2025-01-21

## 2025-01-22 ENCOUNTER — NON-APPOINTMENT (OUTPATIENT)
Age: 45
End: 2025-01-22

## 2025-01-22 ENCOUNTER — APPOINTMENT (OUTPATIENT)
Dept: CARDIOLOGY | Facility: CLINIC | Age: 45
End: 2025-01-22
Payer: COMMERCIAL

## 2025-01-22 VITALS
OXYGEN SATURATION: 100 % | HEART RATE: 61 BPM | WEIGHT: 145.38 LBS | HEIGHT: 68 IN | BODY MASS INDEX: 22.03 KG/M2 | SYSTOLIC BLOOD PRESSURE: 102 MMHG | DIASTOLIC BLOOD PRESSURE: 58 MMHG

## 2025-01-22 DIAGNOSIS — Z78.9 OTHER SPECIFIED HEALTH STATUS: ICD-10-CM

## 2025-01-22 DIAGNOSIS — Z95.1 PRESENCE OF AORTOCORONARY BYPASS GRAFT: ICD-10-CM

## 2025-01-22 DIAGNOSIS — I10 ESSENTIAL (PRIMARY) HYPERTENSION: ICD-10-CM

## 2025-01-22 DIAGNOSIS — E10.59 TYPE 1 DIABETES MELLITUS WITH OTHER CIRCULATORY COMPLICATIONS: ICD-10-CM

## 2025-01-22 PROCEDURE — 99215 OFFICE O/P EST HI 40 MIN: CPT

## 2025-01-22 PROCEDURE — 93000 ELECTROCARDIOGRAM COMPLETE: CPT

## 2025-01-22 PROCEDURE — G2211 COMPLEX E/M VISIT ADD ON: CPT | Mod: NC

## 2025-01-23 ENCOUNTER — TRANSCRIPTION ENCOUNTER (OUTPATIENT)
Age: 45
End: 2025-01-23

## 2025-01-24 LAB
ALBUMIN SERPL ELPH-MCNC: 4.6 G/DL
ALP BLD-CCNC: 88 U/L
ALT SERPL-CCNC: 55 U/L
ANION GAP SERPL CALC-SCNC: 12 MMOL/L
AST SERPL-CCNC: 22 U/L
BASOPHILS # BLD AUTO: 0.07 K/UL
BASOPHILS NFR BLD AUTO: 1.2 %
BILIRUB SERPL-MCNC: 0.7 MG/DL
BUN SERPL-MCNC: 25 MG/DL
CALCIUM SERPL-MCNC: 10.4 MG/DL
CHLORIDE SERPL-SCNC: 100 MMOL/L
CHOLEST SERPL-MCNC: 109 MG/DL
CO2 SERPL-SCNC: 27 MMOL/L
CREAT SERPL-MCNC: 1.35 MG/DL
EGFR: 66 ML/MIN/1.73M2
EOSINOPHIL # BLD AUTO: 0.14 K/UL
EOSINOPHIL NFR BLD AUTO: 2.3 %
ESTIMATED AVERAGE GLUCOSE: 143 MG/DL
GLUCOSE SERPL-MCNC: 85 MG/DL
HBA1C MFR BLD HPLC: 6.6 %
HCT VFR BLD CALC: 43.2 %
HDLC SERPL-MCNC: 32 MG/DL
HGB BLD-MCNC: 13.5 G/DL
IMM GRANULOCYTES NFR BLD AUTO: 0.2 %
LDLC SERPL CALC-MCNC: 56 MG/DL
LYMPHOCYTES # BLD AUTO: 2.06 K/UL
LYMPHOCYTES NFR BLD AUTO: 34.5 %
MAN DIFF?: NORMAL
MCHC RBC-ENTMCNC: 31.1 PG
MCHC RBC-ENTMCNC: 31.3 G/DL
MCV RBC AUTO: 99.5 FL
MONOCYTES # BLD AUTO: 0.64 K/UL
MONOCYTES NFR BLD AUTO: 10.7 %
NEUTROPHILS # BLD AUTO: 3.05 K/UL
NEUTROPHILS NFR BLD AUTO: 51.1 %
NONHDLC SERPL-MCNC: 77 MG/DL
PLATELET # BLD AUTO: 253 K/UL
POTASSIUM SERPL-SCNC: 5.1 MMOL/L
PROT SERPL-MCNC: 7 G/DL
RBC # BLD: 4.34 M/UL
RBC # FLD: 12.7 %
SODIUM SERPL-SCNC: 139 MMOL/L
TRIGL SERPL-MCNC: 114 MG/DL
TSH SERPL-ACNC: 1.7 UIU/ML
WBC # FLD AUTO: 5.97 K/UL

## 2025-01-30 ENCOUNTER — APPOINTMENT (OUTPATIENT)
Dept: CARDIOLOGY | Facility: CLINIC | Age: 45
End: 2025-01-30

## 2025-02-11 ENCOUNTER — APPOINTMENT (OUTPATIENT)
Dept: CARDIOLOGY | Facility: CLINIC | Age: 45
End: 2025-02-11
Payer: COMMERCIAL

## 2025-02-11 VITALS
SYSTOLIC BLOOD PRESSURE: 100 MMHG | DIASTOLIC BLOOD PRESSURE: 68 MMHG | BODY MASS INDEX: 21.29 KG/M2 | WEIGHT: 140 LBS | HEART RATE: 65 BPM | OXYGEN SATURATION: 96 %

## 2025-02-11 DIAGNOSIS — Z95.1 PRESENCE OF AORTOCORONARY BYPASS GRAFT: ICD-10-CM

## 2025-02-11 DIAGNOSIS — Z00.00 ENCOUNTER FOR GENERAL ADULT MEDICAL EXAMINATION W/OUT ABNORMAL FINDINGS: ICD-10-CM

## 2025-02-11 DIAGNOSIS — I10 ESSENTIAL (PRIMARY) HYPERTENSION: ICD-10-CM

## 2025-02-11 DIAGNOSIS — E10.59 TYPE 1 DIABETES MELLITUS WITH OTHER CIRCULATORY COMPLICATIONS: ICD-10-CM

## 2025-02-11 PROCEDURE — 99214 OFFICE O/P EST MOD 30 MIN: CPT | Mod: 25

## 2025-02-11 PROCEDURE — 93798 PHYS/QHP OP CAR RHAB W/ECG: CPT

## 2025-02-12 ENCOUNTER — APPOINTMENT (OUTPATIENT)
Dept: CARDIOLOGY | Facility: CLINIC | Age: 45
End: 2025-02-12
Payer: COMMERCIAL

## 2025-02-12 PROCEDURE — 93797 PHYS/QHP OP CAR RHAB WO ECG: CPT | Mod: 95

## 2025-02-13 ENCOUNTER — APPOINTMENT (OUTPATIENT)
Dept: CARDIOLOGY | Facility: CLINIC | Age: 45
End: 2025-02-13
Payer: COMMERCIAL

## 2025-02-13 PROCEDURE — 93797 PHYS/QHP OP CAR RHAB WO ECG: CPT | Mod: 95

## 2025-02-19 ENCOUNTER — APPOINTMENT (OUTPATIENT)
Dept: CARDIOLOGY | Facility: CLINIC | Age: 45
End: 2025-02-19
Payer: COMMERCIAL

## 2025-02-19 PROCEDURE — 93797 PHYS/QHP OP CAR RHAB WO ECG: CPT | Mod: 95

## 2025-02-20 ENCOUNTER — APPOINTMENT (OUTPATIENT)
Dept: CARDIOLOGY | Facility: CLINIC | Age: 45
End: 2025-02-20
Payer: COMMERCIAL

## 2025-02-20 PROCEDURE — 93797 PHYS/QHP OP CAR RHAB WO ECG: CPT | Mod: 95

## 2025-02-24 ENCOUNTER — APPOINTMENT (OUTPATIENT)
Dept: CARDIOLOGY | Facility: CLINIC | Age: 45
End: 2025-02-24
Payer: COMMERCIAL

## 2025-02-24 PROCEDURE — 93797 PHYS/QHP OP CAR RHAB WO ECG: CPT | Mod: 95

## 2025-02-25 ENCOUNTER — NON-APPOINTMENT (OUTPATIENT)
Age: 45
End: 2025-02-25

## 2025-02-26 ENCOUNTER — APPOINTMENT (OUTPATIENT)
Dept: CARDIOLOGY | Facility: CLINIC | Age: 45
End: 2025-02-26
Payer: COMMERCIAL

## 2025-02-26 PROCEDURE — 93797 PHYS/QHP OP CAR RHAB WO ECG: CPT | Mod: 95

## 2025-02-27 ENCOUNTER — APPOINTMENT (OUTPATIENT)
Dept: CARDIOLOGY | Facility: CLINIC | Age: 45
End: 2025-02-27
Payer: COMMERCIAL

## 2025-02-27 PROCEDURE — 93797 PHYS/QHP OP CAR RHAB WO ECG: CPT | Mod: 95

## 2025-03-03 ENCOUNTER — APPOINTMENT (OUTPATIENT)
Dept: CARDIOLOGY | Facility: CLINIC | Age: 45
End: 2025-03-03
Payer: COMMERCIAL

## 2025-03-03 PROCEDURE — 93797 PHYS/QHP OP CAR RHAB WO ECG: CPT | Mod: 95

## 2025-03-04 ENCOUNTER — APPOINTMENT (OUTPATIENT)
Dept: CARDIOLOGY | Facility: CLINIC | Age: 45
End: 2025-03-04
Payer: COMMERCIAL

## 2025-03-04 VITALS
OXYGEN SATURATION: 100 % | WEIGHT: 142 LBS | HEART RATE: 69 BPM | SYSTOLIC BLOOD PRESSURE: 90 MMHG | DIASTOLIC BLOOD PRESSURE: 66 MMHG | HEIGHT: 68 IN | BODY MASS INDEX: 21.52 KG/M2

## 2025-03-04 DIAGNOSIS — E78.5 HYPERLIPIDEMIA, UNSPECIFIED: ICD-10-CM

## 2025-03-04 DIAGNOSIS — Z95.1 PRESENCE OF AORTOCORONARY BYPASS GRAFT: ICD-10-CM

## 2025-03-04 DIAGNOSIS — E10.59 TYPE 1 DIABETES MELLITUS WITH OTHER CIRCULATORY COMPLICATIONS: ICD-10-CM

## 2025-03-04 DIAGNOSIS — I10 ESSENTIAL (PRIMARY) HYPERTENSION: ICD-10-CM

## 2025-03-04 PROCEDURE — 93000 ELECTROCARDIOGRAM COMPLETE: CPT

## 2025-03-04 PROCEDURE — G2211 COMPLEX E/M VISIT ADD ON: CPT

## 2025-03-04 PROCEDURE — 99214 OFFICE O/P EST MOD 30 MIN: CPT

## 2025-03-05 ENCOUNTER — APPOINTMENT (OUTPATIENT)
Dept: CARDIOLOGY | Facility: CLINIC | Age: 45
End: 2025-03-05
Payer: COMMERCIAL

## 2025-03-05 PROCEDURE — 93797 PHYS/QHP OP CAR RHAB WO ECG: CPT | Mod: 95

## 2025-03-06 ENCOUNTER — APPOINTMENT (OUTPATIENT)
Dept: CARDIOLOGY | Facility: CLINIC | Age: 45
End: 2025-03-06

## 2025-03-06 PROCEDURE — 93797 PHYS/QHP OP CAR RHAB WO ECG: CPT | Mod: 95

## 2025-03-10 ENCOUNTER — APPOINTMENT (OUTPATIENT)
Dept: CARDIOLOGY | Facility: CLINIC | Age: 45
End: 2025-03-10
Payer: COMMERCIAL

## 2025-03-10 PROCEDURE — 93797 PHYS/QHP OP CAR RHAB WO ECG: CPT | Mod: 95

## 2025-03-12 ENCOUNTER — APPOINTMENT (OUTPATIENT)
Dept: CARDIOLOGY | Facility: CLINIC | Age: 45
End: 2025-03-12
Payer: COMMERCIAL

## 2025-03-12 PROCEDURE — 93797 PHYS/QHP OP CAR RHAB WO ECG: CPT | Mod: 95

## 2025-03-13 ENCOUNTER — APPOINTMENT (OUTPATIENT)
Dept: CARDIOLOGY | Facility: CLINIC | Age: 45
End: 2025-03-13
Payer: COMMERCIAL

## 2025-03-13 PROCEDURE — 93797 PHYS/QHP OP CAR RHAB WO ECG: CPT | Mod: 95

## 2025-03-17 ENCOUNTER — APPOINTMENT (OUTPATIENT)
Dept: CARDIOLOGY | Facility: CLINIC | Age: 45
End: 2025-03-17
Payer: COMMERCIAL

## 2025-03-17 PROCEDURE — 93797 PHYS/QHP OP CAR RHAB WO ECG: CPT | Mod: 95

## 2025-03-19 ENCOUNTER — APPOINTMENT (OUTPATIENT)
Dept: CARDIOLOGY | Facility: CLINIC | Age: 45
End: 2025-03-19
Payer: COMMERCIAL

## 2025-03-19 PROCEDURE — 93797 PHYS/QHP OP CAR RHAB WO ECG: CPT | Mod: 95

## 2025-03-20 ENCOUNTER — APPOINTMENT (OUTPATIENT)
Dept: CARDIOLOGY | Facility: CLINIC | Age: 45
End: 2025-03-20
Payer: COMMERCIAL

## 2025-03-20 PROCEDURE — 93797 PHYS/QHP OP CAR RHAB WO ECG: CPT | Mod: 95

## 2025-03-24 ENCOUNTER — APPOINTMENT (OUTPATIENT)
Dept: CARDIOLOGY | Facility: CLINIC | Age: 45
End: 2025-03-24
Payer: COMMERCIAL

## 2025-03-24 PROCEDURE — 93797 PHYS/QHP OP CAR RHAB WO ECG: CPT | Mod: 95

## 2025-03-25 ENCOUNTER — APPOINTMENT (OUTPATIENT)
Dept: CARDIOLOGY | Facility: CLINIC | Age: 45
End: 2025-03-25
Payer: COMMERCIAL

## 2025-03-25 PROCEDURE — 93798 PHYS/QHP OP CAR RHAB W/ECG: CPT

## 2025-07-15 ENCOUNTER — APPOINTMENT (OUTPATIENT)
Dept: CARDIOLOGY | Facility: CLINIC | Age: 45
End: 2025-07-15
Payer: COMMERCIAL

## 2025-07-15 VITALS
TEMPERATURE: 98 F | HEART RATE: 71 BPM | OXYGEN SATURATION: 98 % | HEIGHT: 68 IN | DIASTOLIC BLOOD PRESSURE: 52 MMHG | WEIGHT: 141 LBS | SYSTOLIC BLOOD PRESSURE: 90 MMHG | RESPIRATION RATE: 15 BRPM | BODY MASS INDEX: 21.37 KG/M2

## 2025-07-15 VITALS
OXYGEN SATURATION: 98 % | HEIGHT: 68 IN | WEIGHT: 141 LBS | SYSTOLIC BLOOD PRESSURE: 90 MMHG | BODY MASS INDEX: 21.37 KG/M2 | HEART RATE: 71 BPM | DIASTOLIC BLOOD PRESSURE: 52 MMHG

## 2025-07-15 PROCEDURE — 93000 ELECTROCARDIOGRAM COMPLETE: CPT

## 2025-07-15 PROCEDURE — 99214 OFFICE O/P EST MOD 30 MIN: CPT

## 2025-07-15 PROCEDURE — G2211 COMPLEX E/M VISIT ADD ON: CPT | Mod: NC

## 2025-07-18 LAB
ALBUMIN SERPL ELPH-MCNC: 4.2 G/DL
ALP BLD-CCNC: 64 U/L
ALT SERPL-CCNC: 47 U/L
ANION GAP SERPL CALC-SCNC: 11 MMOL/L
AST SERPL-CCNC: 27 U/L
BASOPHILS # BLD AUTO: 0.03 K/UL
BASOPHILS NFR BLD AUTO: 0.6 %
BILIRUB SERPL-MCNC: 1 MG/DL
BUN SERPL-MCNC: 24 MG/DL
CALCIUM SERPL-MCNC: 9.6 MG/DL
CHLORIDE SERPL-SCNC: 103 MMOL/L
CHOLEST SERPL-MCNC: 101 MG/DL
CO2 SERPL-SCNC: 27 MMOL/L
CREAT SERPL-MCNC: 1.1 MG/DL
EGFRCR SERPLBLD CKD-EPI 2021: 84 ML/MIN/1.73M2
EOSINOPHIL # BLD AUTO: 0.03 K/UL
EOSINOPHIL NFR BLD AUTO: 0.6 %
ESTIMATED AVERAGE GLUCOSE: 163 MG/DL
GLUCOSE SERPL-MCNC: 182 MG/DL
HBA1C MFR BLD HPLC: 7.3 %
HCT VFR BLD CALC: 46.7 %
HDLC SERPL-MCNC: 44 MG/DL
HGB BLD-MCNC: 15 G/DL
IMM GRANULOCYTES NFR BLD AUTO: 0.2 %
LDLC SERPL-MCNC: 44 MG/DL
LYMPHOCYTES # BLD AUTO: 1.68 K/UL
LYMPHOCYTES NFR BLD AUTO: 32.5 %
MAN DIFF?: NORMAL
MCHC RBC-ENTMCNC: 30.8 PG
MCHC RBC-ENTMCNC: 32.1 G/DL
MCV RBC AUTO: 95.9 FL
MONOCYTES # BLD AUTO: 0.48 K/UL
MONOCYTES NFR BLD AUTO: 9.3 %
NEUTROPHILS # BLD AUTO: 2.94 K/UL
NEUTROPHILS NFR BLD AUTO: 56.8 %
NONHDLC SERPL-MCNC: 58 MG/DL
PLATELET # BLD AUTO: 173 K/UL
POTASSIUM SERPL-SCNC: 5.1 MMOL/L
PROT SERPL-MCNC: 6.5 G/DL
RBC # BLD: 4.87 M/UL
RBC # FLD: 13.1 %
SODIUM SERPL-SCNC: 141 MMOL/L
TRIGL SERPL-MCNC: 57 MG/DL
TSH SERPL-ACNC: 0.96 UIU/ML
WBC # FLD AUTO: 5.17 K/UL

## (undated) DEVICE — BULLDOG SPRING CLIP LATIS/LATIS 6MM 1/2 FORCE (BLUE)

## (undated) DEVICE — SOL IRR POUR NS 0.9% 500ML

## (undated) DEVICE — DRAPE SLUSH / WARMER 44 X 66"

## (undated) DEVICE — TUBING TRUWAVE PRESSURE MALE/FEMALE 72"

## (undated) DEVICE — TUBING IV SET MICROCLAVE ADAPTER

## (undated) DEVICE — SOL NORMOSOL-R PH7.4 1000ML

## (undated) DEVICE — ELCTR BOVIE TIP BLADE MEGADYNE E-Z CLEAN 2.5" (SHORT)

## (undated) DEVICE — ELCTR REM POLYHESIVE ADULT PT RETURN 15FT

## (undated) DEVICE — SUT ETHIBOND 2-0 36" SH

## (undated) DEVICE — GLV 7.5 PROTEXIS (WHITE)

## (undated) DEVICE — DRSG CURITY GAUZE SPONGE 4 X 4" 12-PLY

## (undated) DEVICE — SUCTION YANKAUER NO CONTROL VENT

## (undated) DEVICE — SUT PROLENE 6-0 30" C-1

## (undated) DEVICE — SET PERF Y TYPE 13.5IN STRL

## (undated) DEVICE — WARMING BLANKET FULL UNDERBODY

## (undated) DEVICE — PACING CABLE (BLUE) ATRIAL TEMP SCREW DOWN 12FT

## (undated) DEVICE — SPECIMEN CONTAINER 100ML

## (undated) DEVICE — STOPCOCK 4-WAY (BLUE) DISCOFIX SPIN-LOCK CONNECTOR

## (undated) DEVICE — DRSG DERMABOND 0.7ML

## (undated) DEVICE — DOPPLER PROBE 20MHZ DISP

## (undated) DEVICE — VESSEL LOOP MAXI-BLUE 0.120" X 16"

## (undated) DEVICE — DRAPE MAYO STAND 30"

## (undated) DEVICE — FILTER REINFUSION FOR SALVAGED BLOOD DISP

## (undated) DEVICE — GOWN TRIMAX XXL

## (undated) DEVICE — SUT PLEDGET PRE PUNCH 4.8 X 9.5 X 1.5 MM

## (undated) DEVICE — POSITIONER FOAM EGG CRATE ULNAR 2PCS (PINK)

## (undated) DEVICE — SUT BIOSYN 4-0 18" P-12

## (undated) DEVICE — SUT STAINLESS STEEL 5 18" SCC

## (undated) DEVICE — Device

## (undated) DEVICE — SUT SILK 5-0 18" TF

## (undated) DEVICE — CATH IV SAFE INSYTE 24G X 3/4" (YELLOW)

## (undated) DEVICE — DRAPE TOWEL BLUE 17" X 24"

## (undated) DEVICE — SYR LUER LOK 20CC

## (undated) DEVICE — SUT PROLENE 7-0 24" BV175-8 MULTIPASS

## (undated) DEVICE — PACK CARDIAC YELLOW

## (undated) DEVICE — SUT PLEDGET SOFT LARGE 3/8" X 3/16" X 1/16" X6

## (undated) DEVICE — TUBING SUCTION 20FT

## (undated) DEVICE — TOURNIQUET SET SURE-SNARE 22FR (2 TUBES, 2 UMBILICAL TAPES, 2 PLASTIC SNARES) 5"

## (undated) DEVICE — TUBING SUCTION NON CONDUCTIVE 316X18" STERILE

## (undated) DEVICE — MEDTRONIC CLEARVIEW BLOWER MISTER KIT W TUBING SET

## (undated) DEVICE — SYR LUER LOK 30CC

## (undated) DEVICE — PAD NERVE PHRENIC NERVE

## (undated) DEVICE — SUT SILK 2-0 18" SH (POP-OFF)

## (undated) DEVICE — SUT SOFSILK 0 18" TIES

## (undated) DEVICE — NDL HYPO SAFE 18G X 1.5" (PINK)

## (undated) DEVICE — SOL IRR BAG NS 0.9% 3000ML

## (undated) DEVICE — PREP DURAPREP 26CC

## (undated) DEVICE — SYR LUER LOK 1CC

## (undated) DEVICE — DRSG OPSITE 13.75 X 4"

## (undated) DEVICE — VESSEL LOOP MAXI-RED  0.120" X 16"

## (undated) DEVICE — AORTIC PUNCH 4.0MM STANDARD HANDLE

## (undated) DEVICE — ADAPTOR DLP "Y" FEMALE ON SINGLE LEG 3.5" X 10"

## (undated) DEVICE — CHEST DRAIN PLEUR-EVAC DRY/WET ADULT-PEDS SINGLE (QUICK)

## (undated) DEVICE — DRSG DERMABOND PRINEO 60CM

## (undated) DEVICE — SUT PROLENE 5-0 36" RB-1

## (undated) DEVICE — CATH IV SAFE INSYTE 18G X 1.16" (GREEN)

## (undated) DEVICE — FOLEY TRAY 16FR 5CC LF LUBRISIL ADVANCE TEMP CLOSED

## (undated) DEVICE — SUT PROLENE 4-0 36" RB-1

## (undated) DEVICE — PACK UNIVERSAL CARDIAC

## (undated) DEVICE — SYR LUER LOK 50CC

## (undated) DEVICE — SUT MONOCRYL 4-0 18" PS-2

## (undated) DEVICE — TUBING KIT FAST START ATF 40

## (undated) DEVICE — PACING CABLE (BROWN) A/V TEMP SCREW DOWN 12FT

## (undated) DEVICE — GLV 8 PROTEXIS (WHITE)

## (undated) DEVICE — DRAIN CHANNEL 32FR ROUND HUBLESS FULL FLUTED

## (undated) DEVICE — VASOVIEW HEMOPRO 2

## (undated) DEVICE — BEAVER BLADE MICRO SHARP 15 DEGREE 3MM (BLUE)

## (undated) DEVICE — SUT PROLENE 7-0 24" BV175-6

## (undated) DEVICE — CONNECTOR CARDIAC 1:1 FOR HUBLESS DRAINS

## (undated) DEVICE — DRSG OPSITE 2.5 X 2"

## (undated) DEVICE — SAW BLADE STRYKER STERNUM 31MM X 6.27 X .79

## (undated) DEVICE — SENSOR MYOCARDIAL TEMP 15MM

## (undated) DEVICE — SUT VICRYL 1 36" CTX UNDYED

## (undated) DEVICE — DRAPE IOBAN 33" X 23"

## (undated) DEVICE — ELCTR BOVIE PENCIL HANDPIECE